# Patient Record
Sex: FEMALE | Race: WHITE | NOT HISPANIC OR LATINO | Employment: OTHER | URBAN - METROPOLITAN AREA
[De-identification: names, ages, dates, MRNs, and addresses within clinical notes are randomized per-mention and may not be internally consistent; named-entity substitution may affect disease eponyms.]

---

## 2017-06-22 ENCOUNTER — OFFICE VISIT (OUTPATIENT)
Dept: MEDICAL GROUP | Facility: PHYSICIAN GROUP | Age: 67
End: 2017-06-22
Payer: MEDICARE

## 2017-06-22 VITALS
WEIGHT: 172 LBS | DIASTOLIC BLOOD PRESSURE: 80 MMHG | BODY MASS INDEX: 28.66 KG/M2 | RESPIRATION RATE: 12 BRPM | OXYGEN SATURATION: 95 % | TEMPERATURE: 98.4 F | SYSTOLIC BLOOD PRESSURE: 120 MMHG | HEIGHT: 65 IN | HEART RATE: 82 BPM

## 2017-06-22 DIAGNOSIS — R10.9 RIGHT SIDED ABDOMINAL PAIN: ICD-10-CM

## 2017-06-22 DIAGNOSIS — E78.1 HYPERTRIGLYCERIDEMIA: ICD-10-CM

## 2017-06-22 DIAGNOSIS — M25.561 CHRONIC PAIN OF RIGHT KNEE: ICD-10-CM

## 2017-06-22 DIAGNOSIS — M79.672 PAIN OF LEFT HEEL: ICD-10-CM

## 2017-06-22 DIAGNOSIS — Z12.31 VISIT FOR SCREENING MAMMOGRAM: ICD-10-CM

## 2017-06-22 DIAGNOSIS — E55.9 VITAMIN D INSUFFICIENCY: ICD-10-CM

## 2017-06-22 DIAGNOSIS — G89.29 CHRONIC PAIN OF RIGHT KNEE: ICD-10-CM

## 2017-06-22 DIAGNOSIS — B35.1 OM (ONYCHOMYCOSIS): ICD-10-CM

## 2017-06-22 DIAGNOSIS — R22.1 NECK SWELLING: ICD-10-CM

## 2017-06-22 DIAGNOSIS — Z00.00 ROUTINE HEALTH MAINTENANCE: ICD-10-CM

## 2017-06-22 DIAGNOSIS — L57.0 AK (ACTINIC KERATOSIS): ICD-10-CM

## 2017-06-22 DIAGNOSIS — Z23 NEED FOR VACCINATION: ICD-10-CM

## 2017-06-22 LAB
APPEARANCE UR: CLEAR
BILIRUB UR STRIP-MCNC: NORMAL MG/DL
COLOR UR AUTO: NORMAL
GLUCOSE UR STRIP.AUTO-MCNC: NORMAL MG/DL
KETONES UR STRIP.AUTO-MCNC: NORMAL MG/DL
LEUKOCYTE ESTERASE UR QL STRIP.AUTO: NORMAL
NITRITE UR QL STRIP.AUTO: NORMAL
PH UR STRIP.AUTO: 5 [PH] (ref 5–8)
PROT UR QL STRIP: NORMAL MG/DL
RBC UR QL AUTO: NORMAL
SP GR UR STRIP.AUTO: 1.03
UROBILINOGEN UR STRIP-MCNC: 0.2 MG/DL

## 2017-06-22 PROCEDURE — 81002 URINALYSIS NONAUTO W/O SCOPE: CPT | Performed by: NURSE PRACTITIONER

## 2017-06-22 PROCEDURE — 90670 PCV13 VACCINE IM: CPT | Performed by: NURSE PRACTITIONER

## 2017-06-22 PROCEDURE — G0009 ADMIN PNEUMOCOCCAL VACCINE: HCPCS | Performed by: NURSE PRACTITIONER

## 2017-06-22 PROCEDURE — 99214 OFFICE O/P EST MOD 30 MIN: CPT | Mod: 25 | Performed by: NURSE PRACTITIONER

## 2017-06-22 RX ORDER — OMEPRAZOLE 20 MG/1
20 CAPSULE, DELAYED RELEASE ORAL DAILY
COMMUNITY
End: 2018-12-27 | Stop reason: CLARIF

## 2017-06-22 RX ORDER — TERBINAFINE HYDROCHLORIDE 250 MG/1
250 TABLET ORAL DAILY
Qty: 45 TAB | Refills: 1 | Status: SHIPPED | OUTPATIENT
Start: 2017-06-22 | End: 2018-04-06 | Stop reason: SDUPTHER

## 2017-06-22 RX ORDER — MELOXICAM 15 MG/1
15 TABLET ORAL
Qty: 30 TAB | Refills: 1 | Status: SHIPPED | OUTPATIENT
Start: 2017-06-22 | End: 2017-12-22

## 2017-06-22 RX ORDER — MELOXICAM 15 MG/1
15 TABLET ORAL DAILY
COMMUNITY
End: 2017-06-22 | Stop reason: SDUPTHER

## 2017-06-22 NOTE — Clinical Note
June 22, 2017        Dear Shannan:      I believe you have plantar fascitis:      -frozen water bottle, roll under foot 3 times daily for 10 minutes       -night splint to keep to keep foot flexed      -meloxicam 15 mg daily x3 weeks       -x-ray if symptoms not improving in 3 weeks     For your toenail fungus:       -terbinafine once daily for 45 days       -in 6 weeks do labs, can complete next 45 days if labs okay       -soak feet in epson salt     The meloxicam will hopefully help your right sided pain. If not, please notify me for x-ray.     If you have any questions or concerns, please don't hesitate to call.        Sincerely,        MAYCOL Catalan.    Electronically Signed

## 2017-06-22 NOTE — ASSESSMENT & PLAN NOTE
Long-standing problem, not on any medication for tx.    Ref. Range 10/20/2015 06:16 6/24/2016 06:24   Cholesterol,Tot Latest Ref Range: 100-199 mg/dL 188 183   Triglycerides Latest Ref Range: 0-149 mg/dL 203 (H) 214 (H)   HDL Latest Ref Range: >=40 mg/dL 45 45   LDL Latest Ref Range: <100 mg/dL 102 (H) 95

## 2017-06-22 NOTE — ASSESSMENT & PLAN NOTE
Ongoing problem currently only on vitamin D 3,000 units supplementation.    Ref. Range 9/13/2014 09:08 6/24/2016 06:24   25-Hydroxy   Vitamin D 25 Latest Ref Range:  ng/mL 24 (L) 23 (L)

## 2017-06-22 NOTE — MR AVS SNAPSHOT
"        Shannan Dove   2017 3:00 PM   Office Visit   MRN: [id number]    Department:  [facility]   Dept Phone:  [phone]    Description:  Female : 1950   Provider:  ELLEN Catalan           Reason for Visit     Annual Exam           Allergies as of 2017     Allergen Noted Reactions    Pcn [Penicillins] 2011   Hives      You were diagnosed with     Well female exam with routine gynecological exam   [856115]       Pain of left heel   [239393]       Need for vaccination   [785061]       Vitamin D insufficiency   [487122]       Hypertriglyceridemia   [109921]       Chronic pain of right knee   [8170818]       Right sided abdominal pain   [534055]       AK (actinic keratosis)   [633894]       OM (onychomycosis)   [831257]       Neck swelling   [386544]         Vital Signs     Blood Pressure Pulse Temperature Respirations Height Weight    120/80 mmHg 82 36.9 °C (98.4 °F) 12 1.651 m (5' 5\") 78.019 kg (172 lb)    Body Mass Index Oxygen Saturation Smoking Status             28.62 kg/m2 95% Never Smoker          Basic Information     Date Of Birth Sex Race Ethnicity Preferred Language    1950 Female Unknown Non- English      Problem List              ICD-10-CM Priority Class Noted - Resolved    Heart murmur R01.1   Unknown - Present    Post menopausal problems N95.9   Unknown - Present    AK (actinic keratosis) L57.0   2016 - Present    Chronic pain of right knee M25.561, G89.29   2016 - Present    Hypertriglyceridemia E78.1   2016 - Present    Right-sided thoracic back pain M54.6   2016 - Present    Pain of left heel M79.672   2017 - Present    Vitamin D insufficiency E55.9   2017 - Present    Right sided abdominal pain R10.9   2017 - Present    OM (onychomycosis) B35.1   2017 - Present      Health Maintenance        Date Due Completion Dates    IMM DTaP/Tdap/Td Vaccine (1 - Tdap) 12/3/1970 ---    IMM PNEUMOCOCCAL 65+ (ADULT) " LOW/MEDIUM RISK SERIES (1 of 2 - PCV13) 12/3/2016 ---    MAMMOGRAM 7/7/2017 7/7/2016, 9/28/2011, 6/17/2009, 6/17/2009, 6/16/2008, 6/16/2008    BONE DENSITY 7/7/2021 7/7/2016, 9/28/2011, 6/17/2009, 6/16/2008    COLONOSCOPY 8/3/2021 8/3/2016            Current Immunizations     13-VALENT PCV PREVNAR  Incomplete    Influenza TIV (IM) 10/21/2011    Influenza Vaccine Quad Inj (Pf) 9/13/2014    SHINGLES VACCINE 10/21/2011    Tdap Vaccine 8/11/2010      Below and/or attached are the medications your provider expects you to take. Review all of your home medications and newly ordered medications with your provider and/or pharmacist. Follow medication instructions as directed by your provider and/or pharmacist. Please keep your medication list with you and share with your provider. Update the information when medications are discontinued, doses are changed, or new medications (including over-the-counter products) are added; and carry medication information at all times in the event of emergency situations     Allergies:  PCN - Hives               Medications  Valid as of: June 22, 2017 -  4:40 PM    Generic Name Brand Name Tablet Size Instructions for use    Cholecalciferol (Tab) Vitamin D3 2000 UNITS Take  by mouth.        Meloxicam (Tab) MOBIC 15 MG Take 1 Tab by mouth 1 time daily as needed for Mild Pain.        Omeprazole (CAPSULE DELAYED RELEASE) PRILOSEC 20 MG Take 20 mg by mouth every day.        Terbinafine HCl (Tab) LAMISIL 250 MG Take 1 Tab by mouth every day.        .                 Medicines prescribed today were sent to:     [facility] #[id number] - [place], [place] - [address]    [address] [facility] [place] [address]    Phone: [phone] Fax: [phone]    Open 24 Hours?: No      Medication refill instructions:       If your prescription bottle indicates you have medication refills left, it is not necessary to call your provider’s office. Please contact your pharmacy and they will refill your medication.    If your  prescription bottle indicates you do not have any refills left, you may request refills at any time through one of the following ways: The online [email or web address] system (except Urgent Care), by calling your provider’s office, or by asking your pharmacy to contact your provider’s office with a refill request. Medication refills are processed only during regular business hours and may not be available until the next business day. Your provider may request additional information or to have a follow-up visit with you prior to refilling your medication.   *Please Note: Medication refills are assigned a new Rx number when refilled electronically. Your pharmacy may indicate that no refills were authorized even though a new prescription for the same medication is available at the pharmacy. Please request the medicine by name with the pharmacy before contacting your provider for a refill.        Your To Do List     Future Labs/Procedures Complete By Expires    CBC WITH DIFFERENTIAL  As directed 6/23/2018    COMP METABOLIC PANEL  As directed 6/23/2018    DX-FOOT-COMPLETE 3+ LEFT  As directed 12/23/2017    LIPID PROFILE  As directed 6/23/2018    TSH  As directed 6/23/2018    US-SOFT TISSUES OF HEAD - NECK  As directed 12/23/2017    VITAMIN D,25 HYDROXY  As directed 6/23/2018      Referral     A referral request has been sent to our patient care coordination department. Please allow 3-5 business days for us to process this request and contact you either by phone or mail. If you do not hear from us by the 5th business day, please call us at [phone].           [email or web address] Access Code: Activation code not generated  Current [email or web address] Status: Active

## 2017-06-22 NOTE — ASSESSMENT & PLAN NOTE
Ongoing problem for >1 year. States she has pain just below right breast between ribs. It hurts when she leans forward or when she presses on it.

## 2017-06-22 NOTE — ASSESSMENT & PLAN NOTE
Ongoing problem. She has persistent spots on face that dermatologist said do not need to be frozen off. She also has spots on her back she would like dermatologist to look at.

## 2017-06-22 NOTE — ASSESSMENT & PLAN NOTE
She has persistent toenail fungus of both great toes and in the past was on oral anti-fungal but did not complete course.

## 2017-06-23 ENCOUNTER — HOSPITAL ENCOUNTER (OUTPATIENT)
Dept: RADIOLOGY | Facility: MEDICAL CENTER | Age: 67
End: 2017-06-23
Attending: NURSE PRACTITIONER
Payer: MEDICARE

## 2017-06-23 DIAGNOSIS — R22.1 NECK SWELLING: ICD-10-CM

## 2017-06-23 PROBLEM — E07.9 THYROID MASS: Status: ACTIVE | Noted: 2017-06-23

## 2017-06-23 PROCEDURE — 76536 US EXAM OF HEAD AND NECK: CPT

## 2017-06-28 NOTE — PROGRESS NOTES
Subjective:     Chief Complaint   Patient presents with   • Annual Exam       HPI  Shannan Dove is a 66 y.o. female here today for annual exam, however, she has multiple issues she would like to address today, therefore I have advised patient that this will likely be charged for a regular visit rather than annual exam.    Pain of left heel  New problem. Started a few months ago. The pain is in left heel, described as aching. Hurts the most when she wakes up in the morning. She will do calf stretches which helps a little bit.     Vitamin D insufficiency  Ongoing problem currently only on vitamin D 3,000 units supplementation.    Ref. Range 9/13/2014 09:08 6/24/2016 06:24   25-Hydroxy   Vitamin D 25 Latest Ref Range:  ng/mL 24 (L) 23 (L)       Hypertriglyceridemia  Long-standing problem, not on any medication for tx.    Ref. Range 10/20/2015 06:16 6/24/2016 06:24   Cholesterol,Tot Latest Ref Range: 100-199 mg/dL 188 183   Triglycerides Latest Ref Range: 0-149 mg/dL 203 (H) 214 (H)   HDL Latest Ref Range: >=40 mg/dL 45 45   LDL Latest Ref Range: <100 mg/dL 102 (H) 95       Chronic pain of right knee  Established problem since June 2016 after she had right knee strain while gardening. Meloxicam works well for her when pain more severe. No erythema, edema, or warmth of joint. No instability.     Right sided abdominal pain  Ongoing problem for >1 year. States she has pain just below right breast between ribs. It hurts when she leans forward or when she presses on it.     AK (actinic keratosis)  Ongoing problem. She has persistent spots on face that dermatologist said do not need to be frozen off. She also has spots on her back she would like dermatologist to look at.     OM (onychomycosis)  She has persistent toenail fungus of both great toes and in the past was on oral anti-fungal but did not complete course.        Diagnoses of Pain of left heel, Vitamin D insufficiency, Hypertriglyceridemia, Chronic pain of  "right knee, Right sided abdominal pain, AK (actinic keratosis), OM (onychomycosis), Neck swelling, Need for vaccination, Visit for screening mammogram, and Routine health maintenance were pertinent to this visit.    Allergies: Pcn  Current medicines (including changes today)  Current Outpatient Prescriptions   Medication Sig Dispense Refill   • omeprazole (PRILOSEC) 20 MG delayed-release capsule Take 20 mg by mouth every day.     • meloxicam (MOBIC) 15 MG tablet Take 1 Tab by mouth 1 time daily as needed for Mild Pain. 30 Tab 1   • terbinafine (LAMISIL) 250 MG Tab Take 1 Tab by mouth every day. 45 Tab 1   • Cholecalciferol (VITAMIN D3) 2000 UNITS Tab Take  by mouth.       No current facility-administered medications for this visit.       She  has a past medical history of Heart murmur; Post menopausal problems; Vitamin D deficiency; Hyperlipidemia; Pap smear; and Gyn exam. She also has no past medical history of ASTHMA or Diabetes.    Health Maintenance: mammogram due July ROS  As stated in HPI and additionally  General: No F/C or malaise  CV: No chest pain, NOVAK, or LE edema  Pulm: No sob or dyspnea  GI: No abd pain, N/V, diarrhea, constipation, blood in stool  Endo: No temperature intolerance, skin or hair changes      Objective:     Blood pressure 120/80, pulse 82, temperature 36.9 °C (98.4 °F), resp. rate 12, height 1.651 m (5' 5\"), weight 78.019 kg (172 lb), SpO2 95 %. Body mass index is 28.62 kg/(m^2).  Physical Exam:  General: Alert, oriented, in no acute distress.  Eye contact is good, speech goal directed, affect calm  HEENT: conjunctiva non-injected, sclera non-icteric, EOMs intact.   Pinna normal without lesions. TMs pearly gray bilat. Gross hearing intact.  Nasal turbinates without edema or drainage.   Oral mucous membranes pink and moist with no lesions. Oropharynx without erythema, or exudate.   Neck: No adenopathy or masses in the cervical or supraclavicular regions. No thyromegaly, but palpable " non-tender mass just below right thyroid lobe  Lungs: unlabored. clear to auscultation bilaterally with good excursion.  CV: regular rate and rhythm. No murmurs. No carotid bruits.   Abdomen: Soft, ND, non-tender. No hepatosplenomegaly. Bowel sounds active.  MSK: +TTP in intercostal muscles right low rib region  Left foot without deformity, erythema, edema, or ecchymosis. TTP over insertion of plantar fascia into calcaneous    Ext: no edema, normal color and temperature.   Skin: flat white lesions of face, 2 small flat erythematous lesions of right chin. Toes with thickened, yellow nails   Gait steady.     Assessment and Plan:   Assessment/Plan:  1. Plantar fascitis, left   Enc night splint, frozen water bottle to ice under food, supportive shoes, and meloxicam. Obtain x-ray in 3 weeks if not improving.  - DX-FOOT-COMPLETE 3+ LEFT; Future    2. Vitamin D insufficiency  Not controlled based on last labs, order now for f/u monitoring   - VITAMIN D,25 HYDROXY; Future    3. Hypertriglyceridemia  Stable without tx, but due for monitoring of labs for annual check   - LIPID PROFILE; Future    4. Chronic pain of right knee  Chronic problem, requesting meloxicam for PRN use. Educated on cardiac risks  - meloxicam (MOBIC) 15 MG tablet; Take 1 Tab by mouth 1 time daily as needed for Mild Pain.  Dispense: 30 Tab; Refill: 1    5. Right sided abdominal pain  Appears to be pain of intercostal muscle region. Meloxicam for knee will likely help this pain.   - POCT Urinalysis    6. AK (actinic keratosis)  - REFERRAL TO DERMATOLOGY    7. OM (onychomycosis)  Start terbinafine, obtain labs in 6 weeks to monitor LFT, then may continue another 6 weeks to a max 12 weeks combined  - terbinafine (LAMISIL) 250 MG Tab; Take 1 Tab by mouth every day.  Dispense: 45 Tab; Refill: 1    8. Neck swelling  - TSH; Future  - US-SOFT TISSUES OF HEAD - NECK; Future    9. Need for vaccination  I have placed the below orders and discussed them with an  approved delegating provider. The MA is performing the below orders under the direction of Dr. Ochoa   - PNEUMOCOCCAL CONJUGATE VACCINE 13-VALENT    10. Routine health maintenance  - CBC WITH DIFFERENTIAL; Future  - COMP METABOLIC PANEL; Future  - TSH; Future       Follow up:  Return in about 6 months (around 12/22/2017).    Educated in proper administration of medication(s) ordered today including safety, possible SE, risks, benefits, rationale and alternatives to therapy.   Supportive care, differential diagnoses, and indications for immediate follow-up discussed with patient.    Pathogenesis of diagnosis discussed including typical length and natural progression.    Instructed to return to clinic or nearest emergency department for any change in condition, further concerns, or worsening of symptoms.  Patient states understanding of the plan of care and discharge instructions.      Please note that this dictation was created using voice recognition software. I have made every reasonable attempt to correct obvious errors, but I expect that there are errors of grammar and possibly content that I did not discover before finalizing the note.    Followup: Return in about 6 months (around 12/22/2017). sooner should new symptoms or problems arise.

## 2017-07-11 ENCOUNTER — HOSPITAL ENCOUNTER (OUTPATIENT)
Dept: RADIOLOGY | Facility: MEDICAL CENTER | Age: 67
End: 2017-07-11
Attending: NURSE PRACTITIONER
Payer: MEDICARE

## 2017-07-11 DIAGNOSIS — Z12.31 ENCOUNTER FOR SCREENING MAMMOGRAM FOR BREAST CANCER: ICD-10-CM

## 2017-07-11 PROCEDURE — 77063 BREAST TOMOSYNTHESIS BI: CPT

## 2017-07-13 ENCOUNTER — PATIENT MESSAGE (OUTPATIENT)
Dept: MEDICAL GROUP | Facility: PHYSICIAN GROUP | Age: 67
End: 2017-07-13

## 2017-07-13 DIAGNOSIS — R92.30 DENSE BREAST TISSUE ON MAMMOGRAM: ICD-10-CM

## 2017-08-04 ENCOUNTER — HOSPITAL ENCOUNTER (OUTPATIENT)
Dept: LAB | Facility: MEDICAL CENTER | Age: 67
End: 2017-08-04
Attending: NURSE PRACTITIONER
Payer: MEDICARE

## 2017-08-04 DIAGNOSIS — R22.1 NECK SWELLING: ICD-10-CM

## 2017-08-04 DIAGNOSIS — E55.9 VITAMIN D INSUFFICIENCY: ICD-10-CM

## 2017-08-04 DIAGNOSIS — Z00.00 ROUTINE HEALTH MAINTENANCE: ICD-10-CM

## 2017-08-04 DIAGNOSIS — E78.1 HYPERTRIGLYCERIDEMIA: ICD-10-CM

## 2017-08-04 LAB
25(OH)D3 SERPL-MCNC: 25 NG/ML (ref 30–100)
ALBUMIN SERPL BCP-MCNC: 4.4 G/DL (ref 3.2–4.9)
ALBUMIN/GLOB SERPL: 1.6 G/DL
ALP SERPL-CCNC: 48 U/L (ref 30–99)
ALT SERPL-CCNC: 28 U/L (ref 2–50)
ANION GAP SERPL CALC-SCNC: 7 MMOL/L (ref 0–11.9)
AST SERPL-CCNC: 17 U/L (ref 12–45)
BASOPHILS # BLD AUTO: 0.4 % (ref 0–1.8)
BASOPHILS # BLD: 0.02 K/UL (ref 0–0.12)
BILIRUB SERPL-MCNC: 0.6 MG/DL (ref 0.1–1.5)
BUN SERPL-MCNC: 16 MG/DL (ref 8–22)
CALCIUM SERPL-MCNC: 9.6 MG/DL (ref 8.5–10.5)
CHLORIDE SERPL-SCNC: 105 MMOL/L (ref 96–112)
CHOLEST SERPL-MCNC: 242 MG/DL (ref 100–199)
CO2 SERPL-SCNC: 27 MMOL/L (ref 20–33)
CREAT SERPL-MCNC: 0.69 MG/DL (ref 0.5–1.4)
EOSINOPHIL # BLD AUTO: 0.17 K/UL (ref 0–0.51)
EOSINOPHIL NFR BLD: 3.3 % (ref 0–6.9)
ERYTHROCYTE [DISTWIDTH] IN BLOOD BY AUTOMATED COUNT: 42 FL (ref 35.9–50)
GFR SERPL CREATININE-BSD FRML MDRD: >60 ML/MIN/1.73 M 2
GLOBULIN SER CALC-MCNC: 2.8 G/DL (ref 1.9–3.5)
GLUCOSE SERPL-MCNC: 104 MG/DL (ref 65–99)
HCT VFR BLD AUTO: 41.3 % (ref 37–47)
HDLC SERPL-MCNC: 46 MG/DL
HGB BLD-MCNC: 13.8 G/DL (ref 12–16)
IMM GRANULOCYTES # BLD AUTO: 0.01 K/UL (ref 0–0.11)
IMM GRANULOCYTES NFR BLD AUTO: 0.2 % (ref 0–0.9)
LDLC SERPL CALC-MCNC: 148 MG/DL
LYMPHOCYTES # BLD AUTO: 2.21 K/UL (ref 1–4.8)
LYMPHOCYTES NFR BLD: 43.4 % (ref 22–41)
MCH RBC QN AUTO: 30.3 PG (ref 27–33)
MCHC RBC AUTO-ENTMCNC: 33.4 G/DL (ref 33.6–35)
MCV RBC AUTO: 90.8 FL (ref 81.4–97.8)
MONOCYTES # BLD AUTO: 0.45 K/UL (ref 0–0.85)
MONOCYTES NFR BLD AUTO: 8.8 % (ref 0–13.4)
NEUTROPHILS # BLD AUTO: 2.23 K/UL (ref 2–7.15)
NEUTROPHILS NFR BLD: 43.9 % (ref 44–72)
NRBC # BLD AUTO: 0 K/UL
NRBC BLD AUTO-RTO: 0 /100 WBC
PLATELET # BLD AUTO: 272 K/UL (ref 164–446)
PMV BLD AUTO: 10.3 FL (ref 9–12.9)
POTASSIUM SERPL-SCNC: 3.9 MMOL/L (ref 3.6–5.5)
PROT SERPL-MCNC: 7.2 G/DL (ref 6–8.2)
RBC # BLD AUTO: 4.55 M/UL (ref 4.2–5.4)
SODIUM SERPL-SCNC: 139 MMOL/L (ref 135–145)
TRIGL SERPL-MCNC: 238 MG/DL (ref 0–149)
TSH SERPL DL<=0.005 MIU/L-ACNC: 2.29 UIU/ML (ref 0.3–3.7)
WBC # BLD AUTO: 5.1 K/UL (ref 4.8–10.8)

## 2017-08-04 PROCEDURE — 80061 LIPID PANEL: CPT

## 2017-08-04 PROCEDURE — 85025 COMPLETE CBC W/AUTO DIFF WBC: CPT

## 2017-08-04 PROCEDURE — 80053 COMPREHEN METABOLIC PANEL: CPT

## 2017-08-04 PROCEDURE — 36415 COLL VENOUS BLD VENIPUNCTURE: CPT

## 2017-08-04 PROCEDURE — 82306 VITAMIN D 25 HYDROXY: CPT

## 2017-08-04 PROCEDURE — 84443 ASSAY THYROID STIM HORMONE: CPT

## 2017-08-21 PROBLEM — D49.2 NEOPLASM OF UNSPECIFIED BEHAVIOR OF BONE, SOFT TISSUE, AND SKIN: Status: RESOLVED | Noted: 2017-08-07 | Resolved: 2017-08-21

## 2017-09-07 ENCOUNTER — TELEPHONE (OUTPATIENT)
Dept: RADIOLOGY | Facility: MEDICAL CENTER | Age: 67
End: 2017-09-07

## 2017-09-07 NOTE — TELEPHONE ENCOUNTER
Spoke w/ pt to yahir apt @ [facility] on 9/8 @ 8:00 check in @ 7:45, reviewed prep and location

## 2017-09-08 ENCOUNTER — HOSPITAL ENCOUNTER (OUTPATIENT)
Dept: RADIOLOGY | Facility: MEDICAL CENTER | Age: 67
End: 2017-09-08
Attending: NURSE PRACTITIONER
Payer: MEDICARE

## 2017-09-08 DIAGNOSIS — R92.30 DENSE BREAST TISSUE ON MAMMOGRAM: ICD-10-CM

## 2017-10-26 ENCOUNTER — TELEPHONE (OUTPATIENT)
Dept: RADIOLOGY | Facility: MEDICAL CENTER | Age: 67
End: 2017-10-26

## 2017-10-26 NOTE — TELEPHONE ENCOUNTER
Spoke w/ pt to conf apt @ [facility] on 10/30 @ 7:30 check in @ 7:15, reviewed prep and location

## 2017-10-30 ENCOUNTER — HOSPITAL ENCOUNTER (OUTPATIENT)
Dept: RADIOLOGY | Facility: MEDICAL CENTER | Age: 67
End: 2017-10-30
Attending: NURSE PRACTITIONER
Payer: MEDICARE

## 2017-10-30 PROCEDURE — 76641 ULTRASOUND BREAST COMPLETE: CPT

## 2017-11-17 ENCOUNTER — PATIENT OUTREACH (OUTPATIENT)
Dept: HEALTH INFORMATION MANAGEMENT | Facility: OTHER | Age: 67
End: 2017-11-17

## 2017-11-17 NOTE — PROGRESS NOTES
Attempt #:1    WebIZ Checked & Epic Updated: Epic matches WebIZ  · WebIZ Recommendations: Patient is up to date on all vaccines  · Is patient due for Tdap? NO  · Is patient due for Shingles? NO  HealthConnect Verified: yes  Verify PCP: yes    Communication Preference Obtained: yes     Review Care Team: yes    Annual Wellness Visit Scheduling  1. Scheduling Status:Scheduled     Care Gap Scheduling (Attempt to Schedule EACH Overdue Care Gap!)     Health Maintenance Due   Topic Date Due   • Annual Wellness Visit  1950   • IMM INFLUENZA (1) 09/01/2017        Scheduled patient for Annual Wellness Visit       Med[employer] Activation: already active  Medminder Melisa: no  Virtual Visits: no  Opt In to Text Messages: no

## 2017-12-22 ENCOUNTER — HOSPITAL ENCOUNTER (OUTPATIENT)
Dept: LAB | Facility: MEDICAL CENTER | Age: 67
End: 2017-12-22
Attending: FAMILY MEDICINE
Payer: MEDICARE

## 2017-12-22 ENCOUNTER — OFFICE VISIT (OUTPATIENT)
Dept: MEDICAL GROUP | Facility: PHYSICIAN GROUP | Age: 67
End: 2017-12-22
Payer: MEDICARE

## 2017-12-22 VITALS
OXYGEN SATURATION: 94 % | HEART RATE: 91 BPM | BODY MASS INDEX: 29.16 KG/M2 | DIASTOLIC BLOOD PRESSURE: 84 MMHG | TEMPERATURE: 98.4 F | HEIGHT: 65 IN | SYSTOLIC BLOOD PRESSURE: 136 MMHG | WEIGHT: 175 LBS

## 2017-12-22 DIAGNOSIS — K21.9 GASTROESOPHAGEAL REFLUX DISEASE WITHOUT ESOPHAGITIS: ICD-10-CM

## 2017-12-22 DIAGNOSIS — B35.1 OM (ONYCHOMYCOSIS): ICD-10-CM

## 2017-12-22 DIAGNOSIS — E07.9 THYROID MASS: ICD-10-CM

## 2017-12-22 LAB
T3 SERPL-MCNC: 99.3 NG/DL (ref 60–181)
T4 FREE SERPL-MCNC: 0.84 NG/DL (ref 0.53–1.43)
THYROPEROXIDASE AB SERPL-ACNC: 15.1 IU/ML (ref 0–9)
TSH SERPL DL<=0.005 MIU/L-ACNC: 1.61 UIU/ML (ref 0.38–5.33)

## 2017-12-22 PROCEDURE — 84443 ASSAY THYROID STIM HORMONE: CPT

## 2017-12-22 PROCEDURE — 84480 ASSAY TRIIODOTHYRONINE (T3): CPT

## 2017-12-22 PROCEDURE — 99214 OFFICE O/P EST MOD 30 MIN: CPT | Performed by: FAMILY MEDICINE

## 2017-12-22 PROCEDURE — 84439 ASSAY OF FREE THYROXINE: CPT

## 2017-12-22 PROCEDURE — 86376 MICROSOMAL ANTIBODY EACH: CPT

## 2017-12-22 PROCEDURE — 36415 COLL VENOUS BLD VENIPUNCTURE: CPT

## 2017-12-22 ASSESSMENT — PATIENT HEALTH QUESTIONNAIRE - PHQ9: CLINICAL INTERPRETATION OF PHQ2 SCORE: 0

## 2017-12-22 NOTE — PROGRESS NOTES
Subjective:   Shannan Dove is a 67 y.o. female here today for Thyroid mass, toenail changes, reflux    Thyroid mass  Ongoing issue. Patient reports that she was evaluated 6 months ago for an enlarged thyroid nodule. At that time she was told that it was benign and she should follow-up in 6 months. She states that since that time she is noticing mild enlargement in that nodule. She denies any new symptoms such as temperature intolerance, skin changes, hair changes    OM (onychomycosis)  Ongoing issue. Patient reports that she was put back on terbinafine 250 mg daily. She reports that she is not been consistent with taking her medication. As a result she has had a flareup of toenail fungus on both of her feet.    Gastroesophageal reflux disease without esophagitis  Ongoing issue. Patient reports symptoms of reflux that occur primarily first thing in the morning. She states that she has a little bit of nausea but no vomiting. She does report some burning sensation in the back of her throat. She currently takes over-the-counter omeprazole. She is question whether or not she should change over to ranitidine         Current medicines (including changes today)  Current Outpatient Prescriptions   Medication Sig Dispense Refill   • Calcium Carbonate-Vit D-Min (CALCIUM 1200 PO) Take  by mouth.     • omeprazole (PRILOSEC) 20 MG delayed-release capsule Take 20 mg by mouth every day.     • terbinafine (LAMISIL) 250 MG Tab Take 1 Tab by mouth every day. 45 Tab 1   • Cholecalciferol (VITAMIN D3) 2000 UNITS Tab Take  by mouth.       No current facility-administered medications for this visit.      She  has a past medical history of Gyn exam; Heart murmur; Hyperlipidemia; Pap smear; Post menopausal problems; and Vitamin D deficiency. She also has no past medical history of ASTHMA or Diabetes.    ROS   No chest pain, no shortness of breath, no abdominal pain       Objective:     Blood pressure 136/84, pulse 91, temperature 36.9  "°C (98.4 °F), height 1.651 m (5' 5\"), weight 79.4 kg (175 lb), SpO2 94 %. Body mass index is 29.12 kg/m².   Physical Exam:  Alert, oriented in no acute distress.  Eye contact is good, speech goal directed, affect calm  HEENT: conjunctiva non-injected, sclera non-icteric.  Pinna normal. Oral mucous membranes pink and moist with no lesions.  Neck No adenopathy or masses in the neck or supraclavicular regions.  Lungs: clear to auscultation bilaterally with good excursion.  CV: regular rate and rhythm.  Abdomen: soft, nontender, No CVAT  Ext: no edema, color normal, vascularity normal, temperature normal        Assessment and Plan:   The following treatment plan was discussed     1. Thyroid mass  US-SOFT TISSUES OF HEAD - NECK    TSH    FREE THYROXINE    TRIIDOTHYRONINE    THYROID PEROXIDASE  (TPO) AB    Stable. Sent for labs along with ultrasound and monitor for results   2. Gastroesophageal reflux disease without esophagitis      Stable. Patient will stop omeprazole and start over-the-counter ranitidine; monitor for tolerance and effect   3. OM (onychomycosis)  COMP METABOLIC PANEL    Uncontrolled. Patient will finish her current prescription of terbinafine and have blood work done to test for liver function test       Followup: Return in about 6 months (around 6/22/2018) for HRA.            "

## 2017-12-22 NOTE — ASSESSMENT & PLAN NOTE
Ongoing issue. Patient reports that she was put back on terbinafine 250 mg daily. She reports that she is not been consistent with taking her medication. As a result she has had a flareup of toenail fungus on both of her feet.

## 2017-12-22 NOTE — ASSESSMENT & PLAN NOTE
Ongoing issue. Patient reports symptoms of reflux that occur primarily first thing in the morning. She states that she has a little bit of nausea but no vomiting. She does report some burning sensation in the back of her throat. She currently takes over-the-counter omeprazole. She is question whether or not she should change over to ranitidine

## 2017-12-22 NOTE — ASSESSMENT & PLAN NOTE
Ongoing issue. Patient reports that she was evaluated 6 months ago for an enlarged thyroid nodule. At that time she was told that it was benign and she should follow-up in 6 months. She states that since that time she is noticing mild enlargement in that nodule. She denies any new symptoms such as temperature intolerance, skin changes, hair changes

## 2017-12-26 ENCOUNTER — HOSPITAL ENCOUNTER (OUTPATIENT)
Dept: RADIOLOGY | Facility: MEDICAL CENTER | Age: 67
End: 2017-12-26
Attending: FAMILY MEDICINE
Payer: MEDICARE

## 2017-12-26 DIAGNOSIS — E07.9 THYROID MASS: ICD-10-CM

## 2017-12-26 PROCEDURE — 76536 US EXAM OF HEAD AND NECK: CPT

## 2017-12-27 ENCOUNTER — TELEPHONE (OUTPATIENT)
Dept: MEDICAL GROUP | Facility: PHYSICIAN GROUP | Age: 67
End: 2017-12-27

## 2017-12-27 NOTE — TELEPHONE ENCOUNTER
----- Message from Tootie Rivers D.O. sent at 12/27/2017 10:27 AM PST -----  Please advise pt the ultrasound of the thyroid gland shows that the old mass is the exact same size as it was previously and there are no new nodules. At this point, there is no intervention that we would pursue. If you have any questions, please let me know.    Tootie Rivers D.O.

## 2018-01-25 ENCOUNTER — HOSPITAL ENCOUNTER (OUTPATIENT)
Dept: LAB | Facility: MEDICAL CENTER | Age: 68
End: 2018-01-25
Attending: FAMILY MEDICINE
Payer: MEDICARE

## 2018-01-25 DIAGNOSIS — E07.9 THYROID MASS: ICD-10-CM

## 2018-01-25 LAB
ALBUMIN SERPL BCP-MCNC: 4.2 G/DL (ref 3.2–4.9)
ALBUMIN/GLOB SERPL: 1.5 G/DL
ALP SERPL-CCNC: 43 U/L (ref 30–99)
ALT SERPL-CCNC: 25 U/L (ref 2–50)
ANION GAP SERPL CALC-SCNC: 7 MMOL/L (ref 0–11.9)
AST SERPL-CCNC: 16 U/L (ref 12–45)
BILIRUB SERPL-MCNC: 0.4 MG/DL (ref 0.1–1.5)
BUN SERPL-MCNC: 22 MG/DL (ref 8–22)
CALCIUM SERPL-MCNC: 9.4 MG/DL (ref 8.5–10.5)
CHLORIDE SERPL-SCNC: 106 MMOL/L (ref 96–112)
CO2 SERPL-SCNC: 26 MMOL/L (ref 20–33)
CREAT SERPL-MCNC: 0.64 MG/DL (ref 0.5–1.4)
GLOBULIN SER CALC-MCNC: 2.8 G/DL (ref 1.9–3.5)
GLUCOSE SERPL-MCNC: 105 MG/DL (ref 65–99)
POTASSIUM SERPL-SCNC: 3.8 MMOL/L (ref 3.6–5.5)
PROT SERPL-MCNC: 7 G/DL (ref 6–8.2)
SODIUM SERPL-SCNC: 139 MMOL/L (ref 135–145)

## 2018-01-25 PROCEDURE — 36415 COLL VENOUS BLD VENIPUNCTURE: CPT

## 2018-01-25 PROCEDURE — 80053 COMPREHEN METABOLIC PANEL: CPT

## 2018-02-08 ENCOUNTER — APPOINTMENT (RX ONLY)
Dept: URBAN - METROPOLITAN AREA CLINIC 31 | Facility: CLINIC | Age: 68
Setting detail: DERMATOLOGY
End: 2018-02-08

## 2018-02-08 DIAGNOSIS — L57.0 ACTINIC KERATOSIS: ICD-10-CM

## 2018-02-08 DIAGNOSIS — D22 MELANOCYTIC NEVI: ICD-10-CM

## 2018-02-08 DIAGNOSIS — L82.1 OTHER SEBORRHEIC KERATOSIS: ICD-10-CM

## 2018-02-08 PROBLEM — D22.39 MELANOCYTIC NEVI OF OTHER PARTS OF FACE: Status: ACTIVE | Noted: 2018-02-08

## 2018-02-08 PROCEDURE — 17000 DESTRUCT PREMALG LESION: CPT

## 2018-02-08 PROCEDURE — ? LIQUID NITROGEN

## 2018-02-08 PROCEDURE — 99212 OFFICE O/P EST SF 10 MIN: CPT | Mod: 25

## 2018-02-08 PROCEDURE — ? COUNSELING

## 2018-02-08 PROCEDURE — 17003 DESTRUCT PREMALG LES 2-14: CPT

## 2018-02-08 ASSESSMENT — LOCATION SIMPLE DESCRIPTION DERM
LOCATION SIMPLE: LEFT CHEEK
LOCATION SIMPLE: LEFT FOREHEAD
LOCATION SIMPLE: LEFT TEMPLE
LOCATION SIMPLE: RIGHT FOREHEAD
LOCATION SIMPLE: RIGHT CHEEK
LOCATION SIMPLE: LEFT LIP

## 2018-02-08 ASSESSMENT — LOCATION DETAILED DESCRIPTION DERM
LOCATION DETAILED: LEFT MID TEMPLE
LOCATION DETAILED: RIGHT INFERIOR FOREHEAD
LOCATION DETAILED: LEFT UPPER CUTANEOUS LIP
LOCATION DETAILED: LEFT INFERIOR LATERAL FOREHEAD
LOCATION DETAILED: RIGHT CENTRAL MALAR CHEEK
LOCATION DETAILED: LEFT CENTRAL MALAR CHEEK
LOCATION DETAILED: LEFT INFERIOR CENTRAL MALAR CHEEK

## 2018-02-08 ASSESSMENT — LOCATION ZONE DERM
LOCATION ZONE: FACE
LOCATION ZONE: LIP

## 2018-02-08 NOTE — HPI: SKIN LESION (ACTINIC KERATOSES)
Is This A New Presentation, Or A Follow-Up?: Follow Up Actinic Keratoses
Additional History: She reports doing well.

## 2018-02-28 ENCOUNTER — PATIENT OUTREACH (OUTPATIENT)
Dept: HEALTH INFORMATION MANAGEMENT | Facility: OTHER | Age: 68
End: 2018-02-28

## 2018-02-28 NOTE — PROGRESS NOTES
Attempt #:FINAL      HealthConnect Verified: yes  Verify PCP: yes    Communication Preference Obtained: no     Review Care Team: no    Annual Wellness Visit Scheduling  1. Scheduling Status:Not Scheduled. Patient states they are not interested          Care Gap Scheduling (Attempt to Schedule EACH Overdue Care Gap!)  Health Maintenance Due   Topic Date Due   • Annual Wellness Visit  1950           Kaleb Activation: already active

## 2018-03-01 ENCOUNTER — PATIENT OUTREACH (OUTPATIENT)
Dept: HEALTH INFORMATION MANAGEMENT | Facility: OTHER | Age: 68
End: 2018-03-01

## 2018-03-01 NOTE — PROGRESS NOTES
1. Attempt #: Final  2. HealthConnect Verified: yes    3. Verify PCP: yes    4. Care Team Updated:       •   DME Company (gait device, O2, CPAP, etc.): NO       •   Other Specialists (eye doctor, derm, GYN, cardiology, endo, etc): YES    5.  Reviewed/Updated the following with patient:       •   Communication Preference Obtained? YES       •   Preferred Pharmacy? YES       •   Preferred Lab? YES       •   Family History (document living status of immediate family members and if + hx of cancer, diabetes, hypertension, hyperlipidemia, heart attack, stroke) YES. Was Abstract Encounter opened and chart updated? YES    6. [employer] Activation: already active    7. [employer] Melisa: yes    8. Annual Wellness Visit Scheduling  Scheduling Status:Scheduled      9. Care Gap Scheduling (Attempt to Schedule EACH Overdue Care Gap!)     Health Maintenance Due   Topic Date Due   • Annual Wellness Visit  1950        Scheduled patient for Annual Wellness Visit    10. Patient was advised: “This is a free wellness visit. The provider will screen for medical conditions to help you stay healthy. If you have other concerns to address you may be asked to discuss these at a separate visit or there may be an additional fee.”     11. Patient was informed to arrive 15 min prior to their scheduled appointment and bring in their medication bottles.

## 2018-04-05 ENCOUNTER — TELEPHONE (OUTPATIENT)
Dept: MEDICAL GROUP | Facility: PHYSICIAN GROUP | Age: 68
End: 2018-04-05

## 2018-04-05 NOTE — TELEPHONE ENCOUNTER
Future Appointments       Provider Department [place]    4/6/2018 9:20 AM Tootie Rivers D.O.; [facility]    6/8/2018 10:20 AM Tootie Rivers D.O. [facility] ANNUAL WELLNESS VISIT PRE-VISIT PLANNING WITH OUTREACH    1.  Immunizations were updated in Epic using WebIZ?:Yes       •  WebIZ Recommendations: Patient is up to date on all vaccines       •  Is patient due for Tdap? NO       •  Is patient due for Shingles?NO    2.  MDX printed for Provider? YES     NO CARE PATHS  PENDING REFERRAL TO DERMATOLOGY    Health Maintenance Due   Topic Date Due   • Annual Wellness Visit  SCHEDULED FOR 4/6/18

## 2018-04-06 ENCOUNTER — OFFICE VISIT (OUTPATIENT)
Dept: MEDICAL GROUP | Facility: PHYSICIAN GROUP | Age: 68
End: 2018-04-06
Payer: MEDICARE

## 2018-04-06 VITALS
BODY MASS INDEX: 28.99 KG/M2 | DIASTOLIC BLOOD PRESSURE: 78 MMHG | SYSTOLIC BLOOD PRESSURE: 134 MMHG | TEMPERATURE: 97.5 F | WEIGHT: 174 LBS | HEART RATE: 83 BPM | OXYGEN SATURATION: 95 % | HEIGHT: 65 IN

## 2018-04-06 DIAGNOSIS — B35.1 OM (ONYCHOMYCOSIS): ICD-10-CM

## 2018-04-06 DIAGNOSIS — E78.5 DYSLIPIDEMIA: ICD-10-CM

## 2018-04-06 DIAGNOSIS — Z00.00 MEDICARE ANNUAL WELLNESS VISIT, SUBSEQUENT: ICD-10-CM

## 2018-04-06 DIAGNOSIS — E07.9 THYROID MASS: ICD-10-CM

## 2018-04-06 DIAGNOSIS — G89.29 CHRONIC PAIN OF RIGHT KNEE: ICD-10-CM

## 2018-04-06 DIAGNOSIS — M25.561 CHRONIC PAIN OF RIGHT KNEE: ICD-10-CM

## 2018-04-06 DIAGNOSIS — K21.9 GASTROESOPHAGEAL REFLUX DISEASE WITHOUT ESOPHAGITIS: ICD-10-CM

## 2018-04-06 DIAGNOSIS — R01.1 HEART MURMUR: ICD-10-CM

## 2018-04-06 DIAGNOSIS — E55.9 VITAMIN D INSUFFICIENCY: ICD-10-CM

## 2018-04-06 DIAGNOSIS — N95.9 POST MENOPAUSAL PROBLEMS: ICD-10-CM

## 2018-04-06 DIAGNOSIS — L57.0 AK (ACTINIC KERATOSIS): ICD-10-CM

## 2018-04-06 PROCEDURE — G0438 PPPS, INITIAL VISIT: HCPCS | Performed by: FAMILY MEDICINE

## 2018-04-06 RX ORDER — TERBINAFINE HYDROCHLORIDE 250 MG/1
250 TABLET ORAL DAILY
Qty: 45 TAB | Refills: 1 | Status: SHIPPED | OUTPATIENT
Start: 2018-04-06 | End: 2018-12-05 | Stop reason: SDUPTHER

## 2018-04-06 RX ORDER — MELOXICAM 15 MG/1
15 TABLET ORAL DAILY
Qty: 90 TAB | Refills: 1 | Status: SHIPPED | OUTPATIENT
Start: 2018-04-06 | End: 2018-12-27 | Stop reason: CLARIF

## 2018-04-06 RX ORDER — TRIAMCINOLONE ACETONIDE 1 MG/G
CREAM TOPICAL
Qty: 1 TUBE | Refills: 0 | Status: SHIPPED | OUTPATIENT
Start: 2018-04-06 | End: 2018-12-27 | Stop reason: CLARIF

## 2018-04-06 ASSESSMENT — ACTIVITIES OF DAILY LIVING (ADL): BATHING_REQUIRES_ASSISTANCE: 0

## 2018-04-06 ASSESSMENT — PATIENT HEALTH QUESTIONNAIRE - PHQ9: CLINICAL INTERPRETATION OF PHQ2 SCORE: 0

## 2018-04-06 ASSESSMENT — PAIN SCALES - GENERAL: PAINLEVEL: NO PAIN

## 2018-04-06 NOTE — PROGRESS NOTES
Chief Complaint   Patient presents with   • Annual Wellness Visit         HPI:  Shannan is a 67 y.o. here for Medicare Annual Wellness Visit    Patient Active Problem List    Diagnosis Date Noted   • Gastroesophageal reflux disease without esophagitis 12/22/2017   • Thyroid mass 06/23/2017   • Pain of left heel 06/22/2017   • Vitamin D insufficiency 06/22/2017   • Right sided abdominal pain 06/22/2017   • OM (onychomycosis) 06/22/2017   • AK (actinic keratosis) 06/23/2016   • Chronic pain of right knee 06/23/2016   • Dyslipidemia 06/23/2016   • Right-sided thoracic back pain 06/23/2016   • Heart murmur    • Post menopausal problems        Current Outpatient Prescriptions   Medication Sig Dispense Refill   • terbinafine (LAMISIL) 250 MG Tab Take 1 Tab by mouth every day. 45 Tab 1   • meloxicam (MOBIC) 15 MG tablet Take 1 Tab by mouth every day. 90 Tab 1   • triamcinolone acetonide (KENALOG) 0.1 % Cream Apply to affected area twice daily for two weeks 1 Tube 0   • Calcium Carbonate-Vit D-Min (CALCIUM 1200 PO) Take  by mouth.     • omeprazole (PRILOSEC) 20 MG delayed-release capsule Take 20 mg by mouth every day.     • Cholecalciferol (VITAMIN D3) 2000 UNITS Tab Take  by mouth.       No current facility-administered medications for this visit.         Patient is taking medications as noted in medication list.  Current supplements as per medication list.     Allergies: Pcn [penicillins]    Current social contact/activities: grandkids, vacations, visits friends   Patient's perception of their health: fair    Is patient current with immunizations? Yes.    She  reports that she has never smoked. She has never used smokeless tobacco. She reports that she does not drink alcohol or use drugs.  Counseling given: Not Answered        DPA/Advanced directive: Patient has Advanced Directive on file.     ROS:    Gait: Uses no assistive device   Ostomy: no   Other tubes: no   Amputations: no   Chronic oxygen use no   Last eye exam 2018    Wears hearing aids: no   : Denies any urinary leakage during the last 6 months      Screening:    Depression Screening    Little interest or pleasure in doing things?  0 - not at all  Feeling down, depressed, or hopeless? 0 - not at all  Patient Health Questionnaire Score: 0    If depressive symptoms identified deferred to follow up visit unless specifically addressed in assessment and plan.    Interpretation of PHQ-9 Total Score   Score Severity   1-4 No Depression   5-9 Mild Depression   10-14 Moderate Depression   15-19 Moderately Severe Depression   20-27 Severe Depression    Screening for Cognitive Impairment    Three Minute Recall (apple, watch, varsha)   /3 Village, kitchen, baby  Draw clock face with all 12 numbers set to the hand to show 10 minutes past 11 o'clock  1 4 /5 time 3:40  If cognitive concerns identified, deferred for follow up unless specifically addressed in assessment and plan.    Fall Risk Assessment    Has the patient had two or more falls in the last year or any fall with injury in the last year?  No  If fall risk identified, deferred for follow up unless specifically addressed in assessment and plan.    Safety Assessment    Throw rugs on floor.  No  Handrails on all stairs.  Yes  Good lighting in all hallways.  Yes  Difficulty hearing.  No  Patient counseled about all safety risks that were identified.    Functional Assessment ADLs    Are there any barriers preventing you from cooking for yourself or meeting nutritional needs?  No.    Are there any barriers preventing you from driving safely or obtaining transportation?  No.    Are there any barriers preventing you from using a telephone or calling for help?  No.    Are there any barriers preventing you from shopping?  No.    Are there any barriers preventing you from taking care of your own finances?  No.    Are there any barriers preventing you from managing your medications?  No.    Are there any barriers preventing you from  "showering, bathing or dressing yourself?  No.    Are you currently engaging any exercise or physical activity?  Yes.  House work    Health Maintenance Summary                Annual Wellness Visit Overdue 1950     IMM PNEUMOCOCCAL 65+ (ADULT) LOW/MEDIUM RISK SERIES Next Due 6/22/2018      Done 6/22/2017 Imm Admin: Pneumococcal Conjugate Vaccine (Prevnar/PCV-13)    MAMMOGRAM Next Due 9/8/2018      Done 9/8/2017      Patient has more history with this topic...    IMM DTaP/Tdap/Td Vaccine Next Due 8/11/2020      Done 8/11/2010 Imm Admin: Tdap Vaccine    BONE DENSITY Next Due 7/7/2021      Done 7/7/2016 DS-BONE DENSITY STUDY (DEXA)     Patient has more history with this topic...    COLONOSCOPY Next Due 8/3/2021      Done 8/3/2016 REFERRAL TO GI FOR COLONOSCOPY          Patient Care Team:  Tootie Rivers D.O. as PCP - General (Family Medicine)  [facility] as Medical Home Care Manager (Dentistry)  Markie Dumont OD as Medical Home Care Manager (Optometry)    Social History   Substance Use Topics   • Smoking status: Never Smoker   • Smokeless tobacco: Never Used   • Alcohol use No     Family History   Problem Relation Age of Onset   • Heart Disease Mother      tachycardia   • Cancer Mother      colon ca - resected   • Psychiatry Father      suicide   • Cancer Brother      Colon cancer   • Hypertension Brother      She  has a past medical history of Gyn exam; Heart murmur; Hyperlipidemia; Pap smear; Post menopausal problems; and Vitamin D deficiency. She also has no past medical history of ASTHMA or Diabetes.   Past Surgical History:   Procedure Laterality Date   • TONSILLECTOMY     • TONSILLECTOMY             Exam:     Blood pressure 134/78, pulse 83, temperature 36.4 °C (97.5 °F), height 1.651 m (5' 5\"), weight 78.9 kg (174 lb), SpO2 95 %. Body mass index is 28.96 kg/m².    Hearing excellent.    Dentition good  Alert, oriented in no acute distress.  Eye contact is good, speech goal " directed, affect calm      Assessment and Plan. The following treatment and monitoring plan is recommended:    1. Medicare annual wellness visit, subsequent  Initial Wellness Visit - Includes PPPS ()    chart reviewed and updated     2. OM (onychomycosis)  terbinafine (LAMISIL) 250 MG Tab    stable - refill of Rx provided   3. Gastroesophageal reflux disease without esophagitis      stable; monitor   4. Thyroid mass      stable; monitor   5. Vitamin D insufficiency      stable - cont supplements   6. Heart murmur      stable; monitor   7. Post menopausal problems      stable; monitor   8. AK (actinic keratosis)      stable; monitor   9. Chronic pain of right knee      stable; monitor   10. Dyslipidemia      stable; monitor         Services suggested: No services needed at this time  Health Care Screening: Age-appropriate preventive services recommended by USPTF and ACIP covered by Medicare were discussed today. Services ordered if indicated and agreed upon by the patient.  Referrals offered: PT/OT/Nutrition counseling/Behavioral Health/Smoking cessation as per orders if indicated.    Discussion today about general wellness and lifestyle habits:    · Prevent falls and reduce trip hazards; Cautioned about securing or removing rugs.  · Have a working fire alarm and carbon monoxide detector;   · Engage in regular physical activity and social activities       Follow-up: Return in about 6 months (around 10/6/2018) for check up, Short.

## 2018-08-09 ENCOUNTER — APPOINTMENT (RX ONLY)
Dept: URBAN - METROPOLITAN AREA CLINIC 31 | Facility: CLINIC | Age: 68
Setting detail: DERMATOLOGY
End: 2018-08-09

## 2018-08-09 DIAGNOSIS — D22 MELANOCYTIC NEVI: ICD-10-CM

## 2018-08-09 DIAGNOSIS — L82.1 OTHER SEBORRHEIC KERATOSIS: ICD-10-CM

## 2018-08-09 DIAGNOSIS — L57.0 ACTINIC KERATOSIS: ICD-10-CM

## 2018-08-09 DIAGNOSIS — Z71.89 OTHER SPECIFIED COUNSELING: ICD-10-CM

## 2018-08-09 DIAGNOSIS — D18.0 HEMANGIOMA: ICD-10-CM

## 2018-08-09 DIAGNOSIS — L81.4 OTHER MELANIN HYPERPIGMENTATION: ICD-10-CM

## 2018-08-09 PROBLEM — D18.01 HEMANGIOMA OF SKIN AND SUBCUTANEOUS TISSUE: Status: ACTIVE | Noted: 2018-08-09

## 2018-08-09 PROBLEM — D22.5 MELANOCYTIC NEVI OF TRUNK: Status: ACTIVE | Noted: 2018-08-09

## 2018-08-09 PROCEDURE — ? COUNSELING

## 2018-08-09 PROCEDURE — 17000 DESTRUCT PREMALG LESION: CPT

## 2018-08-09 PROCEDURE — ? LIQUID NITROGEN

## 2018-08-09 PROCEDURE — 99213 OFFICE O/P EST LOW 20 MIN: CPT | Mod: 25

## 2018-08-09 ASSESSMENT — LOCATION SIMPLE DESCRIPTION DERM
LOCATION SIMPLE: LEFT UPPER BACK
LOCATION SIMPLE: ABDOMEN
LOCATION SIMPLE: NOSE

## 2018-08-09 ASSESSMENT — LOCATION DETAILED DESCRIPTION DERM
LOCATION DETAILED: EPIGASTRIC SKIN
LOCATION DETAILED: LEFT INFERIOR MEDIAL UPPER BACK
LOCATION DETAILED: NASAL DORSUM

## 2018-08-09 ASSESSMENT — LOCATION ZONE DERM
LOCATION ZONE: TRUNK
LOCATION ZONE: NOSE

## 2018-08-09 NOTE — HPI: UPPER BODY SKIN CHECK
What Is The Reason For Today's Visit?: Upper Body Skin Exam
Additional History: Pt has no concerns today.  She has not noticed any changes with her moles or any tender or bleeding spots.

## 2018-08-10 ENCOUNTER — OFFICE VISIT (OUTPATIENT)
Dept: MEDICAL GROUP | Facility: PHYSICIAN GROUP | Age: 68
End: 2018-08-10
Payer: MEDICARE

## 2018-08-10 VITALS
TEMPERATURE: 97 F | DIASTOLIC BLOOD PRESSURE: 62 MMHG | SYSTOLIC BLOOD PRESSURE: 112 MMHG | HEIGHT: 65 IN | OXYGEN SATURATION: 95 % | HEART RATE: 69 BPM | BODY MASS INDEX: 27.66 KG/M2 | WEIGHT: 166 LBS

## 2018-08-10 DIAGNOSIS — I83.90 VARICOSE VEIN OF LEG: ICD-10-CM

## 2018-08-10 DIAGNOSIS — Z23 NEED FOR VACCINATION: ICD-10-CM

## 2018-08-10 DIAGNOSIS — Z12.31 SCREENING MAMMOGRAM, ENCOUNTER FOR: ICD-10-CM

## 2018-08-10 DIAGNOSIS — E07.9 THYROID MASS: ICD-10-CM

## 2018-08-10 DIAGNOSIS — B35.1 OM (ONYCHOMYCOSIS): ICD-10-CM

## 2018-08-10 PROCEDURE — 99214 OFFICE O/P EST MOD 30 MIN: CPT | Mod: 25 | Performed by: FAMILY MEDICINE

## 2018-08-10 PROCEDURE — 90732 PPSV23 VACC 2 YRS+ SUBQ/IM: CPT | Performed by: FAMILY MEDICINE

## 2018-08-10 PROCEDURE — G0009 ADMIN PNEUMOCOCCAL VACCINE: HCPCS | Performed by: FAMILY MEDICINE

## 2018-08-10 NOTE — ASSESSMENT & PLAN NOTE
This problem is new to me.  Patient is reporting that she feels a heaviness and achiness and both of her legs particularly at night.  She denies any specific pain.  She knows that she has varicose veins but is never had these evaluated.

## 2018-08-10 NOTE — ASSESSMENT & PLAN NOTE
Ongoing issue.  Patient would like to go back on terbinafine to help with toenail fungus.  She reports that she just stopped taking the medication on July 1.  I explained to her that she needs to be off the medication for 3 months and have her liver function rechecked.  Patient is agreeable to this plan.

## 2018-08-10 NOTE — ASSESSMENT & PLAN NOTE
Ongoing issue.  Patient reports that she is concerned that the thyroid mass has grown in size since her last evaluation.  She currently denies any issues or temperature intolerance, skin changes, hair changes.

## 2018-08-10 NOTE — PROGRESS NOTES
Subjective:   Shannan Dove is a 67 y.o. female here today for toenail fungus, thyroid mass, achiness in legs    OM (onychomycosis)  Ongoing issue.  Patient would like to go back on terbinafine to help with toenail fungus.  She reports that she just stopped taking the medication on July 1.  I explained to her that she needs to be off the medication for 3 months and have her liver function rechecked.  Patient is agreeable to this plan.    Thyroid mass  Ongoing issue.  Patient reports that she is concerned that the thyroid mass has grown in size since her last evaluation.  She currently denies any issues or temperature intolerance, skin changes, hair changes.    Varicose vein of leg  This problem is new to me.  Patient is reporting that she feels a heaviness and achiness and both of her legs particularly at night.  She denies any specific pain.  She knows that she has varicose veins but is never had these evaluated.         Current medicines (including changes today)  Current Outpatient Prescriptions   Medication Sig Dispense Refill   • terbinafine (LAMISIL) 250 MG Tab Take 1 Tab by mouth every day. 45 Tab 1   • Calcium Carbonate-Vit D-Min (CALCIUM 1200 PO) Take  by mouth.     • omeprazole (PRILOSEC) 20 MG delayed-release capsule Take 20 mg by mouth every day.     • Cholecalciferol (VITAMIN D3) 2000 UNITS Tab Take  by mouth.     • meloxicam (MOBIC) 15 MG tablet Take 1 Tab by mouth every day. 90 Tab 1   • triamcinolone acetonide (KENALOG) 0.1 % Cream Apply to affected area twice daily for two weeks 1 Tube 0     No current facility-administered medications for this visit.      She  has a past medical history of Gyn exam; Heart murmur; Hyperlipidemia; Pap smear; Post menopausal problems; and Vitamin D deficiency. She also has no past medical history of ASTHMA or Diabetes.    ROS   No chest pain, no shortness of breath, no abdominal pain  + Achiness/heaviness and bilateral lower extremities     Objective:     Blood  "pressure 112/62, pulse 69, temperature 36.1 °C (97 °F), height 1.651 m (5' 5\"), weight 75.3 kg (166 lb), SpO2 95 %. Body mass index is 27.62 kg/m².   Physical Exam:  Alert, oriented in no acute distress.  Eye contact is good, speech goal directed, affect calm  HEENT: conjunctiva non-injected, sclera non-icteric.  Pinna normal. Oral mucous membranes pink and moist with no lesions.  Neck No adenopathy or masses in the neck or supraclavicular regions.  Lungs: clear to auscultation bilaterally with good excursion.  CV: regular rate and rhythm.  Abdomen: soft, nontender, No CVAT  Ext: no edema, color normal, vascularity normal, temperature normal; multiple varicosities noted on both legs        Assessment and Plan:   The following treatment plan was discussed   1. OM (onychomycosis)  COMP METABOLIC PANEL    Uncontrolled.  Due to medication holiday, recheck labs at the beginning of October, if normal liver function, restart medication   2. Varicose vein of leg  REFERRAL TO VASCULAR MEDICINE Reason for Referral? Established Vascular Disease    Uncontrolled; since patient is symptomatic, referral to vascular medicine   3. Thyroid mass  US-SOFT TISSUES OF HEAD - NECK    Uncontrolled; ultrasound ordered for reevaluation   4. Screening mammogram, encounter for  MA-SCREEN MAMMO W/CAD-BILAT    Annual screening mammogram; patient denies any breast issues today   5. Need for vaccination  PNEUMOCOCCAL POLYSACCHARIDE VACCINE 23-VALENT =>3YO SQ/IM    Age appropriate immunization provided; patient tolerated procedure well       Followup: Return in about 6 months (around 2/10/2019) for medication review, Short.            "

## 2018-08-14 ENCOUNTER — HOSPITAL ENCOUNTER (OUTPATIENT)
Dept: RADIOLOGY | Facility: MEDICAL CENTER | Age: 68
End: 2018-08-14
Attending: FAMILY MEDICINE
Payer: MEDICARE

## 2018-08-14 DIAGNOSIS — E07.9 THYROID MASS: ICD-10-CM

## 2018-08-14 DIAGNOSIS — Z12.31 SCREENING MAMMOGRAM, ENCOUNTER FOR: ICD-10-CM

## 2018-08-14 PROCEDURE — 76536 US EXAM OF HEAD AND NECK: CPT

## 2018-08-14 PROCEDURE — 77067 SCR MAMMO BI INCL CAD: CPT

## 2018-08-15 ENCOUNTER — TELEPHONE (OUTPATIENT)
Dept: MEDICAL GROUP | Facility: PHYSICIAN GROUP | Age: 68
End: 2018-08-15

## 2018-08-15 DIAGNOSIS — R92.30 DENSE BREAST TISSUE: ICD-10-CM

## 2018-08-15 NOTE — TELEPHONE ENCOUNTER
Shannan says that every year she pays the extra 140$ to have the more in depth scan on her breast. Can you place an order?

## 2018-11-01 ENCOUNTER — HOSPITAL ENCOUNTER (OUTPATIENT)
Dept: RADIOLOGY | Facility: MEDICAL CENTER | Age: 68
End: 2018-11-01
Attending: FAMILY MEDICINE
Payer: MEDICARE

## 2018-11-01 DIAGNOSIS — R92.30 DENSE BREAST TISSUE: ICD-10-CM

## 2018-11-01 PROCEDURE — 76641 ULTRASOUND BREAST COMPLETE: CPT

## 2018-11-26 ENCOUNTER — APPOINTMENT (RX ONLY)
Dept: URBAN - METROPOLITAN AREA CLINIC 31 | Facility: CLINIC | Age: 68
Setting detail: DERMATOLOGY
End: 2018-11-26

## 2018-11-26 DIAGNOSIS — L57.0 ACTINIC KERATOSIS: ICD-10-CM

## 2018-11-26 PROCEDURE — ? LIQUID NITROGEN

## 2018-11-26 PROCEDURE — 17000 DESTRUCT PREMALG LESION: CPT

## 2018-11-26 PROCEDURE — ? COUNSELING

## 2018-11-26 ASSESSMENT — LOCATION ZONE DERM: LOCATION ZONE: NOSE

## 2018-11-26 ASSESSMENT — LOCATION DETAILED DESCRIPTION DERM: LOCATION DETAILED: NASAL DORSUM

## 2018-11-26 ASSESSMENT — LOCATION SIMPLE DESCRIPTION DERM: LOCATION SIMPLE: NOSE

## 2018-11-26 NOTE — HPI: SKIN LESION
Is This A New Presentation, Or A Follow-Up?: Skin Lesion
How Severe Is Your Skin Lesion?: mild
Has Your Skin Lesion Been Treated?: not been treated
Additional History: Patient denies any changes with her moles and no tender or bleeding spots

## 2018-12-03 ENCOUNTER — OFFICE VISIT (OUTPATIENT)
Dept: MEDICAL GROUP | Facility: PHYSICIAN GROUP | Age: 68
End: 2018-12-03
Payer: MEDICARE

## 2018-12-03 VITALS
OXYGEN SATURATION: 96 % | HEART RATE: 81 BPM | TEMPERATURE: 98.3 F | DIASTOLIC BLOOD PRESSURE: 76 MMHG | BODY MASS INDEX: 28.82 KG/M2 | WEIGHT: 173 LBS | HEIGHT: 65 IN | SYSTOLIC BLOOD PRESSURE: 122 MMHG

## 2018-12-03 DIAGNOSIS — E78.5 DYSLIPIDEMIA: ICD-10-CM

## 2018-12-03 DIAGNOSIS — E07.9 THYROID MASS: ICD-10-CM

## 2018-12-03 DIAGNOSIS — R01.1 HEART MURMUR: ICD-10-CM

## 2018-12-03 DIAGNOSIS — G89.29 CHRONIC RIGHT-SIDED THORACIC BACK PAIN: ICD-10-CM

## 2018-12-03 DIAGNOSIS — B35.1 OM (ONYCHOMYCOSIS): ICD-10-CM

## 2018-12-03 DIAGNOSIS — G89.29 CHRONIC PAIN OF RIGHT KNEE: ICD-10-CM

## 2018-12-03 DIAGNOSIS — L57.0 AK (ACTINIC KERATOSIS): ICD-10-CM

## 2018-12-03 DIAGNOSIS — M85.852 OSTEOPENIA OF NECK OF LEFT FEMUR: ICD-10-CM

## 2018-12-03 DIAGNOSIS — M54.6 CHRONIC RIGHT-SIDED THORACIC BACK PAIN: ICD-10-CM

## 2018-12-03 DIAGNOSIS — I83.93 VARICOSE VEINS OF BOTH LOWER EXTREMITIES, UNSPECIFIED WHETHER COMPLICATED: ICD-10-CM

## 2018-12-03 DIAGNOSIS — Z23 NEED FOR VACCINATION: ICD-10-CM

## 2018-12-03 DIAGNOSIS — R10.9 RIGHT SIDED ABDOMINAL PAIN: ICD-10-CM

## 2018-12-03 DIAGNOSIS — E55.9 VITAMIN D INSUFFICIENCY: ICD-10-CM

## 2018-12-03 DIAGNOSIS — K21.9 GASTROESOPHAGEAL REFLUX DISEASE WITHOUT ESOPHAGITIS: ICD-10-CM

## 2018-12-03 DIAGNOSIS — M25.561 CHRONIC PAIN OF RIGHT KNEE: ICD-10-CM

## 2018-12-03 PROBLEM — M79.672 PAIN OF LEFT HEEL: Status: RESOLVED | Noted: 2017-06-22 | Resolved: 2018-12-03

## 2018-12-03 PROCEDURE — 99214 OFFICE O/P EST MOD 30 MIN: CPT | Performed by: NURSE PRACTITIONER

## 2018-12-03 NOTE — ASSESSMENT & PLAN NOTE
This is a chronic problem for the patient that is uncontrolled.  The patient is requesting to restart terbinafine to help with her toenail fungus.  She has stopped taking the medication on July 1, 2018 and has taken more than a 3-month break from the medication.  She is requesting updated labs so that this medication may be restarted.

## 2018-12-03 NOTE — ASSESSMENT & PLAN NOTE
This is a chronic problem for the patient that is unchanged.  Her last bone density scan was completed on 7/7/2016 which revealed osteopenia. Supplements  The patient continues to take over-the-counter calcium and vitamin D.  She denies any falls.

## 2018-12-03 NOTE — ASSESSMENT & PLAN NOTE
This is a chronic problem for the patient that is uncontrolled. The patient's most recent lipid profile was completed on 8/4/2017 with a total cholesterol of 242, triglycerides 238, HDL 46, .  The patient reports that her diet is poor as it is high in sweets, carbs, and red meat.  In regards to exercise, she states that she does walk approximately 2 miles per day around [place] with her dog and has been, otherwise she does not get any regular aerobic exercise.  She does report that she gets about 3 miles per day of walking when she vacations to [place].  She has never taken a medication for her elevated cholesterol levels.

## 2018-12-03 NOTE — ASSESSMENT & PLAN NOTE
This is a chronic problem for the patient that is uncontrolled.  The patient is being followed for this issue by [facility], and will have a follow-up around March 2019.  The patient reports that she tried to wear knee-high compression stockings which caused more pain and swelling around the knee.  The patient will be taking a flight to [place] in January and plans to wear thigh-high compression stockings and exercise while on the flight.  She denies any redness, swelling, tenderness.

## 2018-12-03 NOTE — ASSESSMENT & PLAN NOTE
"This is a chronic problem for the patient that is controlled.  The patient reports that her right-sided low back pain is more \"annoying than painful\".  Patient has a prescription of meloxicam that she received for a right knee injury from 2015.  She states that she will take the meloxicam for her back pain occasionally, but it is rare.  She reports that she will probably take it more frequently when she is in [place] as he walks 3 miles per day while there and the pain is worse with increased activity levels.  She denies any numbness or tingling.  "

## 2018-12-03 NOTE — ASSESSMENT & PLAN NOTE
This is a chronic problem for the patient that is unstable.  The patient's last vitamin D level was 25 on 8/4/2017.  Patient reports that she takes vitamin D3 5000 units/day plus a calcium supplement that has vitamin D in it.

## 2018-12-03 NOTE — ASSESSMENT & PLAN NOTE
"This is a chronic problem for the patient that is stable.  The patient is being seen by dermatology for this issue.  At her last annual checkup it was noted that she had a \"precancerous spot\" on her nose which was removed.  2 weeks ago the patient noticed that there was a lesion in the same area so she went to her dermatologist last week to have it evaluated, which was again removed.  The patient continues follow-up with dermatology for these issues.  "

## 2018-12-03 NOTE — ASSESSMENT & PLAN NOTE
This is a chronic problem for the patient that is stable. The patient has had a soft tissue ultrasound of her neck on 6/23/2017, 12/26/2017, 8/14/2018.  The most recent ultrasound in August 2018 showed that the mass had not changed in size and no further intervention was recommended at that time.  The patient denies any difficulty swallowing, fatigue, cold/heat intolerance, constipation, weight gain, skin changes, hair changes, hair loss, brittle nails, or edema.

## 2018-12-03 NOTE — PROGRESS NOTES
"CC:   Chief Complaint   Patient presents with   • Establish Care   • Medication Refill     lamisil        HISTORY OF THE PRESENT ILLNESS: Patient is a 68 y.o. female. This pleasant patient is here today to establish care and discuss multiple issues as listed below.    Health Maintenance: Completed    Vitamin D insufficiency  This is a chronic problem for the patient that is unstable.  The patient's last vitamin D level was 25 on 8/4/2017.  Patient reports that she takes vitamin D3 5000 units/day plus a calcium supplement that has vitamin D in it.    Varicose vein of leg  This is a chronic problem for the patient that is uncontrolled.  The patient is being followed for this issue by [facility], and will have a follow-up around March 2019.  The patient reports that she tried to wear knee-high compression stockings which caused more pain and swelling around the knee.  The patient will be taking a flight to her [place] in January and plans to wear thigh-high compression stockings and exercise while on the flight.  She denies any redness, swelling, tenderness.    Thyroid mass  This is a chronic problem for the patient that is stable. The patient has had a soft tissue ultrasound of her neck on 6/23/2017, 12/26/2017, 8/14/2018.  The most recent ultrasound in August 2018 showed that the mass had not changed in size and no further intervention was recommended at that time.  The patient denies any difficulty swallowing, fatigue, cold/heat intolerance, constipation, weight gain, skin changes, hair changes, hair loss, brittle nails, or edema.    Right-sided thoracic back pain  This is a chronic problem for the patient that is controlled.  The patient reports that her right-sided low back pain is more \"annoying than painful\".  Patient has a prescription of meloxicam that she received for a right knee injury from 2015.  She states that she will take the meloxicam for her back pain occasionally, but it is rare.  She reports " "that she will probably take it more frequently when she is in [place] as he walks 3 miles per day while there and the pain is worse with increased activity levels.  She denies any numbness or tingling.    Right sided abdominal pain  This is a chronic problem for the patient that is improving.  The patient reports that she will occasionally have some pain on her right upper abdomen just beneath her breast right on the rib.  The patient has noted that her pain symptoms are usually worse after eating popcorn and onions.  She also reports that she had x-rays for this issue in the past and was told there was osteoarthritis in the area.    Osteopenia of neck of left femur  This is a chronic problem for the patient that is unchanged.  Her last bone density scan was completed on 7/7/2016 which revealed osteopenia. Supplements  The patient continues to take over-the-counter calcium and vitamin D.  She denies any falls.    Heart murmur  This is a chronic problem for the patient that was noted on an echo in 2008 that reported \"mild mitral regurgitation and mild tricuspid regurgitation\".  The patient reports that this is a stable problem that is not followed by cardiology.  The patient reports that she is only symptomatic occasionally when she raises both of her arms above her head which causes her to get slightly dizzy.  She denies any loss of consciousness.    Gastroesophageal reflux disease without esophagitis  This is a chronic problem that is controlled.  The patient reports that she takes omeprazole 20 mg 1 time per day which is sufficient in controlling acid reflux symptoms.  The patient does report that if she misses the medication she has immediate return of acid reflux symptoms.  She does note that her symptoms are worse when she eats popcorn and/or onions so she tries to avoid these.    Mixed hyperlipidemia  This is a chronic problem for the patient that is uncontrolled. The patient's most recent lipid profile was " "completed on 8/4/2017 with a total cholesterol of 242, triglycerides 238, HDL 46, .  The patient reports that her diet is poor as it is high in sweets, carbs, and red meat.  In regards to exercise, she states that she does walk approximately 2 miles per day around the [place] with her dog and has been, otherwise she does not get any regular aerobic exercise.  She does report that she gets about 3 miles per day of walking when she vacations to [place].  She has never taken a medication for her elevated cholesterol levels.    Chronic pain of right knee  This is a chronic problem for the patient that is stable.  The initial injury started in June 2016 after she strained her right knee while gardening.  She has a prescription for meloxicam 15 mg to be taken daily as needed, patient states that she rarely needs to use this medication as her knee does not bother her too much.  She does note that when she is more active, for example when she goes to [place] and walks 3 miles per day, she will need to take the medication more, however it is not as bothersome when she is not as active.    OM (onychomycosis)  This is a chronic problem for the patient that is uncontrolled.  The patient is requesting to restart terbinafine to help with her toenail fungus.  She has stopped taking the medication on July 1, 2018 and has taken more than a 3-month break from the medication.  She is requesting updated labs so that this medication may be restarted.    AK (actinic keratosis)  This is a chronic problem for the patient that is stable.  The patient is being seen by dermatology for this issue.  At her last annual checkup it was noted that she had a \"precancerous spot\" on her nose which was removed.  2 weeks ago the patient noticed that there was a lesion in the same area so she went to her dermatologist last week to have it evaluated, which was again removed.  The patient continues follow-up with dermatology for these " issues.    Allergies: Pcn [penicillins]    Current Outpatient Prescriptions Ordered in [facility]   Medication Sig Dispense Refill   • Cholecalciferol (VITAMIN D3) 5000 units Tab Take  by mouth.     • Zoster Vac Recomb Adjuvanted (SHINGRIX) 50 MCG Recon Susp 0.5 mL by Intramuscular route Once for 1 dose. 0.5 mL 0   • omeprazole (PRILOSEC) 20 MG delayed-release capsule Take 20 mg by mouth every day.     • terbinafine (LAMISIL) 250 MG Tab Take 1 Tab by mouth every day. (Patient not taking: Reported on 12/3/2018) 45 Tab 1   • meloxicam (MOBIC) 15 MG tablet Take 1 Tab by mouth every day. 90 Tab 1   • triamcinolone acetonide (KENALOG) 0.1 % Cream Apply to affected area twice daily for two weeks 1 Tube 0   • Calcium Carbonate-Vit D-Min (CALCIUM 1200 PO) Take  by mouth.       No current Epic-ordered facility-administered medications on file.        Past Medical History:   Diagnosis Date   • Gyn exam    • Heart murmur    • Hyperlipidemia    • Pap smear    • Post menopausal problems    • Varicose veins of both legs with edema    • Vitamin D deficiency        Past Surgical History:   Procedure Laterality Date   • TONSILLECTOMY         Social History   Substance Use Topics   • Smoking status: Never Smoker   • Smokeless tobacco: Never Used   • Alcohol use No       Social History     Social History Narrative   • No narrative on file       Family History   Problem Relation Age of Onset   • Heart Disease Mother         tachycardia, pacemaker   • Cancer Mother         colon ca - resected   • Psychiatry Father         suicide   • No Known Problems Brother    • Cancer Brother         Colon cancer   • Stroke Brother    • Alcohol/Drug Brother         marijuana   • Hypertension Brother    • Hypertension Maternal Aunt    • Alcohol/Drug Maternal Uncle    • Asthma Maternal Grandmother    • Heart Disease Maternal Grandmother    • Alcohol/Drug Maternal Grandfather    • Heart Disease Paternal Grandmother    • No Known Problems Paternal Grandfather   "      ROS:     - Constitutional:  Negative for fever, chills, unexpected weight change, night sweats, body aches, sleep issues,  and fatigue/generalized weakness.     - HEENT:  Negative for headaches, vision changes, hearing changes, ear pain, tinnitus, ear discharge, rhinorrhea, sinus congestion, sneezing, sore throat, and neck pain.      - Respiratory:  Negative for cough, shortness of breath, sputum production, hemoptysis, chest congestion, dyspnea, wheezing, and crackles.      - Cardiovascular:  Negative for chest pain, palpitations, NOVAK, paroxsymal nocturnal dyspnea, orthopnea, and bilateral lower extremity edema.     - Gastrointestinal:  Negative for heartburn, nausea, vomiting, abdominal pain, hematochezia, melena, diarrhea, constipation, and greasy/foul-smelling stools.     - Genitourinary:  Negative for dysuria, nocturia, polyuria, hematuria, pyuria, urinary urgency, urinary frequency, and urinary incontinence.     - Musculoskeletal:  Negative for myalgias, back pain, and joint pain.     - Skin: Positive for toenail fungus.  Negative for rash, sores, lumps, itching, cyanotic skin color change.     - Neurological:  Negative for dizziness, tingling, tremors, focal sensory deficit, focal weakness and headaches.     - Endo/Heme/Allergies:  Does not bruise/bleed easily. Denies cold/heat intolerance.     - Psychiatric/Behavioral: Negative for depression, suicidal/homicidal ideation and memory loss.        Exam: Blood pressure 122/76, pulse 81, temperature 36.8 °C (98.3 °F), temperature source Temporal, height 1.651 m (5' 5\"), weight 78.5 kg (173 lb), SpO2 96 %. Body mass index is 28.79 kg/m².    General:  Normal appearing. No distress.  HEENT:  Normocephalic. Eyes conjunctiva clear lids without ptosis, pupils equal and reactive to light accommodation, ears normal shape and contour.   Neck:  Supple without JVD or bruit. Thyroid is not enlarged.  Pulmonary:  Clear to ausculation.  Normal effort. No rales, ronchi, " or wheezing.  Cardiovascular:  Regular rate and rhythm without murmur. Carotid and radial pulses are intact and equal bilaterally.  Neurologic:  Grossly nonfocal.\Skin: Positive for bilateral toenail fungus/nail thickening.  Warm and dry.  No obvious lesions.  Musculoskeletal:  Normal gait. No extremity cyanosis, clubbing, or edema.  Psych:  Normal mood and affect. Alert and oriented x3. Judgment and insight is normal.    Assessment/Plan  1.  Mixed hyperlipidemia  This is a chronic problem for the patient that was uncontrolled as of 8/4/2017.  The patient continues to report that she has a poor diet that is high in sweets, carbs, and red meat.  She also does not get regular exercise.  We will plan to recheck a CMP and a fasting lipid profile, follow-up will depend on labs.  - COMP METABOLIC PANEL; Future  - Lipid Profile; Future    2. OM (onychomycosis)  This is a chronic problem for the patient that is uncontrolled.  The patient has been off of her terbinafine since July 2018 and is requesting to be restarted.  We will check a CMP to assess liver function prior to refill of that medication.  I will update the patient through my chart with results and whether or not the medication was refilled.  - COMP METABOLIC PANEL; Future    3. Vitamin D insufficiency  This is a chronic problem for the patient that is uncontrolled.  The patient continues to take over-the-counter vitamin D supplements, continue current plan.    4. Varicose veins of both lower extremities, unspecified whether complicated  This is a chronic problem for the patient that is uncontrolled and managed by [facility].  Keep follow-up with specialist around March 2019.    5. Thyroid mass  This is a chronic problem for the patient that is stable.  The patient has had 3 ultrasounds of her thyroid mass which remain unchanged.  Reviewed with patient signs and symptoms of thyroid dysfunction as well as to notify me if she begins having difficulties with  swallowing, which she currently denies.    6. Chronic right-sided thoracic back pain  7. Right sided abdominal pain  This is a chronic problem for the patient that is improving.  The patient can relate the pain to certain foods and states that if she avoids the foods the pain is rare.  She will take her previously prescribed meloxicam if the pain is bothersome, she does not need a refill at this time.    8. Osteopenia of neck of left femur  This is a chronic problem for the patient.  The patient's last bone density scan was on 7/11/2016, next bone density scan due 7/7/2021.  The patient continues to take over-the-counter vitamin D and calcium supplementation.    9. Heart murmur  This is a chronic problem for the patient that is stable.  The patient was last evaluated for this issue with an echocardiogram in 2008 which noted mild mitral and tricuspid regurgitation.  The patient reports that she is asymptomatic as long as she does not raise both of her arms above her head at the same time, which will cause some dizziness.  Advised the patient to notify me if this becomes more frequent, or if she has any syncope.    10. Gastroesophageal reflux disease without esophagitis  This is a chronic problem patient that is managed well with over-the-counter omeprazole 20 mg/day.  Continue current plan.    11. Chronic pain of right knee  This is a chronic problem for the patient that is stable and managed with as needed meloxicam 15 mg.  The patient does not need a refill at this time.    12. AK (actinic keratosis)  This is a chronic problem for the patient that is monitored by dermatology.  Patient was last seen about 2 weeks ago and will continue follow-ups for surveillance and other concerning skin lesions.    13. Need for vaccination  Printed prescription for Shingrix vaccine provided to patient, advised her to call pharmacy to check for stock and/or get on a wait list.  - Zoster Vac Recomb Adjuvanted (SHINGRIX) 50 MCG Recon  Susp; 0.5 mL by Intramuscular route Once for 1 dose.  Dispense: 0.5 mL; Refill: 0    Educated in proper administration of medication(s) ordered today including safety, possible SE, risks, benefits, rationale and alternatives to therapy.   Supportive care, differential diagnoses, and indications for immediate follow-up discussed with patient.    Pathogenesis of diagnosis discussed including typical length and natural progression.    Instructed to return to clinic or nearest emergency department for any change in condition, further concerns, or worsening of symptoms.  Patient states understanding of the plan of care and discharge instructions.    Return in about 1 year (around 12/3/2019) for Preventative Annual, As needed.    Please note that this dictation was created using voice recognition software. I have made every reasonable attempt to correct obvious errors, but I expect that there are errors of grammar and possibly content that I did not discover before finalizing the note.

## 2018-12-03 NOTE — ASSESSMENT & PLAN NOTE
"This is a chronic problem for the patient that was noted on an echo in 2008 that reported \"mild mitral regurgitation and mild tricuspid regurgitation\".  The patient reports that this is a stable problem that is not followed by cardiology.  The patient reports that she is only symptomatic occasionally when she raises both of her arms above her head which causes her to get slightly dizzy.  She denies any loss of consciousness.  "

## 2018-12-03 NOTE — ASSESSMENT & PLAN NOTE
This is a chronic problem that is controlled.  The patient reports that she takes omeprazole 20 mg 1 time per day which is sufficient in controlling acid reflux symptoms.  The patient does report that if she misses the medication she has immediate return of acid reflux symptoms.  She does note that her symptoms are worse when she eats popcorn and/or onions so she tries to avoid these.

## 2018-12-03 NOTE — ASSESSMENT & PLAN NOTE
This is a chronic problem for the patient that is improving.  The patient reports that she will occasionally have some pain on her right upper abdomen just beneath her breast right on the rib.  The patient has noted that her pain symptoms are usually worse after eating popcorn and onions.  She also reports that she had x-rays for this issue in the past and was told there was osteoarthritis in the area.

## 2018-12-03 NOTE — ASSESSMENT & PLAN NOTE
This is a chronic problem for the patient that is stable.  The initial injury started in June 2016 after she strained her right knee while gardening.  She has a prescription for meloxicam 15 mg to be taken daily as needed, patient states that she rarely needs to use this medication as her knee does not bother her too much.  She does note that when she is more active, for example when she goes to [place] and walks 3 miles per day, she will need to take the medication more, however it is not as bothersome when she is not as active.

## 2018-12-04 ENCOUNTER — HOSPITAL ENCOUNTER (OUTPATIENT)
Dept: LAB | Facility: MEDICAL CENTER | Age: 68
End: 2018-12-04
Attending: NURSE PRACTITIONER
Payer: MEDICARE

## 2018-12-04 DIAGNOSIS — E78.5 DYSLIPIDEMIA: ICD-10-CM

## 2018-12-04 LAB
ALBUMIN SERPL BCP-MCNC: 4.2 G/DL (ref 3.2–4.9)
ALBUMIN/GLOB SERPL: 1.6 G/DL
ALP SERPL-CCNC: 45 U/L (ref 30–99)
ALT SERPL-CCNC: 27 U/L (ref 2–50)
ANION GAP SERPL CALC-SCNC: 8 MMOL/L (ref 0–11.9)
AST SERPL-CCNC: 16 U/L (ref 12–45)
BILIRUB SERPL-MCNC: 0.5 MG/DL (ref 0.1–1.5)
BUN SERPL-MCNC: 19 MG/DL (ref 8–22)
CALCIUM SERPL-MCNC: 9.4 MG/DL (ref 8.5–10.5)
CHLORIDE SERPL-SCNC: 107 MMOL/L (ref 96–112)
CHOLEST SERPL-MCNC: 202 MG/DL (ref 100–199)
CO2 SERPL-SCNC: 27 MMOL/L (ref 20–33)
CREAT SERPL-MCNC: 0.69 MG/DL (ref 0.5–1.4)
FASTING STATUS PATIENT QL REPORTED: NORMAL
GLOBULIN SER CALC-MCNC: 2.6 G/DL (ref 1.9–3.5)
GLUCOSE SERPL-MCNC: 96 MG/DL (ref 65–99)
HDLC SERPL-MCNC: 45 MG/DL
LDLC SERPL CALC-MCNC: 126 MG/DL
POTASSIUM SERPL-SCNC: 3.8 MMOL/L (ref 3.6–5.5)
PROT SERPL-MCNC: 6.8 G/DL (ref 6–8.2)
SODIUM SERPL-SCNC: 142 MMOL/L (ref 135–145)
TRIGL SERPL-MCNC: 154 MG/DL (ref 0–149)

## 2018-12-04 PROCEDURE — 36415 COLL VENOUS BLD VENIPUNCTURE: CPT

## 2018-12-04 PROCEDURE — 80053 COMPREHEN METABOLIC PANEL: CPT

## 2018-12-04 PROCEDURE — 80061 LIPID PANEL: CPT

## 2018-12-05 DIAGNOSIS — B35.1 OM (ONYCHOMYCOSIS): ICD-10-CM

## 2018-12-05 RX ORDER — TERBINAFINE HYDROCHLORIDE 250 MG/1
250 TABLET ORAL DAILY
Qty: 90 TAB | Refills: 0 | Status: SHIPPED | OUTPATIENT
Start: 2018-12-05 | End: 2019-09-26 | Stop reason: SDUPTHER

## 2018-12-05 NOTE — PROGRESS NOTES
1. OM (onychomycosis)  The patients CMP came back within normal limits. 12 week course of terbinafine sent to the patients pharmacy. Updated the patient through my chart that if we need to continue treatment beyond 12 weeks we will need to recheck a CMP prior to any additional refills.  - terbinafine (LAMISIL) 250 MG Tab; Take 1 Tab by mouth every day.  Dispense: 90 Tab; Refill: 0

## 2018-12-27 ENCOUNTER — APPOINTMENT (OUTPATIENT)
Dept: RADIOLOGY | Facility: MEDICAL CENTER | Age: 68
End: 2018-12-27
Attending: EMERGENCY MEDICINE
Payer: MEDICARE

## 2018-12-27 ENCOUNTER — HOSPITAL ENCOUNTER (OUTPATIENT)
Facility: MEDICAL CENTER | Age: 68
LOS: 1 days | End: 2018-12-28
Attending: EMERGENCY MEDICINE | Admitting: INTERNAL MEDICINE
Payer: MEDICARE

## 2018-12-27 ENCOUNTER — OFFICE VISIT (OUTPATIENT)
Dept: MEDICAL GROUP | Facility: MEDICAL CENTER | Age: 68
End: 2018-12-27
Payer: MEDICARE

## 2018-12-27 VITALS
WEIGHT: 172 LBS | SYSTOLIC BLOOD PRESSURE: 130 MMHG | HEART RATE: 74 BPM | BODY MASS INDEX: 28.66 KG/M2 | DIASTOLIC BLOOD PRESSURE: 78 MMHG | OXYGEN SATURATION: 98 % | RESPIRATION RATE: 16 BRPM | HEIGHT: 65 IN | TEMPERATURE: 97.9 F

## 2018-12-27 DIAGNOSIS — R07.9 CHEST PAIN, UNSPECIFIED TYPE: ICD-10-CM

## 2018-12-27 DIAGNOSIS — G43.909 MIGRAINE WITHOUT STATUS MIGRAINOSUS, NOT INTRACTABLE, UNSPECIFIED MIGRAINE TYPE: ICD-10-CM

## 2018-12-27 PROBLEM — D72.829 LEUCOCYTOSIS: Status: ACTIVE | Noted: 2018-12-27

## 2018-12-27 PROBLEM — R73.9 HYPERGLYCEMIA: Status: ACTIVE | Noted: 2018-12-27

## 2018-12-27 LAB
ALBUMIN SERPL BCP-MCNC: 4.6 G/DL (ref 3.2–4.9)
ALBUMIN/GLOB SERPL: 1.7 G/DL
ALP SERPL-CCNC: 47 U/L (ref 30–99)
ALT SERPL-CCNC: 27 U/L (ref 2–50)
ANION GAP SERPL CALC-SCNC: 8 MMOL/L (ref 0–11.9)
APTT PPP: 23.9 SEC (ref 24.7–36)
AST SERPL-CCNC: 15 U/L (ref 12–45)
BASOPHILS # BLD AUTO: 0.1 % (ref 0–1.8)
BASOPHILS # BLD: 0.02 K/UL (ref 0–0.12)
BILIRUB SERPL-MCNC: 0.4 MG/DL (ref 0.1–1.5)
BNP SERPL-MCNC: 6 PG/ML (ref 0–100)
BUN SERPL-MCNC: 14 MG/DL (ref 8–22)
CALCIUM SERPL-MCNC: 9.1 MG/DL (ref 8.5–10.5)
CHLORIDE SERPL-SCNC: 106 MMOL/L (ref 96–112)
CO2 SERPL-SCNC: 25 MMOL/L (ref 20–33)
CREAT SERPL-MCNC: 0.56 MG/DL (ref 0.5–1.4)
EKG IMPRESSION: NORMAL
EKG IMPRESSION: NORMAL
EOSINOPHIL # BLD AUTO: 0.01 K/UL (ref 0–0.51)
EOSINOPHIL NFR BLD: 0.1 % (ref 0–6.9)
ERYTHROCYTE [DISTWIDTH] IN BLOOD BY AUTOMATED COUNT: 40.4 FL (ref 35.9–50)
GLOBULIN SER CALC-MCNC: 2.7 G/DL (ref 1.9–3.5)
GLUCOSE SERPL-MCNC: 122 MG/DL (ref 65–99)
HCT VFR BLD AUTO: 42 % (ref 37–47)
HGB BLD-MCNC: 14.3 G/DL (ref 12–16)
IMM GRANULOCYTES # BLD AUTO: 0.05 K/UL (ref 0–0.11)
IMM GRANULOCYTES NFR BLD AUTO: 0.4 % (ref 0–0.9)
INR PPP: 0.92 (ref 0.87–1.13)
LIPASE SERPL-CCNC: 54 U/L (ref 11–82)
LYMPHOCYTES # BLD AUTO: 1.19 K/UL (ref 1–4.8)
LYMPHOCYTES NFR BLD: 8.8 % (ref 22–41)
MCH RBC QN AUTO: 29.9 PG (ref 27–33)
MCHC RBC AUTO-ENTMCNC: 34 G/DL (ref 33.6–35)
MCV RBC AUTO: 87.7 FL (ref 81.4–97.8)
MONOCYTES # BLD AUTO: 0.46 K/UL (ref 0–0.85)
MONOCYTES NFR BLD AUTO: 3.4 % (ref 0–13.4)
NEUTROPHILS # BLD AUTO: 11.73 K/UL (ref 2–7.15)
NEUTROPHILS NFR BLD: 87.2 % (ref 44–72)
NRBC # BLD AUTO: 0 K/UL
NRBC BLD-RTO: 0 /100 WBC
PLATELET # BLD AUTO: 280 K/UL (ref 164–446)
PMV BLD AUTO: 9.8 FL (ref 9–12.9)
POTASSIUM SERPL-SCNC: 3.7 MMOL/L (ref 3.6–5.5)
PROT SERPL-MCNC: 7.3 G/DL (ref 6–8.2)
PROTHROMBIN TIME: 12.5 SEC (ref 12–14.6)
RBC # BLD AUTO: 4.79 M/UL (ref 4.2–5.4)
SODIUM SERPL-SCNC: 139 MMOL/L (ref 135–145)
TROPONIN I SERPL-MCNC: <0.01 NG/ML (ref 0–0.04)
WBC # BLD AUTO: 13.5 K/UL (ref 4.8–10.8)

## 2018-12-27 PROCEDURE — 84484 ASSAY OF TROPONIN QUANT: CPT

## 2018-12-27 PROCEDURE — 96372 THER/PROPH/DIAG INJ SC/IM: CPT

## 2018-12-27 PROCEDURE — 700102 HCHG RX REV CODE 250 W/ 637 OVERRIDE(OP): Performed by: INTERNAL MEDICINE

## 2018-12-27 PROCEDURE — 93010 ELECTROCARDIOGRAM REPORT: CPT | Performed by: INTERNAL MEDICINE

## 2018-12-27 PROCEDURE — 36415 COLL VENOUS BLD VENIPUNCTURE: CPT

## 2018-12-27 PROCEDURE — A9270 NON-COVERED ITEM OR SERVICE: HCPCS | Performed by: INTERNAL MEDICINE

## 2018-12-27 PROCEDURE — 700111 HCHG RX REV CODE 636 W/ 250 OVERRIDE (IP): Performed by: INTERNAL MEDICINE

## 2018-12-27 PROCEDURE — 99214 OFFICE O/P EST MOD 30 MIN: CPT | Performed by: FAMILY MEDICINE

## 2018-12-27 PROCEDURE — 83690 ASSAY OF LIPASE: CPT

## 2018-12-27 PROCEDURE — 85610 PROTHROMBIN TIME: CPT

## 2018-12-27 PROCEDURE — 85025 COMPLETE CBC W/AUTO DIFF WBC: CPT

## 2018-12-27 PROCEDURE — 93005 ELECTROCARDIOGRAM TRACING: CPT | Performed by: EMERGENCY MEDICINE

## 2018-12-27 PROCEDURE — 93005 ELECTROCARDIOGRAM TRACING: CPT

## 2018-12-27 PROCEDURE — 83880 ASSAY OF NATRIURETIC PEPTIDE: CPT

## 2018-12-27 PROCEDURE — G0378 HOSPITAL OBSERVATION PER HR: HCPCS

## 2018-12-27 PROCEDURE — 93005 ELECTROCARDIOGRAM TRACING: CPT | Performed by: INTERNAL MEDICINE

## 2018-12-27 PROCEDURE — 99220 PR INITIAL OBSERVATION CARE,LEVL III: CPT | Performed by: INTERNAL MEDICINE

## 2018-12-27 PROCEDURE — 700105 HCHG RX REV CODE 258: Performed by: INTERNAL MEDICINE

## 2018-12-27 PROCEDURE — 99285 EMERGENCY DEPT VISIT HI MDM: CPT

## 2018-12-27 PROCEDURE — 80053 COMPREHEN METABOLIC PANEL: CPT

## 2018-12-27 PROCEDURE — 71045 X-RAY EXAM CHEST 1 VIEW: CPT

## 2018-12-27 PROCEDURE — 85730 THROMBOPLASTIN TIME PARTIAL: CPT

## 2018-12-27 RX ORDER — PHENOL 1.4 %
1200 AEROSOL, SPRAY (ML) MUCOUS MEMBRANE DAILY
COMMUNITY

## 2018-12-27 RX ORDER — FAMOTIDINE 20 MG/1
20 TABLET, FILM COATED ORAL DAILY
Status: ON HOLD | COMMUNITY
End: 2018-12-28

## 2018-12-27 RX ORDER — KETOROLAC TROMETHAMINE 30 MG/ML
15 INJECTION, SOLUTION INTRAMUSCULAR; INTRAVENOUS EVERY 6 HOURS PRN
Status: DISCONTINUED | OUTPATIENT
Start: 2018-12-27 | End: 2018-12-28 | Stop reason: HOSPADM

## 2018-12-27 RX ORDER — TRIAMCINOLONE ACETONIDE 1 MG/G
CREAM TOPICAL 2 TIMES DAILY PRN
Status: DISCONTINUED | OUTPATIENT
Start: 2018-12-27 | End: 2018-12-28 | Stop reason: HOSPADM

## 2018-12-27 RX ORDER — ASPIRIN 300 MG/1
300 SUPPOSITORY RECTAL DAILY
Status: DISCONTINUED | OUTPATIENT
Start: 2018-12-27 | End: 2018-12-28 | Stop reason: HOSPADM

## 2018-12-27 RX ORDER — TERBINAFINE HYDROCHLORIDE 250 MG/1
250 TABLET ORAL DAILY
Status: DISCONTINUED | OUTPATIENT
Start: 2018-12-27 | End: 2018-12-28 | Stop reason: HOSPADM

## 2018-12-27 RX ORDER — AMOXICILLIN 250 MG
2 CAPSULE ORAL 2 TIMES DAILY
Status: DISCONTINUED | OUTPATIENT
Start: 2018-12-27 | End: 2018-12-28 | Stop reason: HOSPADM

## 2018-12-27 RX ORDER — OMEPRAZOLE 20 MG/1
20 CAPSULE, DELAYED RELEASE ORAL DAILY
Status: DISCONTINUED | OUTPATIENT
Start: 2018-12-27 | End: 2018-12-27

## 2018-12-27 RX ORDER — ASPIRIN 325 MG
325 TABLET ORAL DAILY
Status: DISCONTINUED | OUTPATIENT
Start: 2018-12-27 | End: 2018-12-28 | Stop reason: HOSPADM

## 2018-12-27 RX ORDER — MELOXICAM 15 MG/1
15 TABLET ORAL
COMMUNITY
End: 2019-04-29

## 2018-12-27 RX ORDER — POLYETHYLENE GLYCOL 3350 17 G/17G
1 POWDER, FOR SOLUTION ORAL
Status: DISCONTINUED | OUTPATIENT
Start: 2018-12-27 | End: 2018-12-28 | Stop reason: HOSPADM

## 2018-12-27 RX ORDER — SODIUM CHLORIDE 9 MG/ML
INJECTION, SOLUTION INTRAVENOUS CONTINUOUS
Status: DISCONTINUED | OUTPATIENT
Start: 2018-12-27 | End: 2018-12-28 | Stop reason: HOSPADM

## 2018-12-27 RX ORDER — BISACODYL 10 MG
10 SUPPOSITORY, RECTAL RECTAL
Status: DISCONTINUED | OUTPATIENT
Start: 2018-12-27 | End: 2018-12-28 | Stop reason: HOSPADM

## 2018-12-27 RX ORDER — TRIAMCINOLONE ACETONIDE 1 MG/G
CREAM TOPICAL 2 TIMES DAILY PRN
COMMUNITY
End: 2019-04-30 | Stop reason: SDUPTHER

## 2018-12-27 RX ORDER — ASPIRIN 81 MG/1
324 TABLET, CHEWABLE ORAL DAILY
Status: DISCONTINUED | OUTPATIENT
Start: 2018-12-27 | End: 2018-12-28 | Stop reason: HOSPADM

## 2018-12-27 RX ADMIN — ENOXAPARIN SODIUM 40 MG: 100 INJECTION SUBCUTANEOUS at 13:58

## 2018-12-27 RX ADMIN — KETOROLAC TROMETHAMINE 15 MG: 30 INJECTION, SOLUTION INTRAMUSCULAR at 15:53

## 2018-12-27 RX ADMIN — ASPIRIN 325 MG: 325 TABLET ORAL at 13:54

## 2018-12-27 RX ADMIN — SODIUM CHLORIDE: 9 INJECTION, SOLUTION INTRAVENOUS at 13:54

## 2018-12-27 RX ADMIN — TERBINAFINE 250 MG: 250 TABLET ORAL at 18:43

## 2018-12-27 ASSESSMENT — ENCOUNTER SYMPTOMS
PALPITATIONS: 0
FEVER: 0
ORTHOPNEA: 0
NERVOUS/ANXIOUS: 0
FOCAL WEAKNESS: 0
SEIZURES: 0
DIARRHEA: 0
STRIDOR: 0
DEPRESSION: 0
DIZZINESS: 0
HEARTBURN: 0
SPUTUM PRODUCTION: 0
BACK PAIN: 0
NECK PAIN: 0
VOMITING: 0
NAUSEA: 0
MYALGIAS: 0
EYE REDNESS: 0
ABDOMINAL PAIN: 0
WEIGHT LOSS: 0
EYE DISCHARGE: 0
EYE PAIN: 0
HEADACHES: 0
INSOMNIA: 0
SHORTNESS OF BREATH: 0
CHILLS: 0
COUGH: 0
BLURRED VISION: 0

## 2018-12-27 ASSESSMENT — PATIENT HEALTH QUESTIONNAIRE - PHQ9
2. FEELING DOWN, DEPRESSED, IRRITABLE, OR HOPELESS: NOT AT ALL
SUM OF ALL RESPONSES TO PHQ9 QUESTIONS 1 AND 2: 0
1. LITTLE INTEREST OR PLEASURE IN DOING THINGS: NOT AT ALL

## 2018-12-27 ASSESSMENT — LIFESTYLE VARIABLES
ALCOHOL_USE: NO
ALCOHOL_USE: NO
DO YOU DRINK ALCOHOL: NO
EVER_SMOKED: NEVER

## 2018-12-27 ASSESSMENT — PAIN SCALES - GENERAL
PAINLEVEL_OUTOF10: 2
PAINLEVEL_OUTOF10: 2
PAINLEVEL_OUTOF10: 10

## 2018-12-27 NOTE — H&P
Hospital Medicine History and Physical      Date of Service  12/27/2018    Chief Complaint  Chief Complaint   Patient presents with   • Chest Pain     mid chest started this morning. describes pain as pressure and constant radiating to left arm and abdomen.   • Nausea       History of Presenting Illness  Derrell is a 68 y.o. female with no significant past medical history, who presents with chest pain started 2 AM this morning while she was making cookies.  Never had similar symptoms before.  She described the pain as pressure-like pain over the sternum area, constant in nature, 7/10 intensity, radiated to her left arm.  Her initial troponin and EKG were negative.  The patient was also found to have a little bit hypotensive with systolic blood pressure over 160 in ER.  She will be admitted for further evaluation of her chest pain.    Primary Care Physician  MAYCOL Gomez.      Code Status  Full code    Review of Systems  Review of Systems   Constitutional: Negative for chills, fever and weight loss.   HENT: Negative for congestion and nosebleeds.    Eyes: Negative for blurred vision, pain, discharge and redness.   Respiratory: Negative for cough, sputum production, shortness of breath and stridor.    Cardiovascular: Positive for chest pain. Negative for palpitations and orthopnea.   Gastrointestinal: Negative for abdominal pain, diarrhea, heartburn, nausea and vomiting.   Genitourinary: Negative for dysuria, frequency and urgency.   Musculoskeletal: Negative for back pain, myalgias and neck pain.   Skin: Negative for itching and rash.   Neurological: Negative for dizziness, focal weakness, seizures and headaches.   Psychiatric/Behavioral: Negative for depression. The patient is not nervous/anxious and does not have insomnia.      Please see HPI, all other systems were reviewed and are negative (AMA/CMS criteria)     Past Medical History  Past Medical History:   Diagnosis Date   • Gyn exam    • Heart  murmur    • Hyperlipidemia    • Pap smear    • Post menopausal problems    • Varicose veins of both legs with edema    • Vitamin D deficiency        Surgical History  Past Surgical History:   Procedure Laterality Date   • TONSILLECTOMY         Medications  No current facility-administered medications on file prior to encounter.      Current Outpatient Prescriptions on File Prior to Encounter   Medication Sig Dispense Refill   • terbinafine (LAMISIL) 250 MG Tab Take 1 Tab by mouth every day. 90 Tab 0   • Cholecalciferol (VITAMIN D3) 5000 units Tab Take  by mouth.     • meloxicam (MOBIC) 15 MG tablet Take 1 Tab by mouth every day. 90 Tab 1   • triamcinolone acetonide (KENALOG) 0.1 % Cream Apply to affected area twice daily for two weeks 1 Tube 0   • Calcium Carbonate-Vit D-Min (CALCIUM 1200 PO) Take  by mouth.     • omeprazole (PRILOSEC) 20 MG delayed-release capsule Take 20 mg by mouth every day.       Family History  Family History   Problem Relation Age of Onset   • Heart Disease Mother         tachycardia, pacemaker   • Cancer Mother         colon ca - resected   • Psychiatry Father         suicide   • No Known Problems Brother    • Cancer Brother         Colon cancer   • Stroke Brother    • Alcohol/Drug Brother         marijuana   • Hypertension Brother    • Hypertension Maternal Aunt    • Alcohol/Drug Maternal Uncle    • Asthma Maternal Grandmother    • Heart Disease Maternal Grandmother    • Alcohol/Drug Maternal Grandfather    • Heart Disease Paternal Grandmother    • No Known Problems Paternal Grandfather          Social History  Social History   Substance Use Topics   • Smoking status: Never Smoker   • Smokeless tobacco: Never Used   • Alcohol use No       Allergies  Allergies   Allergen Reactions   • Pcn [Penicillins] Hives        Physical Exam  Laboratory   Hemodynamics  Temp (24hrs), Av.9 °C (98.4 °F), Min:36.9 °C (98.4 °F), Max:36.9 °C (98.4 °F)   Temperature: 36.9 °C (98.4 °F)  Pulse  Av  Min:  72  Max: 74 Heart Rate (Monitored): 74  Blood Pressure : 127/88, NIBP: 149/72      Respiratory      Respiration: 17, Pulse Oximetry: 97 %             Physical Exam   Constitutional: She is oriented to person, place, and time. No distress.   HENT:   Head: Normocephalic and atraumatic.   Mouth/Throat: Oropharynx is clear and moist.   Eyes: Pupils are equal, round, and reactive to light. Conjunctivae and EOM are normal.   Neck: Normal range of motion. Neck supple. No tracheal deviation present. No thyromegaly present.   Cardiovascular: Normal rate and regular rhythm.    No murmur heard.  Pulmonary/Chest: Effort normal and breath sounds normal. No respiratory distress. She has no wheezes.   Abdominal: Soft. Bowel sounds are normal. She exhibits no distension. There is no tenderness.   Musculoskeletal: She exhibits no edema or tenderness.   Neurological: She is alert and oriented to person, place, and time. No cranial nerve deficit.   Skin: Skin is warm and dry. She is not diaphoretic. No erythema.   Psychiatric: She has a normal mood and affect. Her behavior is normal. Thought content normal.       Recent Labs      12/27/18   1027   WBC  13.5*   RBC  4.79   HEMOGLOBIN  14.3   HEMATOCRIT  42.0   MCV  87.7   MCH  29.9   MCHC  34.0   RDW  40.4   PLATELETCT  280   MPV  9.8     Recent Labs      12/27/18   1027   SODIUM  139   POTASSIUM  3.7   CHLORIDE  106   CO2  25   GLUCOSE  122*   BUN  14   CREATININE  0.56   CALCIUM  9.1     Recent Labs      12/27/18   1027   ALTSGPT  27   ASTSGOT  15   ALKPHOSPHAT  47   TBILIRUBIN  0.4   LIPASE  54   GLUCOSE  122*     Recent Labs      12/27/18   1027   APTT  23.9*   INR  0.92     Recent Labs      12/27/18   1027   BNPBTYPENAT  6         Lab Results   Component Value Date    TROPONINI <0.01 12/27/2018       Imaging  DX-CHEST-PORTABLE (1 VIEW)   Final Result      No acute cardiopulmonary abnormality.      NM-CARDIAC STRESS TEST    (Results Pending)     EKG  per my independant read:  QTc:  457, HR: 80, Normal Sinus Rhythm, no ST/T changes     Assessment/Plan     I anticipate this patient is appropriate for observation status at this time.    Hyperglycemia- (present on admission)   Assessment & Plan    Likely related to stress     Leucocytosis- (present on admission)   Assessment & Plan    Likely reactive     Chest pain- (present on admission)   Assessment & Plan    Trend trop and ekg  Monitor on tele  NPO  Stress test in am  Check dimer         Prophylaxis:  lovenox

## 2018-12-27 NOTE — ED NOTES
Pt ambulated back to room. Pt reporting baking cookies at 0200 this morning and feeling chest pressure. Pt states it initially felt like she was going to be sick, nauseated, no vomiting, now feels like an air pocket in her mid chest with burning in her epigastric area. Pt denies SOB, but states she feels like she can't quite take a deep enough breath.

## 2018-12-27 NOTE — ED PROVIDER NOTES
ED Provider Note    Scribed for Norberto Astudillo M.D. by Alejandrina Escalera. 12/27/2018  10:38 AM    Primary care provider: ELLEN Gomez  Means of arrival: Walk in  History obtained from: Patient  History limited by: None    CHIEF COMPLAINT  •  Chest Pain    HPI  Shannan Dove is a 68 y.o. female who presents to the Emergency Department for chest pain with an onset of today. The patient developed an acute onset of nausea and chest pain at 2:00 AM this morning while baking cookies. She describes her symptoms as constant pressure to her substernal chest radiating to her epigastrium and associated with shortness of breath. No alleviating factors were reported. Negative for fevers, chills, cough, nausea, vomiting, leg swelling or palpitations. History of hyperlipidemia and heart murmur. No prior history of cardiac disease, hypertension or diabetes.      REVIEW OF SYSTEMS  Pertinent positives include nausea, substernal chest pain, epigastric pain and shortness of breath.   Pertinent negatives include no fevers, chills, cough, nausea, vomiting, leg swelling or palpitations.    All other systems reviewed and negative. See HPI for further details. C.       PAST MEDICAL HISTORY  Patient has a past medical history of Gyn exam; Heart murmur; Hyperlipidemia; Pap smear; Post menopausal problems; Varicose veins of both legs with edema; and Vitamin D deficiency.      SURGICAL HISTORY   Patient has a past surgical history that includes tonsillectomy.      SOCIAL HISTORY  Social History   Substance Use Topics   • Smoking status: Never Smoker   • Smokeless tobacco: Never Used   • Alcohol use No      History   Drug Use No       FAMILY HISTORY  Family History   Problem Relation Age of Onset   • Heart Disease Mother         tachycardia, pacemaker   • Cancer Mother         colon ca - resected   • Psychiatry Father         suicide   • No Known Problems Brother    • Cancer Brother         Colon cancer   • Stroke Brother    •  "Alcohol/Drug Brother         marijuana   • Hypertension Brother    • Hypertension Maternal Aunt    • Alcohol/Drug Maternal Uncle    • Asthma Maternal Grandmother    • Heart Disease Maternal Grandmother    • Alcohol/Drug Maternal Grandfather    • Heart Disease Paternal Grandmother    • No Known Problems Paternal Grandfather        CURRENT MEDICATIONS  Home Medications    **Home medications have not yet been reviewed for this encounter**         ALLERGIES  Allergies   Allergen Reactions   • Pcn [Penicillins] Hives       PHYSICAL EXAM  VITAL SIGNS: /88   Pulse 72   Temp 36.9 °C (98.4 °F) (Temporal)   Resp 17   Ht 1.651 m (5' 5\")   Wt 77.8 kg (171 lb 8.3 oz)   SpO2 97%   BMI 28.54 kg/m²     Nursing note and vitals reviewed.    Constitutional: Well-developed and well-nourished. No distress.   HENT: Head is normocephalic and atraumatic. Oropharynx is clear and moist without exudate or erythema.   Eyes: Pupils are equal, round, and reactive to light. Conjunctiva are normal.   Cardiovascular: Normal rate and regular rhythm. 3 out of 6 systolic ejection murmur best heard at the right upper sternal border. Normal radial pulses.  Pulmonary/Chest: Breath sounds normal. No wheezes or rales.   Abdominal: Soft and non-tender. No distention    Musculoskeletal: Extremities exhibit normal range of motion without edema or tenderness.   Neurological: Awake, alert and oriented to person, place, and time. No focal deficits noted.  Skin: Skin is warm and dry. No rash.   Psychiatric: Normal mood and affect. Appropriate for clinical situation.      DIAGNOSTIC STUDIES / PROCEDURES    EKG Interpretation  See labs below. Interpreted by me.       LABS  Results for orders placed or performed during the hospital encounter of 12/27/18   CBC w/ Differential   Result Value Ref Range    WBC 13.5 (H) 4.8 - 10.8 K/uL    RBC 4.79 4.20 - 5.40 M/uL    Hemoglobin 14.3 12.0 - 16.0 g/dL    Hematocrit 42.0 37.0 - 47.0 %    MCV 87.7 81.4 - 97.8 fL "    MCH 29.9 27.0 - 33.0 pg    MCHC 34.0 33.6 - 35.0 g/dL    RDW 40.4 35.9 - 50.0 fL    Platelet Count 280 164 - 446 K/uL    MPV 9.8 9.0 - 12.9 fL    Neutrophils-Polys 87.20 (H) 44.00 - 72.00 %    Lymphocytes 8.80 (L) 22.00 - 41.00 %    Monocytes 3.40 0.00 - 13.40 %    Eosinophils 0.10 0.00 - 6.90 %    Basophils 0.10 0.00 - 1.80 %    Immature Granulocytes 0.40 0.00 - 0.90 %    Nucleated RBC 0.00 /100 WBC    Neutrophils (Absolute) 11.73 (H) 2.00 - 7.15 K/uL    Lymphs (Absolute) 1.19 1.00 - 4.80 K/uL    Monos (Absolute) 0.46 0.00 - 0.85 K/uL    Eos (Absolute) 0.01 0.00 - 0.51 K/uL    Baso (Absolute) 0.02 0.00 - 0.12 K/uL    Immature Granulocytes (abs) 0.05 0.00 - 0.11 K/uL    NRBC (Absolute) 0.00 K/uL   Complete Metabolic Panel (CMP)   Result Value Ref Range    Sodium 139 135 - 145 mmol/L    Potassium 3.7 3.6 - 5.5 mmol/L    Chloride 106 96 - 112 mmol/L    Co2 25 20 - 33 mmol/L    Anion Gap 8.0 0.0 - 11.9    Glucose 122 (H) 65 - 99 mg/dL    Bun 14 8 - 22 mg/dL    Creatinine 0.56 0.50 - 1.40 mg/dL    Calcium 9.1 8.5 - 10.5 mg/dL    AST(SGOT) 15 12 - 45 U/L    ALT(SGPT) 27 2 - 50 U/L    Alkaline Phosphatase 47 30 - 99 U/L    Total Bilirubin 0.4 0.1 - 1.5 mg/dL    Albumin 4.6 3.2 - 4.9 g/dL    Total Protein 7.3 6.0 - 8.2 g/dL    Globulin 2.7 1.9 - 3.5 g/dL    A-G Ratio 1.7 g/dL   Btype Natriuretic Peptide   Result Value Ref Range    B Natriuretic Peptide 6 0 - 100 pg/mL   Troponin STAT   Result Value Ref Range    Troponin I <0.01 0.00 - 0.04 ng/mL   Lipase   Result Value Ref Range    Lipase 54 11 - 82 U/L   PT/INR   Result Value Ref Range    PT 12.5 12.0 - 14.6 sec    INR 0.92 0.87 - 1.13   APTT   Result Value Ref Range    APTT 23.9 (L) 24.7 - 36.0 sec   ESTIMATED GFR   Result Value Ref Range    GFR If African American >60 >60 mL/min/1.73 m 2    GFR If Non African American >60 >60 mL/min/1.73 m 2   EKG (NOW)   Result Value Ref Range    Report       [facility] Emergency [facility]    Test Date:   2018  Pt Name:    CIPRIANO MACIEL                Department: ER  MRN:        [id number]                      Room:  Gender:     Female                       Technician: [id number]  :        1950                   Requested By:ER TRIAGE PROTOCOL  Order #:    [id number]                    Reading MD: YO BARR MD    Measurements  Intervals                                Axis  Rate:       65                           P:          41  WY:         204                          QRS:        48  QRSD:       94                           T:          45  QT:         420  QTc:        437    Interpretive Statements  SINUS RHYTHM  No previous ECG available for comparison    Electronically Signed On 2018 10:33:33 PST by YO BARR MD        All labs reviewed by me.      RADIOLOGY  DX-CHEST-PORTABLE (1 VIEW)   Final Result      No acute cardiopulmonary abnormality.        The radiologist's interpretation of all radiological studies have been reviewed by me.      COURSE & MEDICAL DECISION MAKING  Pertinent Labs & Imaging studies reviewed. (See chart for details)    Differential Diagnoses include but are not limited to: GERD, pneumonia, ACS, MI.    10:39 AM Patient seen and examined at bedside. Patient presents for chest pain.      Initial orders in the Emergency Department included EKG, XR chest and laboratory testing: CBC with differential, CMP, estimated GFR, BNP, troponin, lipase, PT/INR and APTT.     10:49 AM Obtained and reviewed EKG from clinic borderline Q waves in II, III and aVF; otherwise, unremarkable.       Decision Making:  This is a 68 y.o. female that presents with chest pain starting this morning at 2:00 AM.  Labs were reviewed: Troponin is not elevated.  Plan to admit to medicine for chest pain workup.      DISPOSITION  Patient will be admitted to Dr. Chaidez in stable condition.      DIAGNOSIS  1. Chest pain, unspecified type           I, Alejandrina Escalera (Scribe), am scribing for, and in the presence  of, Norberto Astudillo M.D.    Electronically signed by: Alejandrina Escalera (Scribe), 12/27/2018    I, Norberto Astuidllo M.D. personally performed the services described in this documentation, as scribed by Alejandrina Escalera in my presence, and it is both accurate and complete. C.     The note accurately reflects work and decisions made by me.  Norberto Astudlilo  12/27/2018  11:29 AM

## 2018-12-27 NOTE — DISCHARGE PLANNING
Care Transition Team Assessment    Spoke with patient at bedside. Anticipate no needs @ present. Spouse will be ride At D/C.    Information Source  Orientation : Oriented x 4  Information Given By: Patient    Readmission Evaluation  Is this a readmission?: No    Interdisciplinary Discharge Planning  Does Admitting Nurse Feel This Could be a Complex Discharge?: No  Primary Care Physician: Bridget  Lives with - Patient's Self Care Capacity: Spouse  Patient or legal guardian wants to designate a caregiver (see row info): No  Support Systems: Spouse / Significant Other  Do You Take your Prescribed Medications Regularly: Yes  Able to Return to Previous ADL's: Yes  Mobility Issues: No  Prior Services: None  Patient Expects to be Discharged to:: Home  Assistance Needed: No  Durable Medical Equipment: Not Applicable    Discharge Preparedness  What are your discharge supports?: Spouse  Prior Functional Level: Ambulatory  Difficulity with ADLs: None    Functional Assesment  Prior Functional Level: Ambulatory    Finances  Prescription Coverage: Yes    Anticipated Discharge Information  Anticipated discharge disposition: Home  Discharge Address: [address] [place] [address]  Discharge Contact Phone Number: [phone]

## 2018-12-27 NOTE — ED TRIAGE NOTES
Chief Complaint   Patient presents with   • Chest Pain     mid chest started this morning. describes pain as pressure and constant radiating to left arm and abdomen.   • Nausea     NAD. Able to speak in full sentences. Triage process explained. Instructed to notify staff for any worsening symptoms.

## 2018-12-27 NOTE — PROGRESS NOTES
Pt arrived to unit via WC at 1235. Pt oriented to room, unit, and plan of care. Tele-monitor placed. All questions answered at this time. Call light within reach, fall precautions in place, will continue to monitor. MD notified of pt arrival

## 2018-12-28 ENCOUNTER — PATIENT OUTREACH (OUTPATIENT)
Dept: HEALTH INFORMATION MANAGEMENT | Facility: OTHER | Age: 68
End: 2018-12-28

## 2018-12-28 ENCOUNTER — APPOINTMENT (OUTPATIENT)
Dept: RADIOLOGY | Facility: MEDICAL CENTER | Age: 68
End: 2018-12-28
Attending: INTERNAL MEDICINE
Payer: MEDICARE

## 2018-12-28 VITALS
DIASTOLIC BLOOD PRESSURE: 63 MMHG | HEIGHT: 65 IN | BODY MASS INDEX: 28.65 KG/M2 | TEMPERATURE: 98.5 F | SYSTOLIC BLOOD PRESSURE: 138 MMHG | HEART RATE: 91 BPM | OXYGEN SATURATION: 94 % | WEIGHT: 171.96 LBS | RESPIRATION RATE: 17 BRPM

## 2018-12-28 PROBLEM — R07.9 CHEST PAIN: Status: RESOLVED | Noted: 2018-12-27 | Resolved: 2018-12-28

## 2018-12-28 LAB
ALBUMIN SERPL BCP-MCNC: 3.8 G/DL (ref 3.2–4.9)
ALBUMIN/GLOB SERPL: 1.5 G/DL
ALP SERPL-CCNC: 46 U/L (ref 30–99)
ALT SERPL-CCNC: 22 U/L (ref 2–50)
ANION GAP SERPL CALC-SCNC: 8 MMOL/L (ref 0–11.9)
AST SERPL-CCNC: 12 U/L (ref 12–45)
BILIRUB SERPL-MCNC: 0.6 MG/DL (ref 0.1–1.5)
BUN SERPL-MCNC: 14 MG/DL (ref 8–22)
CALCIUM SERPL-MCNC: 8.9 MG/DL (ref 8.5–10.5)
CHLORIDE SERPL-SCNC: 107 MMOL/L (ref 96–112)
CO2 SERPL-SCNC: 21 MMOL/L (ref 20–33)
CREAT SERPL-MCNC: 0.65 MG/DL (ref 0.5–1.4)
ERYTHROCYTE [DISTWIDTH] IN BLOOD BY AUTOMATED COUNT: 41.8 FL (ref 35.9–50)
GLOBULIN SER CALC-MCNC: 2.5 G/DL (ref 1.9–3.5)
GLUCOSE SERPL-MCNC: 118 MG/DL (ref 65–99)
HCT VFR BLD AUTO: 38.4 % (ref 37–47)
HGB BLD-MCNC: 12.6 G/DL (ref 12–16)
MCH RBC QN AUTO: 29.5 PG (ref 27–33)
MCHC RBC AUTO-ENTMCNC: 32.8 G/DL (ref 33.6–35)
MCV RBC AUTO: 89.9 FL (ref 81.4–97.8)
PLATELET # BLD AUTO: 246 K/UL (ref 164–446)
PMV BLD AUTO: 9.7 FL (ref 9–12.9)
POTASSIUM SERPL-SCNC: 3.7 MMOL/L (ref 3.6–5.5)
PROT SERPL-MCNC: 6.3 G/DL (ref 6–8.2)
RBC # BLD AUTO: 4.27 M/UL (ref 4.2–5.4)
SODIUM SERPL-SCNC: 136 MMOL/L (ref 135–145)
WBC # BLD AUTO: 10.9 K/UL (ref 4.8–10.8)

## 2018-12-28 PROCEDURE — 700105 HCHG RX REV CODE 258: Performed by: INTERNAL MEDICINE

## 2018-12-28 PROCEDURE — 99217 PR OBSERVATION CARE DISCHARGE: CPT | Performed by: INTERNAL MEDICINE

## 2018-12-28 PROCEDURE — 700102 HCHG RX REV CODE 250 W/ 637 OVERRIDE(OP): Performed by: INTERNAL MEDICINE

## 2018-12-28 PROCEDURE — A9270 NON-COVERED ITEM OR SERVICE: HCPCS | Performed by: INTERNAL MEDICINE

## 2018-12-28 PROCEDURE — 80053 COMPREHEN METABOLIC PANEL: CPT

## 2018-12-28 PROCEDURE — G0378 HOSPITAL OBSERVATION PER HR: HCPCS

## 2018-12-28 PROCEDURE — 700111 HCHG RX REV CODE 636 W/ 250 OVERRIDE (IP)

## 2018-12-28 PROCEDURE — A9502 TC99M TETROFOSMIN: HCPCS

## 2018-12-28 PROCEDURE — 85027 COMPLETE CBC AUTOMATED: CPT

## 2018-12-28 RX ORDER — REGADENOSON 0.08 MG/ML
INJECTION, SOLUTION INTRAVENOUS
Status: COMPLETED
Start: 2018-12-28 | End: 2018-12-28

## 2018-12-28 RX ORDER — FAMOTIDINE 20 MG/1
20 TABLET, FILM COATED ORAL 2 TIMES DAILY
Qty: 60 TAB | Refills: 0 | Status: SHIPPED | OUTPATIENT
Start: 2018-12-28

## 2018-12-28 RX ORDER — BUTALBITAL, ACETAMINOPHEN, CAFFEINE AND CODEINE PHOSPHATE 50; 325; 40; 30 MG/1; MG/1; MG/1; MG/1
1 CAPSULE ORAL EVERY 4 HOURS PRN
Qty: 30 CAP | Refills: 0 | Status: SHIPPED | OUTPATIENT
Start: 2018-12-28 | End: 2019-01-02

## 2018-12-28 RX ADMIN — REGADENOSON 0.4 MG: 0.08 INJECTION, SOLUTION INTRAVENOUS at 08:11

## 2018-12-28 RX ADMIN — TERBINAFINE 250 MG: 250 TABLET ORAL at 05:23

## 2018-12-28 RX ADMIN — ASPIRIN 325 MG: 325 TABLET ORAL at 05:23

## 2018-12-28 RX ADMIN — SODIUM CHLORIDE: 9 INJECTION, SOLUTION INTRAVENOUS at 03:33

## 2018-12-28 ASSESSMENT — PAIN SCALES - GENERAL: PAINLEVEL_OUTOF10: 2

## 2018-12-28 NOTE — DISCHARGE SUMMARY
Discharge Summary    CHIEF COMPLAINT ON ADMISSION  Chief Complaint   Patient presents with   • Chest Pain     mid chest started this morning. describes pain as pressure and constant radiating to left arm and abdomen.   • Nausea       Reason for Admission  Chest Pain     Admission Date  12/27/2018    CODE STATUS  Full Code    HPI & HOSPITAL COURSE  This is a 68 y.o. female with a past medical history of GERD here with complaints of new onset chest pain and abdominal pain..  On admission EKG was stable.  Troponin remained negative.  Cardiac stress test was negative for reversible ischemia.  The patient's chest and abdominal pain completely resolved.  The patient does report a history of GERD and is currently on Prilosec as an outpatient.  She reports persistent nausea especially at nighttime and upon awakening over the last several months.  Her her symptoms appear to be associated with uncontrolled GERD.  Her home Prilosec dose was changed to twice daily.  She is recommended to follow-up with her outpatient gastroenterologist for further recommendations.  At this time the patient has no further chest pain and has maintained stable vital signs.  She currently has no further need for acute intervention and is safe to discharge home.       Therefore, she is discharged in good and stable condition to home with close outpatient follow-up.    Discharge Date  12/28/2018    FOLLOW UP ITEMS POST DISCHARGE  Patient will follow up with her outpatient gastroenterologist for further recommendations concerning her uncontrolled GERD.    DISCHARGE DIAGNOSES  Active Problems:    Gastroesophageal reflux disease without esophagitis POA: Yes    Leucocytosis POA: Yes    Hyperglycemia POA: Yes  Resolved Problems:    Chest pain POA: Yes      FOLLOW UP  Future Appointments  Date Time Provider Department Center   1/22/2019 1:40 PM ELLEN Gomez     No follow-up provider specified.    MEDICATIONS ON DISCHARGE      Medication List      CHANGE how you take these medications      Instructions   famotidine 20 MG Tabs  What changed:  when to take this  Commonly known as:  PEPCID   Take 1 Tab by mouth 2 times a day.  Dose:  20 mg        CONTINUE taking these medications      Instructions   Calcium Carbonate 600 MG Tabs   Take 1,200 mg by mouth every day.  Dose:  1200 mg     MOBIC 15 MG tablet  Generic drug:  meloxicam   Take 15 mg by mouth 1 time daily as needed.  Dose:  15 mg     terbinafine 250 MG Tabs  Commonly known as:  LAMISIL   Take 1 Tab by mouth every day.  Dose:  250 mg     triamcinolone acetonide 0.1 % Crea  Commonly known as:  KENALOG   Apply  to affected area(s) 2 times a day as needed.     Vitamin D3 5000 units Tabs   Take 5,000 Units by mouth every day.  Dose:  5000 Units            Allergies  Allergies   Allergen Reactions   • Pcn [Penicillins] Hives       DIET  Orders Placed This Encounter   Procedures   • Diet Order Regular     Standing Status:   Standing     Number of Occurrences:   1     Order Specific Question:   Diet:     Answer:   Regular [1]     Order Specific Question:   Miscellaneous modifications:     Answer:   No Decaf, No Caffeine(for test) [11]     Comments:   Protocol 1313 Patient to have no caffeine for 12 hours prior to exam (decaf, coffee, cola, tea, chocolate)       ACTIVITY  As tolerated.    LABORATORY  Lab Results   Component Value Date    SODIUM 136 12/28/2018    POTASSIUM 3.7 12/28/2018    CHLORIDE 107 12/28/2018    CO2 21 12/28/2018    GLUCOSE 118 (H) 12/28/2018    BUN 14 12/28/2018    CREATININE 0.65 12/28/2018        Lab Results   Component Value Date    WBC 10.9 (H) 12/28/2018    HEMOGLOBIN 12.6 12/28/2018    HEMATOCRIT 38.4 12/28/2018    PLATELETCT 246 12/28/2018

## 2018-12-28 NOTE — PROGRESS NOTES
Received report from Emilia RUIZ. Assume Pt care. Pt is sitting up on bed, eating dinner. Instructed to call for assistance. Will continue to monitor

## 2018-12-28 NOTE — PROGRESS NOTES
Gave report to Emilia RUIZ.    Shift/Tele Summary: S.Rhythm, HR 70-80's. Minimal headache and chest pressure. Otherwise, feeling better than initial admit. Stress test at 730

## 2018-12-28 NOTE — PROGRESS NOTES
Assumed patient care at 0700. Report received from Miquel RUIZ. Pt denies chest pain at this time, reports mild headache, denies need for intervention.     Pt to nuc med.

## 2018-12-28 NOTE — CARE PLAN
Problem: Pain Management  Goal: Pain level will decrease to patient's comfort goal  Outcome: NOT MET      Problem: Infection  Goal: Will remain free from infection  Outcome: PROGRESSING AS EXPECTED

## 2018-12-28 NOTE — DISCHARGE INSTRUCTIONS
Discharge Instructions    Discharged to home by car with relative. Discharged via wheelchair, hospital escort: Yes.  Special equipment needed: Not Applicable    Be sure to schedule a follow-up appointment with your primary care doctor or any specialists as instructed.     Discharge Plan:   Diet Plan: Discussed  Activity Level: Discussed  Confirmed Follow up Appointment: Appointment Scheduled  Confirmed Symptoms Management: Discussed  Medication Reconciliation Updated: Yes  Influenza Vaccine Indication: Not indicated: Previously immunized this influenza season and > 8 years of age    I understand that a diet low in cholesterol, fat, and sodium is recommended for good health. Unless I have been given specific instructions below for another diet, I accept this instruction as my diet prescription.   Other diet: Heart healthy    Special Instructions: None    · Is patient discharged on Warfarin / Coumadin?   No     Depression / Suicide Risk    As you are discharged from this [facility] facility, it is important to learn how to keep safe from harming yourself.    Recognize the warning signs:  · Abrupt changes in personality, positive or negative- including increase in energy   · Giving away possessions  · Change in eating patterns- significant weight changes-  positive or negative  · Change in sleeping patterns- unable to sleep or sleeping all the time   · Unwillingness or inability to communicate  · Depression  · Unusual sadness, discouragement and loneliness  · Talk of wanting to die  · Neglect of personal appearance   · Rebelliousness- reckless behavior  · Withdrawal from people/activities they love  · Confusion- inability to concentrate     If you or a loved one observes any of these behaviors or has concerns about self-harm, here's what you can do:  · Talk about it- your feelings and reasons for harming yourself  · Remove any means that you might use to hurt yourself (examples: pills, rope, extension cords,  firearm)  · Get professional help from the community (Mental Health, Substance Abuse, psychological counseling)  · Do not be alone:Call your Safe Contact- someone whom you trust who will be there for you.  · Call your local CRISIS HOTLINE 952-0635 or 576-640-6837  · Call your local Children's Mobile Crisis Response Team [place] (404) 851-8975 or www.[employer]  · Call the toll free National Suicide Prevention Hotlines   · National Suicide Prevention Lifeline 232-302-NISN (6495)  · National Hope Line Network 800-SUICIDE (287-5805)

## 2018-12-28 NOTE — PROGRESS NOTES
Seen pt, AOx 4. On cardiac monitor with SR. Chest pressure is less/better per pt, slight headache. Fluids on NS at 75 ml/hr. Plan of care discussed includes Safety, labs, cardiac monitoring, stress test (diet/med restrictions applied) and pt understands.

## 2018-12-28 NOTE — PROGRESS NOTES
CC: Chest pain    HPI:   Shannan presents today because she has been experiencing mid chest pain since this morning, it comes and goes, it stays about 5-10 minutes and goes away.  In the form of chest pressure, it radiates a little bit to the left side sometimes, the patient feels a little bit nauseous but denies any vomiting.  No exacerbating or alleviating factor.  She has no history of CAD, diabetes, or hypertension.  She has history of acid reflux has been responding very well to famotidine as needed.  Patient has been taking daily aspirin.      Patient Active Problem List    Diagnosis Date Noted   • Chest pain 12/27/2018   • Leucocytosis 12/27/2018   • Hyperglycemia 12/27/2018   • Varicose vein of leg 08/10/2018   • Gastroesophageal reflux disease without esophagitis 12/22/2017   • Thyroid mass 06/23/2017   • Vitamin D insufficiency 06/22/2017   • Right sided abdominal pain 06/22/2017   • OM (onychomycosis) 06/22/2017   • AK (actinic keratosis) 06/23/2016   • Chronic pain of right knee 06/23/2016   • Mixed hyperlipidemia 06/23/2016   • Right-sided thoracic back pain 06/23/2016   • Heart murmur    • Osteopenia of neck of left femur        No current facility-administered medications for this visit.      No current outpatient prescriptions on file.     Facility-Administered Medications Ordered in Other Visits   Medication Dose Route Frequency Provider Last Rate Last Dose   • terbinafine (LAMISIL) tablet 250 mg  250 mg Oral DAILY Pankaj Chaidez M.D.       • triamcinolone acetonide (KENALOG) 0.1 % cream   Topical BID PRN Pankaj Chaidez M.D.       • aspirin (ASA) tablet 325 mg  325 mg Oral DAILY Pankaj Chaidez M.D.   325 mg at 12/27/18 1354    Or   • aspirin (ASA) chewable tab 324 mg  324 mg Oral DAILY Pankaj Chaidez M.D.        Or   • aspirin (ASA) suppository 300 mg  300 mg Rectal DAILY Pankaj Chaidez M.D.       • senna-docusate (PERICOLACE or SENOKOT S) 8.6-50 MG per tablet 2 Tab  2 Tab Oral BID Pankaj Chaidez M.D.        And   •  "polyethylene glycol/lytes (MIRALAX) PACKET 1 Packet  1 Packet Oral QDAY PRN Pankaj Chaidez M.D.        And   • magnesium hydroxide (MILK OF MAGNESIA) suspension 30 mL  30 mL Oral QDAY PRN Pankaj Chaidez M.D.        And   • bisacodyl (DULCOLAX) suppository 10 mg  10 mg Rectal QDAY PRN Pankaj Chaidez M.D.       • NS infusion   Intravenous Continuous Pankaj Chaidez M.D. 75 mL/hr at 12/27/18 1354     • enoxaparin (LOVENOX) inj 40 mg  40 mg Subcutaneous DAILY Pankaj Chaidez M.D.   40 mg at 12/27/18 1358   • ketorolac (TORADOL) injection 15 mg  15 mg Intravenous Q6HRS PRN Pankaj Chaidez M.D.   15 mg at 12/27/18 1553         Allergies as of 12/27/2018 - Reviewed 12/27/2018   Allergen Reaction Noted   • Pcn [penicillins] Hives 06/09/2011        ROS: Denies any chest pain, Shortness of breath, Changes bowel or bladder, Lower extremity edema.    Physical Exam:  /78 (BP Location: Right arm, Patient Position: Sitting, BP Cuff Size: Adult)   Pulse 74   Temp 36.6 °C (97.9 °F) (Temporal)   Resp 16   Ht 1.651 m (5' 5\")   Wt 78 kg (172 lb)   SpO2 98%   BMI 28.62 kg/m²   Gen.: Well-developed, well-nourished, no apparent distress,pleasant and cooperative with the examination  Skin:  Warm and dry with good turgor. No rashes or suspicious lesions in visible areas  HEENT:Sinuses nontender with palpation, TMs clear, nares patent with pink mucosa and clear rhinorrhea,no septal deviation ,polyps or lesions. lips without lesions, oropharynx clear.  Neck: Trachea midline,no masses or adenopathy. No JVD.  Cor: Regular rate and rhythm without murmur, gallop or rub.  Lungs: Respirations unlabored.Clear to auscultation with equal breath sounds bilaterally. No wheezes, rhonchi.  Extremities: No cyanosis, clubbing or edema.        Assessment and Plan.   68 y.o. female     1. Chest pain, unspecified type  ACS cannot be excluded.  However unlikely  EKG was done in the clinic and read independently by me today.  The clinical symptoms is a little bit " worrisome but the EKG is not impressive.  So I recommend patient to be evaluated by blood test at the ER.    EKG:  Measurements   Intervals                                Axis   Rate:       78                          P:           22  VA:         208                         QRS:       24  QRSD:       96                       T:           18  QT:         420  QTc:       479    Interpretive Statements   SINUS RHYTHM   ? inferior Q waves, rule out inferior infarct  Consider anterior infarct, however it is unlikely.      - EKG - Clinic Performed

## 2019-01-28 ENCOUNTER — OFFICE VISIT (OUTPATIENT)
Dept: MEDICAL GROUP | Facility: PHYSICIAN GROUP | Age: 69
End: 2019-01-28
Payer: MEDICARE

## 2019-01-28 VITALS
TEMPERATURE: 98 F | SYSTOLIC BLOOD PRESSURE: 132 MMHG | HEART RATE: 71 BPM | DIASTOLIC BLOOD PRESSURE: 78 MMHG | BODY MASS INDEX: 26.99 KG/M2 | RESPIRATION RATE: 16 BRPM | WEIGHT: 162 LBS | HEIGHT: 65 IN | OXYGEN SATURATION: 96 %

## 2019-01-28 DIAGNOSIS — Z12.31 ENCOUNTER FOR SCREENING MAMMOGRAM FOR BREAST CANCER: ICD-10-CM

## 2019-01-28 DIAGNOSIS — Z09 HOSPITAL DISCHARGE FOLLOW-UP: ICD-10-CM

## 2019-01-28 DIAGNOSIS — K21.9 GASTROESOPHAGEAL REFLUX DISEASE WITHOUT ESOPHAGITIS: ICD-10-CM

## 2019-01-28 PROCEDURE — 99213 OFFICE O/P EST LOW 20 MIN: CPT | Mod: 25 | Performed by: NURSE PRACTITIONER

## 2019-01-28 PROCEDURE — 8041 PR SCP AHA: Performed by: NURSE PRACTITIONER

## 2019-01-28 ASSESSMENT — PATIENT HEALTH QUESTIONNAIRE - PHQ9: CLINICAL INTERPRETATION OF PHQ2 SCORE: 0

## 2019-01-28 NOTE — PROGRESS NOTES
CC:   Chief Complaint   Patient presents with   • Gastrophageal Reflux     hosp follow up        HISTORY OF THE PRESENT ILLNESS: Patient is a 68 y.o. female. This pleasant patient is here today for hospital follow up for chest pain on 12/27/2018.    Health Maintenance: Completed    Annual Health Assessment Questions:    1.  Are you currently engaging in any exercise or physical activity? No    2.  How would you describe your mood or emotional well-being today? good    3.  Have you had any falls in the last year? No    4.  Have you noticed any problems with your balance or had difficulty walking? No    5.  In the last six months have you experienced any leakage of urine? No    6. DPA/Advanced Directive: Patient has Advanced Directive on file.     Gastroesophageal reflux disease without esophagitis  This is a chronic problem for the patient that is currently controlled.  The patient was admitted to the hospital on 12/27/2018 for chest pain and abdominal pain.  The patient had a full cardiac workup at that time, all of which was negative.  The patient was discharged with a diagnosis of GERD and advised to take famotidine 20 mg twice daily and avoid triggers.  The patient notes that she has discovered her triggers include dark chocolate, corn, onions.  She has not been sleeping with her head elevated, but plans to.  She is no longer taking omeprazole, patient does believe famotidine 20 mg twice daily is effective.  The patient has lost 10 pounds since her hospital discharge.  The patient has not had a repeat of the chest pain and abdominal pain.    Allergies: Pcn [penicillins]    Current Outpatient Prescriptions Ordered in [facility]   Medication Sig Dispense Refill   • famotidine (PEPCID) 20 MG Tab Take 1 Tab by mouth 2 times a day. 60 Tab 0   • Calcium Carbonate 600 MG Tab Take 1,200 mg by mouth every day.     • terbinafine (LAMISIL) 250 MG Tab Take 1 Tab by mouth every day. 90 Tab 0   • Cholecalciferol (VITAMIN D3) 5000  units Tab Take 5,000 Units by mouth every day.     • meloxicam (MOBIC) 15 MG tablet Take 15 mg by mouth 1 time daily as needed.     • triamcinolone acetonide (KENALOG) 0.1 % Cream Apply  to affected area(s) 2 times a day as needed.       No current Epic-ordered facility-administered medications on file.        Past Medical History:   Diagnosis Date   • Gyn exam    • Heart murmur    • Hyperlipidemia    • Pap smear    • Post menopausal problems    • Varicose veins of both legs with edema    • Vitamin D deficiency        Past Surgical History:   Procedure Laterality Date   • TONSILLECTOMY         Social History   Substance Use Topics   • Smoking status: Never Smoker   • Smokeless tobacco: Never Used   • Alcohol use No       Social History     Social History Narrative   • No narrative on file       Family History   Problem Relation Age of Onset   • Heart Disease Mother         tachycardia, pacemaker   • Cancer Mother         colon ca - resected   • Psychiatry Father         suicide   • No Known Problems Brother    • Cancer Brother         Colon cancer   • Stroke Brother    • Alcohol/Drug Brother         marijuana   • Hypertension Brother    • Hypertension Maternal Aunt    • Alcohol/Drug Maternal Uncle    • Asthma Maternal Grandmother    • Heart Disease Maternal Grandmother    • Alcohol/Drug Maternal Grandfather    • Heart Disease Paternal Grandmother    • No Known Problems Paternal Grandfather        ROS:   Constitutional:  Negative for fever, chills, unexpected weight change, night sweats, body aches, sleep issues,  and fatigue/generalized weakness.   HEENT:  Negative for headaches, vision changes, hearing changes, ear pain, tinnitus, ear discharge, rhinorrhea, sinus congestion, sneezing, sore throat, and neck pain.    Respiratory:  Negative for cough, shortness of breath, sputum production, hemoptysis, chest congestion, dyspnea, wheezing, and crackles.    Cardiovascular:  Negative for chest pain, palpitations, NOVAK,  paroxsymal nocturnal dyspnea, orthopnea, and bilateral lower extremity edema.   Gastrointestinal: Positive for heartburn and nausea worse in the morning. Negative for vomiting, abdominal pain, hematochezia, melena, diarrhea, constipation, and greasy/foul-smelling stools.   Neurological:  Negative for dizziness, tingling, tremors, focal sensory deficit, focal weakness and headaches.   Psychiatric/Behavioral: Negative for depression, suicidal/homicidal ideation and memory loss.        Exam: Blood pressure 132/78, pulse 71, temperature 36.7 °C (98 °F), temperature source Temporal, weight 73.5 kg (162 lb), SpO2 96 %, not currently breastfeeding. Body mass index is 26.96 kg/m².    General:  Normal appearing. No distress.  Pulmonary:  Clear to ausculation.  Normal effort. No rales, ronchi, or wheezing.  Cardiovascular:  Regular rate and rhythm without murmur. Carotid and radial pulses are intact and equal bilaterally.  Abdomen:  Soft, nontender, nondistended. Normal bowel sounds.  Neurologic:  Grossly nonfocal.  Skin:  Warm and dry.  No obvious lesions.  Psych:  Normal mood and affect. Alert and oriented x3. Judgment and insight is normal.    Assessment/Plan  1.    2. Gastroesophageal reflux disease without esophagitis   Hospital discharge follow-up Continue famotidine 20 mg BID  Acid Reflux Precautions handout given to patient  Advised patient to elevate head of bed  Continue avoiding triggers   3. Encounter for screening mammogram for breast cancer  MA-SCREENING MAMMO BILAT W/TOMOSYNTHESIS W/CAD     Educated in proper administration of medication(s) ordered today including safety, possible SE, risks, benefits, rationale and alternatives to therapy.   Supportive care, differential diagnoses, and indications for immediate follow-up discussed with patient.    Pathogenesis of diagnosis discussed including typical length and natural progression.    Instructed to return to clinic or nearest emergency department for any change  in condition, further concerns, or worsening of symptoms.  Patient states understanding of the plan of care and discharge instructions.    Return in about 3 months (around 4/28/2019) for HC/PCP [facility]    Please note that this dictation was created using voice recognition software. I have made every reasonable attempt to correct obvious errors, but I expect that there are errors of grammar and possibly content that I did not discover before finalizing the note.

## 2019-01-28 NOTE — ASSESSMENT & PLAN NOTE
This is a chronic problem for the patient that is currently controlled.  The patient was admitted to the hospital on 12/27/2018 for chest pain and abdominal pain.  The patient had a full cardiac workup at that time, all of which was negative.  The patient was discharged with a diagnosis of GERD and advised to take famotidine 20 mg twice daily and avoid triggers.  The patient notes that she has discovered her triggers include dark chocolate, corn, onions.  She has not been sleeping with her head elevated, but plans to.  She is no longer taking omeprazole, patient does believe famotidine 20 mg twice daily is effective.  The patient has lost 10 pounds since her hospital discharge.  The patient has not had a repeat of the chest pain and abdominal pain.

## 2019-04-29 ENCOUNTER — TELEPHONE (OUTPATIENT)
Dept: MEDICAL GROUP | Facility: PHYSICIAN GROUP | Age: 69
End: 2019-04-29

## 2019-04-29 RX ORDER — MELOXICAM 15 MG/1
TABLET ORAL
Qty: 90 TAB | Refills: 0 | Status: SHIPPED | OUTPATIENT
Start: 2019-04-29 | End: 2019-12-02 | Stop reason: SDUPTHER

## 2019-04-29 NOTE — TELEPHONE ENCOUNTER
Future Appointments       Provider Department Center    4/30/2019 8:40 AM ELLEN Gomez; [facility]        ANNUAL WELLNESS VISIT PRE-VISIT PLANNING WITHOUT OUTREACH    1.  Reviewed note from last office visit with PCP: YES LOV 1/28/19    2.  If any orders were placed at last visit, do we have Results/Consult Notes?        •  Labs - Labs were not ordered at last office visit.       •  Imaging - Imaging ordered, NOT completed. Patient advised to complete prior to next appointment.       •  Referrals - No referrals were ordered at last office visit.    3.  Immunizations were updated in [facility] using WebIZ?: Yes       •  WebIZ Recommendations: TD, SHINGRIX (Shingles) and MMR        •  Is patient due for Tdap? NO - Due 2020       •  Is patient due for Shingrix? YES. Patient was notified of copay/out of pocket cost.     4.  Patient is due for the following Health Maintenance Topics:   Health Maintenance Due   Topic Date Due   • Annual Wellness Visit  04/07/2019       5.  Reviewed/Updated the following with patient:       •   Preferred Pharmacy? YES       •   Preferred Lab? YES       •   Preferred Communication? YES - Home phone        •   Allergies? YES       •   Medications? YES. Was Abstract Encounter opened and chart updated? YES       •   Social History? YES. Was Abstract Encounter opened and chart updated? YES       •   Family History (document living status of immediate family members and if + hx of  cancer, diabetes, hypertension, hyperlipidemia, heart attack, stroke) NO    6.  Care Team Updated:       •   DME Company (gait device, O2, CPAP, etc.): N\A       •   Other Specialists (eye doctor, derm, GYN, cardiology, endo, etc): YES    7. Orders for overdue Health Maintenance topics pended in Pre-Charting? N\A    8.  Patient has the following Care Path diagnoses on Problem List:  NONE    9.  Patient was advised: “This is a free wellness visit. The provider will  screen for medical conditions to help you stay healthy. If you have other concerns to address you may be asked to discuss these at a separate visit or there may be an additional fee.”     10.  Patient was informed to arrive 15 min prior to their scheduled appointment and bring in their medication bottles.    EARLENE 4/29/19 @ 8:35AM

## 2019-04-29 NOTE — TELEPHONE ENCOUNTER
Requested Prescriptions     Pending Prescriptions Disp Refills   • meloxicam (MOBIC) 15 MG tablet [Pharmacy Med Name: MELOXICAM 15MG TABLETS] 90 Tab 0     Sig: TAKE 1 TABLET BY MOUTH EVERY DAY       MAYCOL Gomez.

## 2019-04-30 ENCOUNTER — OFFICE VISIT (OUTPATIENT)
Dept: MEDICAL GROUP | Facility: PHYSICIAN GROUP | Age: 69
End: 2019-04-30
Payer: MEDICARE

## 2019-04-30 VITALS
TEMPERATURE: 98.3 F | WEIGHT: 164 LBS | HEIGHT: 65 IN | HEART RATE: 73 BPM | DIASTOLIC BLOOD PRESSURE: 68 MMHG | SYSTOLIC BLOOD PRESSURE: 120 MMHG | OXYGEN SATURATION: 95 % | BODY MASS INDEX: 27.32 KG/M2

## 2019-04-30 DIAGNOSIS — G89.29 CHRONIC PAIN OF RIGHT KNEE: ICD-10-CM

## 2019-04-30 DIAGNOSIS — M54.6 CHRONIC RIGHT-SIDED THORACIC BACK PAIN: ICD-10-CM

## 2019-04-30 DIAGNOSIS — G89.29 CHRONIC RIGHT-SIDED THORACIC BACK PAIN: ICD-10-CM

## 2019-04-30 DIAGNOSIS — E07.9 THYROID MASS: ICD-10-CM

## 2019-04-30 DIAGNOSIS — I83.93 VARICOSE VEINS OF BOTH LOWER EXTREMITIES, UNSPECIFIED WHETHER COMPLICATED: ICD-10-CM

## 2019-04-30 DIAGNOSIS — Z78.0 POSTMENOPAUSAL: ICD-10-CM

## 2019-04-30 DIAGNOSIS — M85.852 OSTEOPENIA OF NECK OF LEFT FEMUR: ICD-10-CM

## 2019-04-30 DIAGNOSIS — Z00.00 MEDICARE ANNUAL WELLNESS VISIT, SUBSEQUENT: ICD-10-CM

## 2019-04-30 DIAGNOSIS — E78.2 MIXED HYPERLIPIDEMIA: ICD-10-CM

## 2019-04-30 DIAGNOSIS — K21.9 GASTROESOPHAGEAL REFLUX DISEASE WITHOUT ESOPHAGITIS: ICD-10-CM

## 2019-04-30 DIAGNOSIS — L57.0 AK (ACTINIC KERATOSIS): ICD-10-CM

## 2019-04-30 DIAGNOSIS — R73.9 HYPERGLYCEMIA: ICD-10-CM

## 2019-04-30 DIAGNOSIS — M25.561 CHRONIC PAIN OF RIGHT KNEE: ICD-10-CM

## 2019-04-30 DIAGNOSIS — Z23 NEED FOR VACCINATION: ICD-10-CM

## 2019-04-30 DIAGNOSIS — E55.9 VITAMIN D INSUFFICIENCY: ICD-10-CM

## 2019-04-30 DIAGNOSIS — B35.1 OM (ONYCHOMYCOSIS): ICD-10-CM

## 2019-04-30 DIAGNOSIS — R01.1 HEART MURMUR: ICD-10-CM

## 2019-04-30 PROBLEM — D72.829 LEUCOCYTOSIS: Status: RESOLVED | Noted: 2018-12-27 | Resolved: 2019-04-30

## 2019-04-30 PROBLEM — R10.9 RIGHT SIDED ABDOMINAL PAIN: Status: RESOLVED | Noted: 2017-06-22 | Resolved: 2019-04-30

## 2019-04-30 PROCEDURE — G0439 PPPS, SUBSEQ VISIT: HCPCS | Performed by: NURSE PRACTITIONER

## 2019-04-30 RX ORDER — TRIAMCINOLONE ACETONIDE 1 MG/G
1 CREAM TOPICAL 2 TIMES DAILY PRN
Qty: 1 TUBE | Refills: 11 | Status: SHIPPED | OUTPATIENT
Start: 2019-04-30

## 2019-04-30 ASSESSMENT — PATIENT HEALTH QUESTIONNAIRE - PHQ9: CLINICAL INTERPRETATION OF PHQ2 SCORE: 0

## 2019-04-30 ASSESSMENT — ENCOUNTER SYMPTOMS: GENERAL WELL-BEING: GOOD

## 2019-04-30 ASSESSMENT — ACTIVITIES OF DAILY LIVING (ADL): BATHING_REQUIRES_ASSISTANCE: 0

## 2019-04-30 NOTE — PROGRESS NOTES
"Chief Complaint   Patient presents with   • Annual Wellness Visit     HPI:  Shannan is a 68 y.o. here for Medicare Annual Wellness Visit    AK (actinic keratosis)  This is a chronic problem for the patient that is stable.  The patient is being seen by dermatology for this issue and has her annual check up scheduled for August 2019. The patient continues to use kenalog cream topical as needed for lesions.    Chronic pain of right knee  This is a chronic problem for the patient that is stable.  The patient states that the initial injury occured in June 2016 after she strained her right knee while gardening.  She continues to use meloxicam 15 mg to be taken daily as needed, patient states that she rarely needs to use this medication as her knee does not bother her too much.  She does note that when she is more active, for example when she is on vacation or gardening, she will need to take the medication more, however it is not as bothersome when she is not as active.    Varicose vein of leg  This is a chronic problem for the patient that is ongoing.  The patient is being followed by [facility] for this issue.  The patient was experiencing bilateral lower leg heaviness worse at night.  She had bilateral lower extremity radiofrequency ablation in February 2019.  Patient states that her right leg has a \"cord like feeling\" that she was told should resolve.  Patient states that her left leg did not take and it will need to be repeated.  The patient has a 4-month follow-up in June 2019, and we will plan the repeat procedure for her left leg at that time.  The patient reports that she wears thigh-high compression stockings when flying and occasionally at home.    Gastroesophageal reflux disease without esophagitis  This is a chronic problem for the patient that is currently controlled.  The patient continues to take Pepcid OTC 20 mg BID. She also avoids her known triggers, which include dark chocolate, corn, onions.  The " "patient also notes that if she forgets to take her evening Pepcid, she will experience left upper arm aching and morning sickness without emesis, which are quickly resolved by taking her morning dose of Pepcid.    Heart murmur  This is a chronic problem for the patient that was noted on an echo in 2008 that reported \"mild mitral regurgitation and mild tricuspid regurgitation\".  The patient reports that this is a stable problem that is not followed by cardiology.  The patient reports that she is only symptomatic occasionally when she raises both of her arms above her head which causes her to get slightly dizzy, but she has never passed out. Patient also states that she does not need to take antibiotics prior to dental procedures.    Hyperglycemia  This is a chronic problem for the patient noted on lab work over the years. The patient states that her vice is chocolate.     Mixed hyperlipidemia  This is a chronic problem for the patient that is ongoing. The patients last lipid profile was completed on 12/04/2018. The patient has since started taking an over the counter omega 3 supplement with EPA and DHA. She is also continuing to work on healthy diet and exercise.   Lab Results  Component Value Date/Time   CHOLSTRLTOT 202 (H) 12/04/2018 0640   TRIGLYCERIDE 154 (H) 12/04/2018 0640   HDL 45 12/04/2018 0640    (H) 12/04/2018 0640       OM (onychomycosis)  This is a chronic problem for the patient that is ongoing. The patient has continued to take terbinafine to help with her toenail fungus, she has stopped taking the medication at the beginning of April and plans to take a 3 month break from the medication. She is requesting orders for updated labs after her 3 month break so she may resume the medication.    Osteopenia of neck of left femur  This is a chronic problem for the patient that is ongoing. The patients last bone density scan was completed on 7/7/2016 which revealed osteopenia. The patient continues to " "take over-the-counter calcium and vitamin D.  She denies any falls. Will plan to repeat a bone density scan.    Right sided abdominal pain  This problem has resolved, was related to acid reflux.    Right-sided thoracic back pain  This is a chronic problem for the patient that is stable.  The patient reports that her right-sided low back pain is more \"annoying than painful\", but has lessened in frequency. The patient continues to take meloxicam as needed, but it is rare.The pain is worse with increased activity levels.  She denies any numbness or tingling.    Thyroid mass  This is a chronic problem for the patient that is stable. The patient has had soft tissue ultrasounds of her neck on 6/23/2017, 12/26/2017, 8/14/2018.  Most recently completed in August 2018 which showed that the mass had not changed in size and no further intervention was recommended at that time.  The patient denies any difficulty swallowing, fatigue, cold/heat intolerance, constipation, weight gain, skin changes, hair changes, hair loss, brittle nails, or edema.    Vitamin D insufficiency  This is a chronic problem for the patient that is uncontrolled.  The patient's last vitamin D level was 25 on 8/4/2017.  The patient continues to take over-the-counter vitamin D and calcium supplements, calcium 1200 mg/day and vitamin D 5800 units/day.    Current Outpatient Prescriptions   Medication Sig Dispense Refill   • triamcinolone acetonide (KENALOG) 0.1 % Cream Apply 1 Application to affected area(s) 2 times a day as needed. 1 Tube 11   • Zoster Vac Recomb Adjuvanted (SHINGRIX) 50 MCG/0.5ML Recon Susp 0.5 mL by Intramuscular route Once for 1 dose. 0.5 mL 0   • meloxicam (MOBIC) 15 MG tablet TAKE 1 TABLET BY MOUTH EVERY DAY 90 Tab 0   • Omega-3 Fatty Acids (OMEGA 3 500 PO) Take  by mouth.     • famotidine (PEPCID) 20 MG Tab Take 1 Tab by mouth 2 times a day. 60 Tab 0   • Calcium Carbonate 600 MG Tab Take 1,200 mg by mouth every day.     • terbinafine " (LAMISIL) 250 MG Tab Take 1 Tab by mouth every day. 90 Tab 0   • Cholecalciferol (VITAMIN D3) 5000 units Tab Take 5,000 Units by mouth every day.       No current facility-administered medications for this visit.       Patient is taking medications as noted in medication list.  Current supplements as per medication list.     Allergies: Pcn [penicillins]    Current social contact/activities:  Goes to [email or web address], has dinner with friends and kids, goes to [employer] to see friends.     Is patient current with immunizations? Yes.    She  reports that she has never smoked. She has never used smokeless tobacco. She reports that she does not drink alcohol or use drugs.  Counseling given: Yes        DPA/Advanced directive: Patient has Advanced Directive on file.     ROS:    Gait: Uses no assistive device   Ostomy: No   Other tubes: No   Amputations: No   Chronic oxygen use No   Last eye exam  March 2019  Wears hearing aids: No   : Denies any urinary leakage during the last 6 months    Depression Screening    Little interest or pleasure in doing things?  0 - not at all  Feeling down, depressed, or hopeless? 0 - not at all  Patient Health Questionnaire Score: 0    If depressive symptoms identified deferred to follow up visit unless specifically addressed in assessment and plan.    Interpretation of PHQ-9 Total Score   Score Severity   1-4 No Depression   5-9 Mild Depression   10-14 Moderate Depression   15-19 Moderately Severe Depression   20-27 Severe Depression    Screening for Cognitive Impairment    Three Minute Recall (village, kitchen, baby)  3/3 Village kitchen baby  Oj clock face with all 12 numbers and set the hands to show 10 past 10.  Yes 10:10  4/5  If cognitive concerns identified, deferred for follow up unless specifically addressed in assessment and plan.    Fall Risk Assessment    Has the patient had two or more falls in the last year or any fall with injury in the last year?  No  If fall risk identified, deferred  for follow up unless specifically addressed in assessment and plan.    Safety Assessment    Throw rugs on floor.  No  Handrails on all stairs.  Yes  Good lighting in all hallways.  Yes  Difficulty hearing.  No  Patient counseled about all safety risks that were identified.    Functional Assessment ADLs    Are there any barriers preventing you from cooking for yourself or meeting nutritional needs?  No.    Are there any barriers preventing you from driving safely or obtaining transportation?  No.    Are there any barriers preventing you from using a telephone or calling for help?  No.    Are there any barriers preventing you from shopping?  No.    Are there any barriers preventing you from taking care of your own finances?  No.    Are there any barriers preventing you from managing your medications?  No.    Are there any barriers preventing you from showering, bathing or dressing yourself?  No.    Are you currently engaging in any exercise or physical activity?  Yes.  Clean house, play with grandchildren,   What is your perception of your health?  Good.    Health Maintenance Summary                BONE DENSITY Overdue 7/7/2018      Done 7/7/2016 DS-BONE DENSITY STUDY (DEXA)     Patient has more history with this topic...    IMM ZOSTER VACCINES Postponed 7/28/2019 Originally 12/16/2011. System: vaccine not available, other system reasons     Done 10/21/2011 Imm Admin: Zoster Vaccine Live (ZVL) (Zostavax)    MAMMOGRAM Next Due 8/14/2019      Done 8/14/2018 MA-MAMMO SCREENING BILAT W/TOMOSYNTHESIS W/CAD     Patient has more history with this topic...    Annual Wellness Visit Next Due 4/30/2020      Done 4/30/2019 Visit Dx: Medicare annual wellness visit, subsequent     Patient has more history with this topic...    IMM DTaP/Tdap/Td Vaccine Next Due 8/11/2020      Done 8/11/2010 Imm Admin: Tdap Vaccine    COLONOSCOPY Next Due 8/3/2021      Done 8/3/2016 REFERRAL TO GI FOR COLONOSCOPY          Patient Care Team:  Joyce  "ELLEN Rangel as PCP - General (Family Medicine)  Becky Mcconnell [facility] as Medical Home Care Manager (Dentistry)  Markie Dumont OD as Medical Home Care Manager (Optometry)  DAI Sanches as Consulting Physician (Family Medicine)  Amy R. Schoening, P.A. as Consulting Physician (Family Medicine)  YESICA Franklin as Consulting Physician (Family Medicine)  Ruben VALLADARES D.O. as Consulting Physician (Radiology)    Social History   Substance Use Topics   • Smoking status: Never Smoker   • Smokeless tobacco: Never Used   • Alcohol use No     Family History   Problem Relation Age of Onset   • Heart Disease Mother         tachycardia, pacemaker   • Cancer Mother         colon ca - resected   • Psychiatry Father         suicide   • No Known Problems Brother    • Cancer Brother         Colon cancer   • Stroke Brother    • Alcohol/Drug Brother         marijuana   • Hypertension Brother    • Hypertension Maternal Aunt    • Alcohol/Drug Maternal Uncle    • Asthma Maternal Grandmother    • Heart Disease Maternal Grandmother    • Alcohol/Drug Maternal Grandfather    • Heart Disease Paternal Grandmother    • No Known Problems Paternal Grandfather      She  has a past medical history of Gyn exam; Heart murmur; Hyperlipidemia; Leucocytosis (12/27/2018); Pap smear; Post menopausal problems; Varicose veins of both legs with edema; and Vitamin D deficiency.   Past Surgical History:   Procedure Laterality Date   • TONSILLECTOMY       Exam:     /68 (BP Location: Left arm, Patient Position: Sitting, BP Cuff Size: Adult)   Pulse 73   Temp 36.8 °C (98.3 °F) (Temporal)   Ht 1.651 m (5' 5\")   Wt 74.4 kg (164 lb)   SpO2 95%  Body mass index is 27.29 kg/m².    Hearing good.    Dentition good  Alert, oriented in no acute distress.  Eye contact is good, speech goal directed, affect calm    Assessment and Plan. The following treatment and monitoring plan is recommended:    1. Medicare annual " wellness visit, subsequent  Subsequent Annual Wellness Visit - Includes PPPS ()   2. AK (actinic keratosis)  This is a chronic problem that is stable.  Continue triamcinolone acetonide (KENALOG) 0.1 % Cream   3. Chronic pain of right knee  This is a chronic problem that is stable.  Continue meloxicam as needed   4. Varicose veins of both lower extremities, unspecified whether complicated  This is a chronic problem that is ongoing and managed by [facility].  Continue follow up in June 2019   5. Gastroesophageal reflux disease without esophagitis  This is a chronic problem that is stable.  Continue OTC pepcid 20 mg BID   6. Heart murmur  This is a chronic problem that is stable.  Continue to monitor.   7. Hyperglycemia  This is a chronic problem that is ongoing.  Comp Metabolic Panel    HEMOGLOBIN A1C   8. Mixed hyperlipidemia  This is a chronic problem that is ongoing.  Continue healthy diet and exercise.  Comp Metabolic Panel    Lipid Profile   9. OM (onychomycosis)  This is a chronic problem that is ongoing.  Comp Metabolic Panel - July 2019  Will refill terbinafine in July 2019 if liver enzymes WNL after a 3 month holiday from the medication   10. Osteopenia of neck of left femur  DS-BONE DENSITY STUDY (DEXA)   11. Postmenopausal  DS-BONE DENSITY STUDY (DEXA)   12. Chronic right-sided thoracic back pain  This is a chronic problem that is stable.  Continue meloxicam as needed   13. Thyroid mass  This is a chronic problem that is stable.  Continue to monitor   14. Vitamin D insufficiency  VITAMIN D,25 HYDROXY   15. Need for vaccination  Zoster Vac Recomb Adjuvanted (SHINGRIX) 50 MCG/0.5ML Recon Susp  Patient provided with paper prescription for Shingrix vaccine.  Advised patient to take prescription to pharmacy to check for availability and/or to be put on a waiting list.  Advised the patient that Shingrix is a series of two consecutive vaccines.     Services suggested: No services needed at this time  Health  Care Screening recommendations as per orders if indicated.  Referrals offered: PT/OT/Nutrition counseling/Behavioral Health/Smoking cessation as per orders if indicated.    Discussion today about general wellness and lifestyle habits:    · Prevent falls and reduce trip hazards; Cautioned about securing or removing rugs.  · Have a working fire alarm and carbon monoxide detector;   · Engage in regular physical activity and social activities       Follow-up: Return in about 1 year (around 4/30/2020) for HC/PCP Annual Wellness Visit- [facility].

## 2019-04-30 NOTE — ASSESSMENT & PLAN NOTE
This is a chronic problem for the patient that is stable. The patient has had soft tissue ultrasounds of her neck on 6/23/2017, 12/26/2017, 8/14/2018.  Most recently completed in August 2018 which showed that the mass had not changed in size and no further intervention was recommended at that time.  The patient denies any difficulty swallowing, fatigue, cold/heat intolerance, constipation, weight gain, skin changes, hair changes, hair loss, brittle nails, or edema.

## 2019-04-30 NOTE — ASSESSMENT & PLAN NOTE
This is a chronic problem for the patient that is stable.  The patient states that the initial injury occured in June 2016 after she strained her right knee while gardening.  She continues to use meloxicam 15 mg to be taken daily as needed, patient states that she rarely needs to use this medication as her knee does not bother her too much.  She does note that when she is more active, for example when she is on vacation or gardening, she will need to take the medication more, however it is not as bothersome when she is not as active.

## 2019-04-30 NOTE — ASSESSMENT & PLAN NOTE
"This is a chronic problem for the patient that was noted on an echo in 2008 that reported \"mild mitral regurgitation and mild tricuspid regurgitation\".  The patient reports that this is a stable problem that is not followed by cardiology.  The patient reports that she is only symptomatic occasionally when she raises both of her arms above her head which causes her to get slightly dizzy, but she has never passed out. Patient also states that she does not need to take antibiotics prior to dental procedures.  "

## 2019-04-30 NOTE — ASSESSMENT & PLAN NOTE
This is a chronic problem for the patient that is ongoing. The patients last lipid profile was completed on 12/04/2018. The patient has since started taking an over the counter omega 3 supplement with EPA and DHA. She is also continuing to work on healthy diet and exercise.   Lab Results  Component Value Date/Time   CHOLSTRLTOT 202 (H) 12/04/2018 0640   TRIGLYCERIDE 154 (H) 12/04/2018 0640   HDL 45 12/04/2018 0640    (H) 12/04/2018 0640

## 2019-04-30 NOTE — ASSESSMENT & PLAN NOTE
This is a chronic problem for the patient that is currently controlled.  The patient continues to take Pepcid OTC 20 mg BID. She also avoids her known triggers, which include dark chocolate, corn, onions.  The patient also notes that if she forgets to take her evening Pepcid, she will experience left upper arm aching and morning sickness without emesis, which are quickly resolved by taking her morning dose of Pepcid.

## 2019-04-30 NOTE — ASSESSMENT & PLAN NOTE
This is a chronic problem for the patient noted on lab work over the years. The patient states that her vice is chocolate.

## 2019-04-30 NOTE — ASSESSMENT & PLAN NOTE
This is a chronic problem for the patient that is ongoing. The patient has continued to take terbinafine to help with her toenail fungus, she has stopped taking the medication at the beginning of April and plans to take a 3 month break from the medication. She is requesting orders for updated labs after her 3 month break so she may resume the medication.

## 2019-04-30 NOTE — ASSESSMENT & PLAN NOTE
"This is a chronic problem for the patient that is ongoing.  The patient is being followed by [facility] for this issue.  The patient was experiencing bilateral lower leg heaviness worse at night.  She had bilateral lower extremity radiofrequency ablation in February 2019.  Patient states that her right leg has a \"cord like feeling\" that she was told should resolve.  Patient states that her left leg did not take and it will need to be repeated.  The patient has a 4-month follow-up in June 2019, and we will plan the repeat procedure for her left leg at that time.  The patient reports that she wears thigh-high compression stockings when flying and occasionally at home.  "

## 2019-04-30 NOTE — ASSESSMENT & PLAN NOTE
This is a chronic problem for the patient that is ongoing. The patients last bone density scan was completed on 7/7/2016 which revealed osteopenia. The patient continues to take over-the-counter calcium and vitamin D.  She denies any falls. Will plan to repeat a bone density scan.

## 2019-04-30 NOTE — ASSESSMENT & PLAN NOTE
"This is a chronic problem for the patient that is stable.  The patient reports that her right-sided low back pain is more \"annoying than painful\", but has lessened in frequency. The patient continues to take meloxicam as needed, but it is rare.The pain is worse with increased activity levels.  She denies any numbness or tingling.  "

## 2019-04-30 NOTE — ASSESSMENT & PLAN NOTE
This is a chronic problem for the patient that is stable.  The patient is being seen by dermatology for this issue and has her annual check up scheduled for August 2019. The patient continues to use kenalog cream topical as needed for lesions.

## 2019-04-30 NOTE — ASSESSMENT & PLAN NOTE
This is a chronic problem for the patient that is uncontrolled.  The patient's last vitamin D level was 25 on 8/4/2017.  The patient continues to take over-the-counter vitamin D and calcium supplements, calcium 1200 mg/day and vitamin D 5800 units/day.

## 2019-05-09 ENCOUNTER — OFFICE VISIT (OUTPATIENT)
Dept: URGENT CARE | Facility: PHYSICIAN GROUP | Age: 69
End: 2019-05-09
Payer: MEDICARE

## 2019-05-09 ENCOUNTER — APPOINTMENT (OUTPATIENT)
Dept: RADIOLOGY | Facility: MEDICAL CENTER | Age: 69
End: 2019-05-09
Attending: NURSE PRACTITIONER
Payer: MEDICARE

## 2019-05-09 VITALS
HEIGHT: 65 IN | WEIGHT: 164 LBS | SYSTOLIC BLOOD PRESSURE: 138 MMHG | TEMPERATURE: 98.6 F | DIASTOLIC BLOOD PRESSURE: 86 MMHG | HEART RATE: 89 BPM | OXYGEN SATURATION: 96 % | BODY MASS INDEX: 27.32 KG/M2

## 2019-05-09 DIAGNOSIS — S00.33XA CONTUSION OF NOSE, INITIAL ENCOUNTER: ICD-10-CM

## 2019-05-09 PROCEDURE — 99213 OFFICE O/P EST LOW 20 MIN: CPT | Performed by: FAMILY MEDICINE
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
## 2019-05-17 ENCOUNTER — HOSPITAL ENCOUNTER (OUTPATIENT)
Dept: RADIOLOGY | Facility: MEDICAL CENTER | Age: 69
End: 2019-05-17
Attending: NURSE PRACTITIONER
Payer: MEDICARE

## 2019-05-17 DIAGNOSIS — Z78.0 POSTMENOPAUSAL: ICD-10-CM

## 2019-05-17 DIAGNOSIS — M85.852 OSTEOPENIA OF NECK OF LEFT FEMUR: ICD-10-CM

## 2019-05-17 PROCEDURE — 77080 DXA BONE DENSITY AXIAL: CPT

## 2019-07-11 ENCOUNTER — HOSPITAL ENCOUNTER (OUTPATIENT)
Dept: LAB | Facility: MEDICAL CENTER | Age: 69
End: 2019-07-11
Attending: NURSE PRACTITIONER
Payer: MEDICARE

## 2019-07-11 DIAGNOSIS — E55.9 VITAMIN D INSUFFICIENCY: ICD-10-CM

## 2019-07-11 DIAGNOSIS — E78.2 MIXED HYPERLIPIDEMIA: ICD-10-CM

## 2019-07-11 DIAGNOSIS — R73.9 HYPERGLYCEMIA: ICD-10-CM

## 2019-07-11 DIAGNOSIS — B35.1 OM (ONYCHOMYCOSIS): ICD-10-CM

## 2019-07-11 LAB
25(OH)D3 SERPL-MCNC: 58 NG/ML (ref 30–100)
ALBUMIN SERPL BCP-MCNC: 4.4 G/DL (ref 3.2–4.9)
ALBUMIN/GLOB SERPL: 1.6 G/DL
ALP SERPL-CCNC: 42 U/L (ref 30–99)
ALT SERPL-CCNC: 18 U/L (ref 2–50)
ANION GAP SERPL CALC-SCNC: 7 MMOL/L (ref 0–11.9)
AST SERPL-CCNC: 18 U/L (ref 12–45)
BILIRUB SERPL-MCNC: 0.8 MG/DL (ref 0.1–1.5)
BUN SERPL-MCNC: 30 MG/DL (ref 8–22)
CALCIUM SERPL-MCNC: 9.8 MG/DL (ref 8.5–10.5)
CHLORIDE SERPL-SCNC: 105 MMOL/L (ref 96–112)
CHOLEST SERPL-MCNC: 246 MG/DL (ref 100–199)
CO2 SERPL-SCNC: 27 MMOL/L (ref 20–33)
CREAT SERPL-MCNC: 0.64 MG/DL (ref 0.5–1.4)
EST. AVERAGE GLUCOSE BLD GHB EST-MCNC: 108 MG/DL
FASTING STATUS PATIENT QL REPORTED: NORMAL
GLOBULIN SER CALC-MCNC: 2.8 G/DL (ref 1.9–3.5)
GLUCOSE SERPL-MCNC: 99 MG/DL (ref 65–99)
HBA1C MFR BLD: 5.4 % (ref 0–5.6)
HDLC SERPL-MCNC: 50 MG/DL
LDLC SERPL CALC-MCNC: 171 MG/DL
POTASSIUM SERPL-SCNC: 3.8 MMOL/L (ref 3.6–5.5)
PROT SERPL-MCNC: 7.2 G/DL (ref 6–8.2)
SODIUM SERPL-SCNC: 139 MMOL/L (ref 135–145)
TRIGL SERPL-MCNC: 127 MG/DL (ref 0–149)

## 2019-07-11 PROCEDURE — 80053 COMPREHEN METABOLIC PANEL: CPT

## 2019-07-11 PROCEDURE — 82306 VITAMIN D 25 HYDROXY: CPT

## 2019-07-11 PROCEDURE — 80061 LIPID PANEL: CPT

## 2019-07-11 PROCEDURE — 36415 COLL VENOUS BLD VENIPUNCTURE: CPT

## 2019-07-11 PROCEDURE — 83036 HEMOGLOBIN GLYCOSYLATED A1C: CPT

## 2019-08-16 ENCOUNTER — APPOINTMENT (RX ONLY)
Dept: URBAN - METROPOLITAN AREA CLINIC 31 | Facility: CLINIC | Age: 69
Setting detail: DERMATOLOGY
End: 2019-08-16

## 2019-08-16 ENCOUNTER — HOSPITAL ENCOUNTER (OUTPATIENT)
Dept: RADIOLOGY | Facility: MEDICAL CENTER | Age: 69
End: 2019-08-16
Attending: NURSE PRACTITIONER
Payer: MEDICARE

## 2019-08-16 DIAGNOSIS — D18.0 HEMANGIOMA: ICD-10-CM

## 2019-08-16 DIAGNOSIS — Z71.89 OTHER SPECIFIED COUNSELING: ICD-10-CM

## 2019-08-16 DIAGNOSIS — L82.1 OTHER SEBORRHEIC KERATOSIS: ICD-10-CM

## 2019-08-16 DIAGNOSIS — D22 MELANOCYTIC NEVI: ICD-10-CM

## 2019-08-16 DIAGNOSIS — L81.4 OTHER MELANIN HYPERPIGMENTATION: ICD-10-CM

## 2019-08-16 DIAGNOSIS — Z12.31 ENCOUNTER FOR SCREENING MAMMOGRAM FOR BREAST CANCER: ICD-10-CM

## 2019-08-16 PROBLEM — D22.5 MELANOCYTIC NEVI OF TRUNK: Status: ACTIVE | Noted: 2019-08-16

## 2019-08-16 PROBLEM — D18.01 HEMANGIOMA OF SKIN AND SUBCUTANEOUS TISSUE: Status: ACTIVE | Noted: 2019-08-16

## 2019-08-16 PROCEDURE — 77063 BREAST TOMOSYNTHESIS BI: CPT

## 2019-08-16 PROCEDURE — 99213 OFFICE O/P EST LOW 20 MIN: CPT

## 2019-08-16 PROCEDURE — ? COUNSELING

## 2019-08-16 ASSESSMENT — LOCATION DETAILED DESCRIPTION DERM
LOCATION DETAILED: EPIGASTRIC SKIN
LOCATION DETAILED: LEFT MEDIAL UPPER BACK
LOCATION DETAILED: LEFT RIB CAGE

## 2019-08-16 ASSESSMENT — LOCATION SIMPLE DESCRIPTION DERM
LOCATION SIMPLE: ABDOMEN
LOCATION SIMPLE: LEFT UPPER BACK

## 2019-08-16 ASSESSMENT — LOCATION ZONE DERM: LOCATION ZONE: TRUNK

## 2019-09-26 DIAGNOSIS — B35.1 OM (ONYCHOMYCOSIS): ICD-10-CM

## 2019-09-26 RX ORDER — TERBINAFINE HYDROCHLORIDE 250 MG/1
250 TABLET ORAL DAILY
Qty: 90 TAB | Refills: 0 | Status: SHIPPED | OUTPATIENT
Start: 2019-09-26 | End: 2020-07-21 | Stop reason: SDUPTHER

## 2019-09-26 NOTE — PROGRESS NOTES
Requested Prescriptions     Signed Prescriptions Disp Refills   • terbinafine (LAMISIL) 250 MG Tab 90 Tab 0     Sig: Take 1 Tab by mouth every day.       MAYCOL Gomez.

## 2019-12-03 RX ORDER — MELOXICAM 15 MG/1
TABLET ORAL
Qty: 90 TAB | Refills: 0 | Status: SHIPPED | OUTPATIENT
Start: 2019-12-03 | End: 2020-03-02

## 2020-02-29 DIAGNOSIS — G89.29 CHRONIC PAIN OF RIGHT KNEE: ICD-10-CM

## 2020-02-29 DIAGNOSIS — M25.561 CHRONIC PAIN OF RIGHT KNEE: ICD-10-CM

## 2020-02-29 DIAGNOSIS — G89.29 CHRONIC RIGHT-SIDED THORACIC BACK PAIN: ICD-10-CM

## 2020-02-29 DIAGNOSIS — M54.6 CHRONIC RIGHT-SIDED THORACIC BACK PAIN: ICD-10-CM

## 2020-03-02 RX ORDER — MELOXICAM 15 MG/1
TABLET ORAL
Qty: 90 TAB | Refills: 3 | Status: SHIPPED | OUTPATIENT
Start: 2020-03-02 | End: 2021-12-22 | Stop reason: SDUPTHER

## 2020-03-02 NOTE — TELEPHONE ENCOUNTER
Received request via: Pharmacy    Was the patient seen in the last year in this department? Yes lov 4/30/19    Does the patient have an active prescription (recently filled or refills available) for medication(s) requested? No

## 2020-03-03 NOTE — TELEPHONE ENCOUNTER
Requested Prescriptions     Pending Prescriptions Disp Refills   • meloxicam (MOBIC) 15 MG tablet [Pharmacy Med Name: MELOXICAM 15MG TABLETS] 90 Tab 3     Sig: TAKE 1 TABLET BY MOUTH EVERY DAY       MAYCOL Gomez.

## 2020-05-06 ENCOUNTER — OFFICE VISIT (OUTPATIENT)
Dept: URGENT CARE | Facility: PHYSICIAN GROUP | Age: 70
End: 2020-05-06
Payer: MEDICARE

## 2020-05-06 VITALS
RESPIRATION RATE: 14 BRPM | OXYGEN SATURATION: 96 % | HEIGHT: 65 IN | DIASTOLIC BLOOD PRESSURE: 80 MMHG | BODY MASS INDEX: 28.32 KG/M2 | TEMPERATURE: 98.9 F | HEART RATE: 77 BPM | WEIGHT: 170 LBS | SYSTOLIC BLOOD PRESSURE: 138 MMHG

## 2020-05-06 DIAGNOSIS — R19.7 ABDOMINAL PAIN, VOMITING, AND DIARRHEA: ICD-10-CM

## 2020-05-06 DIAGNOSIS — K21.9 GASTROESOPHAGEAL REFLUX DISEASE, ESOPHAGITIS PRESENCE NOT SPECIFIED: ICD-10-CM

## 2020-05-06 DIAGNOSIS — R11.10 ABDOMINAL PAIN, VOMITING, AND DIARRHEA: ICD-10-CM

## 2020-05-06 DIAGNOSIS — R10.9 ABDOMINAL PAIN, VOMITING, AND DIARRHEA: ICD-10-CM

## 2020-05-06 PROCEDURE — 99214 OFFICE O/P EST MOD 30 MIN: CPT | Performed by: PHYSICIAN ASSISTANT

## 2020-05-06 RX ORDER — OMEPRAZOLE 20 MG/1
20 CAPSULE, DELAYED RELEASE ORAL 2 TIMES DAILY
Qty: 28 CAP | Refills: 0 | Status: SHIPPED | OUTPATIENT
Start: 2020-05-06 | End: 2020-05-27 | Stop reason: SDUPTHER

## 2020-05-06 RX ORDER — SUCRALFATE 1 G/1
1 TABLET ORAL
Qty: 20 TAB | Refills: 0 | Status: SHIPPED | OUTPATIENT
Start: 2020-05-06 | End: 2020-05-27

## 2020-05-06 ASSESSMENT — ENCOUNTER SYMPTOMS
BLOOD IN STOOL: 0
DIZZINESS: 0
FEVER: 0
BELCHING: 1
HEARTBURN: 0
WEAKNESS: 0
CHILLS: 0
NAUSEA: 1
SHORTNESS OF BREATH: 0
HEADACHES: 0
DIAPHORESIS: 0
COUGH: 0
VOMITING: 1
CONSTIPATION: 0
DIARRHEA: 1
WEIGHT LOSS: 0
ABDOMINAL PAIN: 1
PALPITATIONS: 0

## 2020-05-06 ASSESSMENT — FIBROSIS 4 INDEX: FIB4 SCORE: 1.17

## 2020-05-06 ASSESSMENT — PAIN SCALES - GENERAL: PAINLEVEL: 10=SEVERE PAIN

## 2020-05-07 NOTE — PROGRESS NOTES
Subjective:      Shannan Dove is a 69 y.o. female who presents with Abdominal Pain (ABD pain, diarrhea, vomiting, nausea, headache, chest pressure started this morning)            Abdominal Pain   This is a new problem. The current episode started today. The problem has been waxing and waning. The pain is located in the generalized abdominal region. The quality of the pain is colicky. Associated symptoms include belching, diarrhea, nausea and vomiting. Pertinent negatives include no constipation, fever, headaches, melena or weight loss. Nothing aggravates the pain. The pain is relieved by nothing. Her past medical history is significant for GERD.       Review of Systems   Constitutional: Negative for chills, diaphoresis, fever, malaise/fatigue and weight loss.   Respiratory: Negative for cough and shortness of breath.    Cardiovascular: Negative for chest pain and palpitations.   Gastrointestinal: Positive for abdominal pain, diarrhea, nausea and vomiting. Negative for blood in stool, constipation, heartburn and melena.   Skin: Negative for itching and rash.   Neurological: Negative for dizziness, weakness and headaches.   All other systems reviewed and are negative.    PMH:  has a past medical history of Gyn exam, Heart murmur, Hyperlipidemia, Leucocytosis (12/27/2018), Pap smear, Post menopausal problems, Varicose veins of both legs with edema, and Vitamin D deficiency.  MEDS:   Current Outpatient Medications:   •  sucralfate (CARAFATE) 1 GM Tab, Take 1 Tab by mouth 4 Times a Day,Before Meals and at Bedtime for 5 days., Disp: 20 Tab, Rfl: 0  •  omeprazole (PRILOSEC) 20 MG delayed-release capsule, Take 1 Cap by mouth 2 times a day for 14 days., Disp: 28 Cap, Rfl: 0  •  meloxicam (MOBIC) 15 MG tablet, TAKE 1 TABLET BY MOUTH EVERY DAY, Disp: 90 Tab, Rfl: 3  •  triamcinolone acetonide (KENALOG) 0.1 % Cream, Apply 1 Application to affected area(s) 2 times a day as needed., Disp: 1 Tube, Rfl: 11  •  Omega-3 Fatty  "Acids (OMEGA 3 500 PO), Take 1,000 mg by mouth., Disp: , Rfl:   •  famotidine (PEPCID) 20 MG Tab, Take 1 Tab by mouth 2 times a day., Disp: 60 Tab, Rfl: 0  •  Calcium Carbonate 600 MG Tab, Take 1,200 mg by mouth every day., Disp: , Rfl:   •  Cholecalciferol (VITAMIN D3) 5000 units Tab, Take 5,000 Units by mouth every day., Disp: , Rfl:   •  terbinafine (LAMISIL) 250 MG Tab, Take 1 Tab by mouth every day. (Patient not taking: Reported on 5/6/2020), Disp: 90 Tab, Rfl: 0  ALLERGIES:   Allergies   Allergen Reactions   • Pcn [Penicillins] Hives     SURGHX:   Past Surgical History:   Procedure Laterality Date   • TONSILLECTOMY       SOCHX:  reports that she has never smoked. She has never used smokeless tobacco. She reports that she does not drink alcohol or use drugs.  FH: Family history was reviewed, no pertinent findings to report  Medications, Allergies, and current problem list reviewed today in Epic       Objective:     Blood Pressure 138/80   Pulse 77   Temperature 37.2 °C (98.9 °F) (Temporal)   Respiration 14   Height 1.651 m (5' 5\")   Weight 77.1 kg (170 lb)   Oxygen Saturation 96%   Body Mass Index 28.29 kg/m²      Physical Exam  Vitals signs reviewed.   Constitutional:       General: She is not in acute distress.     Appearance: Normal appearance. She is well-developed. She is not ill-appearing, toxic-appearing or diaphoretic.   HENT:      Head: Normocephalic and atraumatic.      Right Ear: External ear normal.      Left Ear: External ear normal.      Nose: Nose normal.   Eyes:      General: Lids are normal.      Conjunctiva/sclera: Conjunctivae normal.   Neck:      Musculoskeletal: Full passive range of motion without pain, normal range of motion and neck supple.   Cardiovascular:      Rate and Rhythm: Normal rate and regular rhythm.      Heart sounds: Normal heart sounds, S1 normal and S2 normal. No murmur. No friction rub. No gallop.    Pulmonary:      Effort: Pulmonary effort is normal. No " respiratory distress.      Breath sounds: Normal breath sounds. No decreased breath sounds, wheezing or rales.   Chest:      Chest wall: No tenderness.   Abdominal:      General: Bowel sounds are normal. There is no distension or abdominal bruit.      Palpations: Abdomen is soft. Abdomen is not rigid. There is no shifting dullness, fluid wave, mass or pulsatile mass.      Tenderness: There is no abdominal tenderness. There is no guarding or rebound. Negative signs include Lopez's sign and McBurney's sign.      Hernia: No hernia is present.      Comments: Abdomen: Generalized PTP- not out of proportion.  Soft and nondistended. Normal bowel sounds. No hepatosplenomegaly or masses, or hernias. No rebound or guarding.     Musculoskeletal: Normal range of motion.         General: No tenderness or deformity.   Skin:     General: Skin is warm and dry.      Findings: No bruising, ecchymosis or erythema.   Neurological:      Mental Status: She is alert and oriented to person, place, and time.   Psychiatric:         Speech: Speech normal.         Behavior: Behavior normal.         Thought Content: Thought content normal.         Judgment: Judgment normal.                 Assessment/Plan:     Pt is a 69 yr old female who presents for evaluation of vomiting and diarrhea.  Pt states she believes she had food poisoning for mexican food the night prior.  Pt denies red flag symptoms of blood/mucus in BM, recent travel, camping, antibiotics/hospitalization.  Pt is tolerated PO liquids.  Vital signs normal.  Abdomen: Generalized PTP of abdomen nondistended. Normal bowel sounds. No hepatosplenomegaly or masses, or hernias. No rebound or guarding.  Skin turgor is normal with no delayed tenting. Likely gastroenteritis. Pt declines further work up at this time.  She will return if symptoms continue or worsen.    Diagnosis differential includes but not limited to:    Common:  Gastroenteritis viral vs bacterial, cholecystitis,  appendicitis.  Uncommon: IBD, obstructive/intussusception, ischemic colitis, c-diff, HUS.        1. Abdominal pain, vomiting, and diarrhea    - sucralfate (CARAFATE) 1 GM Tab; Take 1 Tab by mouth 4 Times a Day,Before Meals and at Bedtime for 5 days.  Dispense: 20 Tab; Refill: 0  - omeprazole (PRILOSEC) 20 MG delayed-release capsule; Take 1 Cap by mouth 2 times a day for 14 days.  Dispense: 28 Cap; Refill: 0    2. Gastroesophageal reflux disease, esophagitis presence not specified    Differential diagnosis, natural history, supportive care discussed. Follow-up with primary care provider within 7-10 days, emergency room precautions discussed.  Patient and/or family appears understanding of information.  Handout and review of patients diagnosis and treatment was discussed extensively.

## 2020-05-19 DIAGNOSIS — R11.10 ABDOMINAL PAIN, VOMITING, AND DIARRHEA: ICD-10-CM

## 2020-05-19 DIAGNOSIS — R10.9 ABDOMINAL PAIN, VOMITING, AND DIARRHEA: ICD-10-CM

## 2020-05-19 DIAGNOSIS — R19.7 ABDOMINAL PAIN, VOMITING, AND DIARRHEA: ICD-10-CM

## 2020-05-27 RX ORDER — OMEPRAZOLE 20 MG/1
20 CAPSULE, DELAYED RELEASE ORAL 2 TIMES DAILY
Qty: 28 CAP | Refills: 0 | Status: SHIPPED | OUTPATIENT
Start: 2020-05-27 | End: 2020-06-10

## 2020-05-27 RX ORDER — SUCRALFATE 1 G/1
TABLET ORAL
Qty: 20 TAB | Refills: 0 | Status: SHIPPED | OUTPATIENT
Start: 2020-05-27 | End: 2020-07-02

## 2020-05-27 NOTE — TELEPHONE ENCOUNTER
Received request via: Patient    Was the patient seen in the last year in this department? Yes 4/30/19    Does the patient have an active prescription (recently filled or refills available) for medication(s) requested? No     Patient was prescribed these medications in [facility] and would like refills.

## 2020-05-27 NOTE — TELEPHONE ENCOUNTER
Requested Prescriptions     Pending Prescriptions Disp Refills   • sucralfate (CARAFATE) 1 GM Tab [Pharmacy Med Name: SUCRALFATE 1GM TABLETS] 20 Tab 0     Sig: TAKE 1 TABLET BY MOUTH FOUR TIMES DAILY BEFORE MEALS AND AT BEDTIME FOR 5 DAYS   • omeprazole (PRILOSEC) 20 MG delayed-release capsule 28 Cap 0     Sig: Take 1 Cap by mouth 2 times a day for 14 days.       Joyce Gill A.P.R.N.

## 2020-07-02 ENCOUNTER — TELEPHONE (OUTPATIENT)
Dept: MEDICAL GROUP | Facility: PHYSICIAN GROUP | Age: 70
End: 2020-07-02

## 2020-07-02 ENCOUNTER — OFFICE VISIT (OUTPATIENT)
Dept: MEDICAL GROUP | Facility: PHYSICIAN GROUP | Age: 70
End: 2020-07-02
Payer: MEDICARE

## 2020-07-02 VITALS
DIASTOLIC BLOOD PRESSURE: 80 MMHG | TEMPERATURE: 97.4 F | BODY MASS INDEX: 27.16 KG/M2 | HEIGHT: 65 IN | SYSTOLIC BLOOD PRESSURE: 122 MMHG | OXYGEN SATURATION: 95 % | RESPIRATION RATE: 14 BRPM | HEART RATE: 82 BPM | WEIGHT: 163 LBS

## 2020-07-02 DIAGNOSIS — Z72.89 OTHER PROBLEMS RELATED TO LIFESTYLE: ICD-10-CM

## 2020-07-02 DIAGNOSIS — Z00.00 MEDICARE ANNUAL WELLNESS VISIT, SUBSEQUENT: ICD-10-CM

## 2020-07-02 DIAGNOSIS — Z12.31 ENCOUNTER FOR SCREENING MAMMOGRAM FOR BREAST CANCER: ICD-10-CM

## 2020-07-02 DIAGNOSIS — E55.9 VITAMIN D INSUFFICIENCY: ICD-10-CM

## 2020-07-02 DIAGNOSIS — B35.1 OM (ONYCHOMYCOSIS): ICD-10-CM

## 2020-07-02 DIAGNOSIS — Z23 NEED FOR VACCINATION: ICD-10-CM

## 2020-07-02 DIAGNOSIS — E78.2 MIXED HYPERLIPIDEMIA: ICD-10-CM

## 2020-07-02 DIAGNOSIS — K21.9 GASTROESOPHAGEAL REFLUX DISEASE WITHOUT ESOPHAGITIS: ICD-10-CM

## 2020-07-02 DIAGNOSIS — E07.9 THYROID MASS: ICD-10-CM

## 2020-07-02 DIAGNOSIS — M85.852 OSTEOPENIA OF NECK OF LEFT FEMUR: ICD-10-CM

## 2020-07-02 DIAGNOSIS — R73.9 HYPERGLYCEMIA: ICD-10-CM

## 2020-07-02 DIAGNOSIS — Z11.59 NEED FOR HEPATITIS C SCREENING TEST: ICD-10-CM

## 2020-07-02 PROCEDURE — G0439 PPPS, SUBSEQ VISIT: HCPCS | Performed by: NURSE PRACTITIONER

## 2020-07-02 ASSESSMENT — ACTIVITIES OF DAILY LIVING (ADL): BATHING_REQUIRES_ASSISTANCE: 0

## 2020-07-02 ASSESSMENT — ENCOUNTER SYMPTOMS: GENERAL WELL-BEING: GOOD

## 2020-07-02 ASSESSMENT — PATIENT HEALTH QUESTIONNAIRE - PHQ9: CLINICAL INTERPRETATION OF PHQ2 SCORE: 0

## 2020-07-02 ASSESSMENT — FIBROSIS 4 INDEX: FIB4 SCORE: 1.17

## 2020-07-02 NOTE — ASSESSMENT & PLAN NOTE
Chronic and ongoing.  Recently went to the urgent care was put on Prilosec and Carafate.  She is no longer taking these medications.  Currently taking Pepcid however she has taken in the past.  She states it is working well.

## 2020-07-02 NOTE — TELEPHONE ENCOUNTER
Future Appointments       Provider Department Center    7/6/2020 8:40 AM ELLEN Gomez; [facility]      ANNUAL WELLNESS VISIT PRE-VISIT PLANNING WITHOUT OUTREACH    1.  Reviewed note from last office visit with PCP: YES,  LOV 04/30/2019    2.  If any orders were placed at last visit, do we have Results/Consult Notes?        •  Labs - Labs were not ordered at last office visit.       •  Imaging - Imaging was not ordered at last office visit.       •  Referrals - No referrals were ordered at last office visit.    3.  Immunizations were updated in [place] using WebIZ?: Yes       •  WebIZ Recommendations: FLU, TD, SHINGRIX (Shingles) and MMR        •  Is patient due for Tdap? NO       •  Is patient due for Shingrix? YES. Patient was notified of copay/out of pocket cost.     4.  Patient is due for the following Health Maintenance Topics:   Health Maintenance Due   Topic Date Due   • HEPATITIS C SCREENING  1950   • IMM ZOSTER VACCINES (2 of 3) 12/16/2011   • Annual Wellness Visit  04/30/2020     5.  Reviewed/Updated the following with patient:       •   Preferred Pharmacy? YES       •   Preferred Lab? YES       •   Preferred Communication? YES       •   Allergies? YES       •   Medications? YES. Was Abstract Encounter opened and chart updated? YES       •   Social History? YES. Was Abstract Encounter opened and chart updated? YES       •   Family History (document living status of immediate family members and if + hx of  cancer, diabetes, hypertension, hyperlipidemia, heart attack, stroke) YES. Was Abstract Encounter opened and chart updated? YES    6.  Care Team Updated:       •   DME Company (gait device, O2, CPAP, etc.): NO       •   Other Specialists (eye doctor, derm, GYN, cardiology, endo, etc): YES    7. Orders for overdue Health Maintenance topics pended in Pre-Charting? NO    8.  Patient has the following Care Path diagnoses on Problem List:  NONE    9.   Patient was advised: “This is a free wellness visit. The provider will screen for medical conditions to help you stay healthy. If you have other concerns to address you may be asked to discuss these at a separate visit or there may be an additional fee.”     10.  Patient was informed to arrive 15 min prior to their scheduled appointment and bring in their medication bottles.

## 2020-07-02 NOTE — PROGRESS NOTES
Chief Complaint   Patient presents with   • Annual Wellness Visit         HPI:  Shannan is a 69 y.o. here for Medicare Annual Wellness Visit    Gastroesophageal reflux disease without esophagitis  Chronic and ongoing.  Recently went to the urgent care was put on Prilosec and Carafate.  She is no longer taking these medications.  Currently taking Pepcid however she has taken in the past.  She states it is working well.    OM (onychomycosis)  Chronic ongoing.  She was taking terbinafine she started a 3-month break in April from the medication.  She is requesting that her labs be updated.  If everything is good before labs she would like to restart the medication, as her left big toe still has fungus.    Hyperglycemia  Chronic and ongoing.  Due for updated labs.    Mixed hyperlipidemia  Chronic and ongoing.  Currently taking over-the-counter omega-3 supplement.  She continues to work on healthy diet and exercise.  Due for updated labs.     12/4/2018 06:40 7/11/2019 09:39   Cholesterol,Tot 202 (H) 246 (H)   Triglycerides 154 (H) 127   HDL 45 50    (H) 171 (H)       Osteopenia of neck of left femur  Chronic and ongoing.  Last bone density was in 2019.  She continues to take vitamin D and calcium supplements.  She denies any falls.    Thyroid mass  Chronic and stable.  She has had soft tissue ultrasounds in the past.  Due for updated labs.  Patient denies any difficulty swallowing, fatigue, cold/heat intolerance, constipation, weight gain, skin changes, hair changes, hair loss, brittle nails, or edema.    Vitamin D insufficiency  Chronic and improving.  She continues to take vitamin D and calcium supplements.  Due for updated labs.     Ref. Range 8/4/2017 09:30 7/11/2019 09:39   25-Hydroxy   Vitamin D 25 Latest Ref Range: 30 - 100 ng/mL 25 (L) 58       Patient Active Problem List    Diagnosis Date Noted   • Hyperglycemia 12/27/2018   • Varicose vein of leg 08/10/2018   • Gastroesophageal reflux disease without  esophagitis 12/22/2017   • Thyroid mass 06/23/2017   • Vitamin D insufficiency 06/22/2017   • OM (onychomycosis) 06/22/2017   • AK (actinic keratosis) 06/23/2016   • Chronic pain of right knee 06/23/2016   • Mixed hyperlipidemia 06/23/2016   • Right-sided thoracic back pain 06/23/2016   • Heart murmur    • Osteopenia of neck of left femur        Current Outpatient Medications   Medication Sig Dispense Refill   • Zoster Vac Recomb Adjuvanted (SHINGRIX) 50 MCG/0.5ML Recon Susp 0.5 mL by Intramuscular route Once for 1 dose. 0.5 mL 0   • tetanus-diphth-acell pertussis (BOOSTRIX, AGES 7 & OLDER,) 5-2.5-18.5 LF-MCG/0.5 Suspension 0.5 mL by Intramuscular route Once PRN (8-) for up to 1 dose. 0.5 Syringe 0   • meloxicam (MOBIC) 15 MG tablet TAKE 1 TABLET BY MOUTH EVERY DAY 90 Tab 3   • terbinafine (LAMISIL) 250 MG Tab Take 1 Tab by mouth every day. 90 Tab 0   • triamcinolone acetonide (KENALOG) 0.1 % Cream Apply 1 Application to affected area(s) 2 times a day as needed. 1 Tube 11   • Omega-3 Fatty Acids (OMEGA 3 500 PO) Take 1,000 mg by mouth.     • famotidine (PEPCID) 20 MG Tab Take 1 Tab by mouth 2 times a day. 60 Tab 0   • Calcium Carbonate 600 MG Tab Take 1,200 mg by mouth every day.     • Cholecalciferol (VITAMIN D3) 5000 units Tab Take 5,000 Units by mouth every day.       No current facility-administered medications for this visit.         Patient is taking medications as noted in medication list.  Current supplements as per medication list.     Allergies: Pcn [penicillins]    Current social contact/activities: Movies, Spends time with family, travels with friends     Is patient current with immunizations? No, due for SHINGRIX (Shingles). Patient is interested in receiving NONE today.    She  reports that she has never smoked. She has never used smokeless tobacco. She reports that she does not drink alcohol or use drugs.  Counseling given: Not Answered        DPA/Advanced directive: Patient has Advanced  Directive on file.     ROS:    Gait: Uses no assistive device   Ostomy: No   Other tubes: No   Amputations: No   Chronic oxygen use No   Last eye exam 2019   Wears hearing aids: No   : Denies any urinary leakage during the last 6 months      Screening:    Depression Screening    Little interest or pleasure in doing things?  0 - not at all  Feeling down, depressed, or hopeless? 0 - not at all  Patient Health Questionnaire Score: 0    If depressive symptoms identified deferred to follow up visit unless specifically addressed in assessment and plan.    Interpretation of PHQ-9 Total Score   Score Severity   1-4 No Depression   5-9 Mild Depression   10-14 Moderate Depression   15-19 Moderately Severe Depression   20-27 Severe Depression    Screening for Cognitive Impairment    Three Minute Recall (river, nation, finger)  3/3 River, nation, finger  Oj clock face with all 12 numbers and set the hands to show 10 past 11.  Yes  3/5 time 11:10  If cognitive concerns identified, deferred for follow up unless specifically addressed in assessment and plan.    Fall Risk Assessment    Has the patient had two or more falls in the last year or any fall with injury in the last year?  No  If fall risk identified, deferred for follow up unless specifically addressed in assessment and plan.    Safety Assessment    Throw rugs on floor.  No  Handrails on all stairs.  Yes  Good lighting in all hallways.  Yes  Difficulty hearing.  No  Patient counseled about all safety risks that were identified.    Functional Assessment ADLs    Are there any barriers preventing you from cooking for yourself or meeting nutritional needs?  No.    Are there any barriers preventing you from driving safely or obtaining transportation?  No.    Are there any barriers preventing you from using a telephone or calling for help?  No.    Are there any barriers preventing you from shopping?  No.    Are there any barriers preventing you from taking care of your own  finances?  No.    Are there any barriers preventing you from managing your medications?  No.    Are there any barriers preventing you from showering, bathing or dressing yourself?  No.    Are you currently engaging in any exercise or physical activity?  Yes.  Garden, yard work, house chores, shopping, walking  What is your perception of your health?  Good.    Health Maintenance Summary                HEPATITIS C SCREENING Overdue 1950     IMM ZOSTER VACCINES Overdue 12/16/2011      Done 10/21/2011 Imm Admin: Zoster Vaccine Live (ZVL) (Zostavax)    Annual Wellness Visit Overdue 4/30/2020      Done 4/30/2019 Visit Dx: Medicare annual wellness visit, subsequent     Patient has more history with this topic...    IMM DTaP/Tdap/Td Vaccine Next Due 8/11/2020      Done 8/11/2010 Imm Admin: Tdap Vaccine    MAMMOGRAM Next Due 8/16/2020      Done 8/16/2019 MA-SCREENING MAMMO BILAT W/TOMOSYNTHESIS W/CAD     Patient has more history with this topic...    IMM INFLUENZA Next Due 9/1/2020      Done 9/27/2019 Pat Imm: Influenza, Unspecified - Historical Data     Patient has more history with this topic...    BONE DENSITY Next Due 5/17/2021      Done 5/17/2019 DS-BONE DENSITY STUDY (DEXA)     Patient has more history with this topic...    COLONOSCOPY Next Due 8/3/2021      Done 8/3/2016 REFERRAL TO GI FOR COLONOSCOPY          Patient Care Team:  PRAFUL [email or web address] as PCP - General (Family Medicine)  Becky Mcconnell [facility] as Medical Home Care Manager (Dentistry)  Markie [facility] as Medical Home Care Manager (Optometry)  ELLEN Sanches as Consulting Physician (Family Medicine)  Amy R. Schoening, P.A. as Consulting Physician (Dermatology)  YESICA Franklin as Consulting Physician (Gastroenterology)  Ruben VALLADARES D.O. as Consulting Physician (Radiology)    Social History     Tobacco Use   • Smoking status: Never Smoker   • Smokeless tobacco: Never Used   Substance Use Topics   •  "Alcohol use: No   • Drug use: No     Family History   Problem Relation Age of Onset   • Heart Disease Mother         tachycardia, pacemaker   • Cancer Mother         colon ca - resected   • Psychiatric Illness Father         suicide   • No Known Problems Brother    • Cancer Brother         Colon cancer   • Stroke Brother    • Alcohol/Drug Brother         marijuana   • Hypertension Brother    • Hypertension Maternal Aunt    • Alcohol/Drug Maternal Uncle    • Asthma Maternal Grandmother    • Heart Disease Maternal Grandmother    • Alcohol/Drug Maternal Grandfather    • Heart Disease Paternal Grandmother    • No Known Problems Paternal Grandfather      She  has a past medical history of Gyn exam, Heart murmur, Hyperlipidemia, Leucocytosis (12/27/2018), Pap smear, Post menopausal problems, Varicose veins of both legs with edema, and Vitamin D deficiency.   Past Surgical History:   Procedure Laterality Date   • TONSILLECTOMY             Exam:     /80 (BP Location: Left arm, Patient Position: Sitting, BP Cuff Size: Adult)   Pulse 82   Temp 36.3 °C (97.4 °F) (Temporal)   Resp 14   Ht 1.651 m (5' 5\")   Wt 73.9 kg (163 lb)   SpO2 95%  Body mass index is 27.12 kg/m².    Hearing excellent.    Dentition good  Alert, oriented in no acute distress.  Eye contact is good, speech goal directed, affect calm      Assessment and Plan. The following treatment and monitoring plan is recommended:    1. Medicare annual wellness visit, subsequent  - CBC WITH DIFFERENTIAL; Future  - Comp Metabolic Panel; Future  - Lipid Profile; Future  - TSH WITH REFLEX TO FT4; Future  - VITAMIN D,25 HYDROXY; Future  - HEMOGLOBIN A1C; Future  - Subsequent Annual Wellness Visit - Includes PPPS ()    2. OM (onychomycosis)  Chronic and ongoing  Due for updated labs, will reorder terbinafine pending lab results.    3. Gastroesophageal reflux disease without esophagitis  Chronic and ongoing  Continue to take Pepcid daily    4. Thyroid " mass  Chronic and stable  Due for updated labs  - TSH WITH REFLEX TO FT4; Future    5. Hyperglycemia  Chronic and stable  Due for updated labs  - HEMOGLOBIN A1C; Future    6. Mixed hyperlipidemia  Chronic and ongoing  Due for updated labs  - Lipid Profile; Future    7. Osteopenia of neck of left femur  Chronic and ongoing  Continue to take calcium and vitamin D supplement  Due for updated labs  - VITAMIN D,25 HYDROXY; Future    8. Vitamin D insufficiency  Chronic and improving  Continue to take vitamin D supplement  - VITAMIN D,25 HYDROXY; Future    9. Encounter for screening mammogram for breast cancer  - MA-SCREENING MAMMO BILAT W/TOMOSYNTHESIS W/CAD; Future    10. Need for vaccination  - Zoster Vac Recomb Adjuvanted (SHINGRIX) 50 MCG/0.5ML Recon Susp; 0.5 mL by Intramuscular route Once for 1 dose.  Dispense: 0.5 mL; Refill: 0  - tetanus-diphth-acell pertussis (BOOSTRIX, AGES 7 & OLDER,) 5-2.5-18.5 LF-MCG/0.5 Suspension; 0.5 mL by Intramuscular route Once PRN (8-) for up to 1 dose.  Dispense: 0.5 Syringe; Refill: 0    11. Need for hepatitis C screening test  - HCV Scrn ( 4747-0183 1xLife); Future    12. Other problems related to lifestyle  - HCV Scrn ( 4620-8157 1xLife); Future        Services suggested: No services needed at this time  Health Care Screening recommendations as per orders if indicated.  Referrals offered: PT/OT/Nutrition counseling/Behavioral Health/Smoking cessation as per orders if indicated.    Discussion today about general wellness and lifestyle habits:    · Prevent falls and reduce trip hazards; Cautioned about securing or removing rugs.  · Have a working fire alarm and carbon monoxide detector;   · Engage in regular physical activity and social activities       Follow-up: Return in about 1 year (around 2021) for HC/PCP Annual Wellness Visit- [facility].

## 2020-07-02 NOTE — ASSESSMENT & PLAN NOTE
Chronic ongoing.  She was taking terbinafine she started a 3-month break in April from the medication.  She is requesting that her labs be updated.  If everything is good before labs she would like to restart the medication, as her left big toe still has fungus.

## 2020-07-03 NOTE — ASSESSMENT & PLAN NOTE
Chronic and ongoing.  Currently taking over-the-counter omega-3 supplement.  She continues to work on healthy diet and exercise.  Due for updated labs.     12/4/2018 06:40 7/11/2019 09:39   Cholesterol,Tot 202 (H) 246 (H)   Triglycerides 154 (H) 127   HDL 45 50    (H) 171 (H)

## 2020-07-03 NOTE — ASSESSMENT & PLAN NOTE
Chronic and improving.  She continues to take vitamin D and calcium supplements.  Due for updated labs.     Ref. Range 8/4/2017 09:30 7/11/2019 09:39   25-Hydroxy   Vitamin D 25 Latest Ref Range: 30 - 100 ng/mL 25 (L) 58

## 2020-07-03 NOTE — ASSESSMENT & PLAN NOTE
Chronic and ongoing.  Last bone density was in 2019.  She continues to take vitamin D and calcium supplements.  She denies any falls.

## 2020-07-03 NOTE — ASSESSMENT & PLAN NOTE
Chronic and stable.  She has had soft tissue ultrasounds in the past.  Due for updated labs.  Patient denies any difficulty swallowing, fatigue, cold/heat intolerance, constipation, weight gain, skin changes, hair changes, hair loss, brittle nails, or edema.

## 2020-07-06 ENCOUNTER — APPOINTMENT (OUTPATIENT)
Dept: MEDICAL GROUP | Facility: PHYSICIAN GROUP | Age: 70
End: 2020-07-06
Payer: MEDICARE

## 2020-07-17 ENCOUNTER — HOSPITAL ENCOUNTER (OUTPATIENT)
Dept: LAB | Facility: MEDICAL CENTER | Age: 70
End: 2020-07-17
Attending: NURSE PRACTITIONER
Payer: MEDICARE

## 2020-07-17 DIAGNOSIS — Z00.00 MEDICARE ANNUAL WELLNESS VISIT, SUBSEQUENT: ICD-10-CM

## 2020-07-17 DIAGNOSIS — M85.852 OSTEOPENIA OF NECK OF LEFT FEMUR: ICD-10-CM

## 2020-07-17 DIAGNOSIS — E07.9 THYROID MASS: ICD-10-CM

## 2020-07-17 DIAGNOSIS — E55.9 VITAMIN D INSUFFICIENCY: ICD-10-CM

## 2020-07-17 DIAGNOSIS — R73.9 HYPERGLYCEMIA: ICD-10-CM

## 2020-07-17 DIAGNOSIS — Z11.59 NEED FOR HEPATITIS C SCREENING TEST: ICD-10-CM

## 2020-07-17 DIAGNOSIS — Z72.89 OTHER PROBLEMS RELATED TO LIFESTYLE: ICD-10-CM

## 2020-07-17 DIAGNOSIS — E78.2 MIXED HYPERLIPIDEMIA: ICD-10-CM

## 2020-07-17 LAB
25(OH)D3 SERPL-MCNC: 41 NG/ML (ref 30–100)
ALBUMIN SERPL BCP-MCNC: 4.5 G/DL (ref 3.2–4.9)
ALBUMIN/GLOB SERPL: 1.7 G/DL
ALP SERPL-CCNC: 53 U/L (ref 30–99)
ALT SERPL-CCNC: 28 U/L (ref 2–50)
ANION GAP SERPL CALC-SCNC: 12 MMOL/L (ref 7–16)
AST SERPL-CCNC: 21 U/L (ref 12–45)
BASOPHILS # BLD AUTO: 0.4 % (ref 0–1.8)
BASOPHILS # BLD: 0.02 K/UL (ref 0–0.12)
BILIRUB SERPL-MCNC: 0.6 MG/DL (ref 0.1–1.5)
BUN SERPL-MCNC: 22 MG/DL (ref 8–22)
CALCIUM SERPL-MCNC: 9.3 MG/DL (ref 8.5–10.5)
CHLORIDE SERPL-SCNC: 104 MMOL/L (ref 96–112)
CHOLEST SERPL-MCNC: 210 MG/DL (ref 100–199)
CO2 SERPL-SCNC: 25 MMOL/L (ref 20–33)
CREAT SERPL-MCNC: 0.64 MG/DL (ref 0.5–1.4)
EOSINOPHIL # BLD AUTO: 0.14 K/UL (ref 0–0.51)
EOSINOPHIL NFR BLD: 3.1 % (ref 0–6.9)
ERYTHROCYTE [DISTWIDTH] IN BLOOD BY AUTOMATED COUNT: 40.6 FL (ref 35.9–50)
EST. AVERAGE GLUCOSE BLD GHB EST-MCNC: 111 MG/DL
FASTING STATUS PATIENT QL REPORTED: NORMAL
GLOBULIN SER CALC-MCNC: 2.6 G/DL (ref 1.9–3.5)
GLUCOSE SERPL-MCNC: 92 MG/DL (ref 65–99)
HBA1C MFR BLD: 5.5 % (ref 0–5.6)
HCT VFR BLD AUTO: 42.4 % (ref 37–47)
HDLC SERPL-MCNC: 46 MG/DL
HGB BLD-MCNC: 13.9 G/DL (ref 12–16)
IMM GRANULOCYTES # BLD AUTO: 0 K/UL (ref 0–0.11)
IMM GRANULOCYTES NFR BLD AUTO: 0 % (ref 0–0.9)
LDLC SERPL CALC-MCNC: 137 MG/DL
LYMPHOCYTES # BLD AUTO: 2.21 K/UL (ref 1–4.8)
LYMPHOCYTES NFR BLD: 49.1 % (ref 22–41)
MCH RBC QN AUTO: 29.3 PG (ref 27–33)
MCHC RBC AUTO-ENTMCNC: 32.8 G/DL (ref 33.6–35)
MCV RBC AUTO: 89.5 FL (ref 81.4–97.8)
MONOCYTES # BLD AUTO: 0.36 K/UL (ref 0–0.85)
MONOCYTES NFR BLD AUTO: 8 % (ref 0–13.4)
NEUTROPHILS # BLD AUTO: 1.77 K/UL (ref 2–7.15)
NEUTROPHILS NFR BLD: 39.4 % (ref 44–72)
NRBC # BLD AUTO: 0 K/UL
NRBC BLD-RTO: 0 /100 WBC
PLATELET # BLD AUTO: 268 K/UL (ref 164–446)
PMV BLD AUTO: 10.3 FL (ref 9–12.9)
POTASSIUM SERPL-SCNC: 4.1 MMOL/L (ref 3.6–5.5)
PROT SERPL-MCNC: 7.1 G/DL (ref 6–8.2)
RBC # BLD AUTO: 4.74 M/UL (ref 4.2–5.4)
SODIUM SERPL-SCNC: 141 MMOL/L (ref 135–145)
TRIGL SERPL-MCNC: 134 MG/DL (ref 0–149)
TSH SERPL DL<=0.005 MIU/L-ACNC: 1.44 UIU/ML (ref 0.38–5.33)
WBC # BLD AUTO: 4.5 K/UL (ref 4.8–10.8)

## 2020-07-17 PROCEDURE — 80053 COMPREHEN METABOLIC PANEL: CPT

## 2020-07-17 PROCEDURE — 85025 COMPLETE CBC W/AUTO DIFF WBC: CPT

## 2020-07-17 PROCEDURE — 82306 VITAMIN D 25 HYDROXY: CPT

## 2020-07-17 PROCEDURE — 80061 LIPID PANEL: CPT

## 2020-07-17 PROCEDURE — G0472 HEP C SCREEN HIGH RISK/OTHER: HCPCS

## 2020-07-17 PROCEDURE — 36415 COLL VENOUS BLD VENIPUNCTURE: CPT

## 2020-07-17 PROCEDURE — 84443 ASSAY THYROID STIM HORMONE: CPT

## 2020-07-17 PROCEDURE — 83036 HEMOGLOBIN GLYCOSYLATED A1C: CPT

## 2020-07-18 LAB — HCV AB SER QL: NORMAL

## 2020-07-21 DIAGNOSIS — B35.1 OM (ONYCHOMYCOSIS): ICD-10-CM

## 2020-07-21 RX ORDER — TERBINAFINE HYDROCHLORIDE 250 MG/1
250 TABLET ORAL DAILY
Qty: 84 TAB | Refills: 0 | Status: SHIPPED | OUTPATIENT
Start: 2020-07-21 | End: 2021-07-19 | Stop reason: SDUPTHER

## 2020-07-22 NOTE — PROGRESS NOTES
Requested Prescriptions     Signed Prescriptions Disp Refills   • terbinafine (LAMISIL) 250 MG Tab 84 Tab 0     Sig: Take 1 Tab by mouth every day for 84 days.       Joyce Gill A.P.R.N.

## 2020-09-04 ENCOUNTER — HOSPITAL ENCOUNTER (OUTPATIENT)
Dept: RADIOLOGY | Facility: MEDICAL CENTER | Age: 70
End: 2020-09-04
Attending: NURSE PRACTITIONER
Payer: MEDICARE

## 2020-09-04 DIAGNOSIS — Z12.31 ENCOUNTER FOR SCREENING MAMMOGRAM FOR BREAST CANCER: ICD-10-CM

## 2020-09-04 PROCEDURE — 77067 SCR MAMMO BI INCL CAD: CPT

## 2020-09-23 ENCOUNTER — APPOINTMENT (RX ONLY)
Dept: URBAN - METROPOLITAN AREA CLINIC 31 | Facility: CLINIC | Age: 70
Setting detail: DERMATOLOGY
End: 2020-09-23

## 2020-09-23 DIAGNOSIS — Z71.89 OTHER SPECIFIED COUNSELING: ICD-10-CM

## 2020-09-23 DIAGNOSIS — D22 MELANOCYTIC NEVI: ICD-10-CM

## 2020-09-23 DIAGNOSIS — L81.4 OTHER MELANIN HYPERPIGMENTATION: ICD-10-CM

## 2020-09-23 DIAGNOSIS — L21.8 OTHER SEBORRHEIC DERMATITIS: ICD-10-CM

## 2020-09-23 DIAGNOSIS — L57.0 ACTINIC KERATOSIS: ICD-10-CM

## 2020-09-23 DIAGNOSIS — L82.1 OTHER SEBORRHEIC KERATOSIS: ICD-10-CM

## 2020-09-23 DIAGNOSIS — L40.0 PSORIASIS VULGARIS: ICD-10-CM

## 2020-09-23 DIAGNOSIS — D18.0 HEMANGIOMA: ICD-10-CM

## 2020-09-23 DIAGNOSIS — T07XXXA INSECT BITE, NONVENOMOUS, OF OTHER, MULTIPLE, AND UNSPECIFIED SITES, WITHOUT MENTION OF INFECTION: ICD-10-CM

## 2020-09-23 PROBLEM — D18.01 HEMANGIOMA OF SKIN AND SUBCUTANEOUS TISSUE: Status: ACTIVE | Noted: 2020-09-23

## 2020-09-23 PROBLEM — S10.86XA INSECT BITE OF OTHER SPECIFIED PART OF NECK, INITIAL ENCOUNTER: Status: ACTIVE | Noted: 2020-09-23

## 2020-09-23 PROBLEM — D22.5 MELANOCYTIC NEVI OF TRUNK: Status: ACTIVE | Noted: 2020-09-23

## 2020-09-23 PROBLEM — D48.5 NEOPLASM OF UNCERTAIN BEHAVIOR OF SKIN: Status: ACTIVE | Noted: 2020-09-23

## 2020-09-23 PROBLEM — L30.9 DERMATITIS, UNSPECIFIED: Status: ACTIVE | Noted: 2020-09-23

## 2020-09-23 PROCEDURE — ? LIQUID NITROGEN

## 2020-09-23 PROCEDURE — 17000 DESTRUCT PREMALG LESION: CPT | Mod: 59

## 2020-09-23 PROCEDURE — 11102 TANGNTL BX SKIN SINGLE LES: CPT

## 2020-09-23 PROCEDURE — ? COUNSELING

## 2020-09-23 PROCEDURE — 17003 DESTRUCT PREMALG LES 2-14: CPT

## 2020-09-23 PROCEDURE — ? BIOPSY BY SHAVE METHOD

## 2020-09-23 PROCEDURE — ? ADDITIONAL NOTES

## 2020-09-23 PROCEDURE — 11103 TANGNTL BX SKIN EA SEP/ADDL: CPT

## 2020-09-23 PROCEDURE — 99213 OFFICE O/P EST LOW 20 MIN: CPT | Mod: 25

## 2020-09-23 PROCEDURE — ? OBSERVATION AND MEASURE

## 2020-09-23 ASSESSMENT — LOCATION DETAILED DESCRIPTION DERM
LOCATION DETAILED: LEFT NASAL DORSUM
LOCATION DETAILED: RIGHT DISTAL PRETIBIAL REGION
LOCATION DETAILED: MID-OCCIPITAL SCALP
LOCATION DETAILED: LEFT MEDIAL UPPER BACK
LOCATION DETAILED: EPIGASTRIC SKIN
LOCATION DETAILED: LEFT RIB CAGE
LOCATION DETAILED: RIGHT INFERIOR LATERAL NECK
LOCATION DETAILED: LEFT ANTERIOR MEDIAL DISTAL THIGH
LOCATION DETAILED: LEFT PROXIMAL DORSAL FOREARM
LOCATION DETAILED: RIGHT CENTRAL TEMPLE
LOCATION DETAILED: NASAL DORSUM
LOCATION DETAILED: LEFT MEDIAL MALAR CHEEK

## 2020-09-23 ASSESSMENT — LOCATION SIMPLE DESCRIPTION DERM
LOCATION SIMPLE: NOSE
LOCATION SIMPLE: LEFT FOREARM
LOCATION SIMPLE: RIGHT PRETIBIAL REGION
LOCATION SIMPLE: RIGHT ANTERIOR NECK
LOCATION SIMPLE: RIGHT TEMPLE
LOCATION SIMPLE: LEFT THIGH
LOCATION SIMPLE: LEFT CHEEK
LOCATION SIMPLE: POSTERIOR SCALP
LOCATION SIMPLE: ABDOMEN
LOCATION SIMPLE: LEFT UPPER BACK

## 2020-09-23 ASSESSMENT — LOCATION ZONE DERM
LOCATION ZONE: ARM
LOCATION ZONE: NOSE
LOCATION ZONE: FACE
LOCATION ZONE: SCALP
LOCATION ZONE: NECK
LOCATION ZONE: TRUNK
LOCATION ZONE: LEG

## 2020-09-23 NOTE — HPI: SKIN LESION
Is This A New Presentation, Or A Follow-Up?: Skin Lesion
Has Your Skin Lesion Been Treated?: not been treated
Additional History: Pt states she has been using TAC qd-bid approx three times a week.

## 2020-09-23 NOTE — PROCEDURE: BIOPSY BY SHAVE METHOD
Detail Level: Detailed
Depth Of Biopsy: dermis
Was A Bandage Applied: Yes
Size Of Lesion In Cm: 1.6
X Size Of Lesion In Cm: 0
Biopsy Type: H and E
Biopsy Method: Personna blade
Anesthesia Type: 1% lidocaine with epinephrine
Anesthesia Volume In Cc: 0.5
Hemostasis: Drysol and Electrocautery
Wound Care: Vaseline
Dressing: bandage
Destruction After The Procedure: No
Type Of Destruction Used: Curettage
Curettage Text: The wound bed was treated with curettage after the biopsy was performed.
Cryotherapy Text: The wound bed was treated with cryotherapy after the biopsy was performed.
Electrodesiccation Text: The wound bed was treated with electrodesiccation after the biopsy was performed.
Electrodesiccation And Curettage Text: The wound bed was treated with electrodesiccation and curettage after the biopsy was performed.
Silver Nitrate Text: The wound bed was treated with silver nitrate after the biopsy was performed.
Lab: [id number]
Lab Facility: 
Consent: Written consent was obtained and risks were reviewed including but not limited to scarring, infection, bleeding, scabbing, incomplete removal, nerve damage and allergy to anesthesia.
Post-Care Instructions: I reviewed with the patient in detail post-care instructions. Patient is to keep the biopsy site dry overnight, and then apply vaseline twice daily until healed.
Notification Instructions: Patient will be notified of biopsy results. However, patient instructed to call the office if not contacted within 2 weeks.
Billing Type: Third-Party Bill
Information: Selecting Yes will display possible errors in your note based on the variables you have selected. This validation is only offered as a suggestion for you. PLEASE NOTE THAT THE VALIDATION TEXT WILL BE REMOVED WHEN YOU FINALIZE YOUR NOTE. IF YOU WANT TO FAX A PRELIMINARY NOTE YOU WILL NEED TO TOGGLE THIS TO 'NO' IF YOU DO NOT WANT IT IN YOUR FAXED NOTE.
Size Of Lesion In Cm: 1

## 2020-09-23 NOTE — PROCEDURE: ADDITIONAL NOTES
Detail Level: Simple
Additional Notes: Spot mentioned on intake. Pt advised it does not appear suspicious for skin cancer. Advised to return to clinic if changes or becomes bothersome.
Additional Notes: Spot mentioned on intake.
Additional Notes: Pt states it is from a vein procedure she had done.
Additional Notes: Recommended OTC head and shoulders shampoo.

## 2020-09-23 NOTE — PROCEDURE: LIQUID NITROGEN
Render Note In Bullet Format When Appropriate: No
Detail Level: Detailed
Number Of Freeze-Thaw Cycles: 1 freeze-thaw cycle
Post-Care Instructions: I reviewed with the patient in detail post-care instructions. Patient is to wear sunprotection, and avoid picking at any of the treated lesions. Pt may apply Vaseline to crusted or scabbing areas.
Consent: The patient's consent was obtained including but not limited to risks of crusting, scabbing, blistering, scarring, darker or lighter pigmentary change, recurrence, incomplete removal and infection.
Aperture Size (Optional): B
Duration Of Freeze Thaw-Cycle (Seconds): 0

## 2020-10-02 ENCOUNTER — RX ONLY (OUTPATIENT)
Age: 70
Setting detail: RX ONLY
End: 2020-10-02

## 2020-10-02 RX ORDER — CLOBETASOL PROPIONATE 0.5 MG/G
CREAM TOPICAL
Qty: 1 | Refills: 3 | Status: ERX | COMMUNITY
Start: 2020-10-02

## 2020-10-27 ENCOUNTER — APPOINTMENT (RX ONLY)
Dept: URBAN - METROPOLITAN AREA CLINIC 31 | Facility: CLINIC | Age: 70
Setting detail: DERMATOLOGY
End: 2020-10-27

## 2020-10-27 DIAGNOSIS — L40.0 PSORIASIS VULGARIS: ICD-10-CM

## 2020-10-27 DIAGNOSIS — L57.0 ACTINIC KERATOSIS: ICD-10-CM

## 2020-10-27 PROCEDURE — 17000 DESTRUCT PREMALG LESION: CPT

## 2020-10-27 PROCEDURE — 99212 OFFICE O/P EST SF 10 MIN: CPT | Mod: 25

## 2020-10-27 PROCEDURE — ? LIQUID NITROGEN

## 2020-10-27 PROCEDURE — ? ADDITIONAL NOTES

## 2020-10-27 PROCEDURE — ? COUNSELING

## 2020-10-27 ASSESSMENT — LOCATION SIMPLE DESCRIPTION DERM
LOCATION SIMPLE: NOSE
LOCATION SIMPLE: RIGHT PRETIBIAL REGION

## 2020-10-27 ASSESSMENT — LOCATION DETAILED DESCRIPTION DERM
LOCATION DETAILED: NASAL DORSUM
LOCATION DETAILED: RIGHT DISTAL PRETIBIAL REGION

## 2020-10-27 ASSESSMENT — LOCATION ZONE DERM
LOCATION ZONE: NOSE
LOCATION ZONE: LEG

## 2020-10-27 NOTE — PROCEDURE: LIQUID NITROGEN
Render Note In Bullet Format When Appropriate: No
Number Of Freeze-Thaw Cycles: 1 freeze-thaw cycle
Detail Level: Detailed
Duration Of Freeze Thaw-Cycle (Seconds): 0
Aperture Size (Optional): B
Post-Care Instructions: I reviewed with the patient in detail post-care instructions. Patient is to wear sunprotection, and avoid picking at any of the treated lesions. Pt may apply Vaseline to crusted or scabbing areas.
Consent: The patient's consent was obtained including but not limited to risks of crusting, scabbing, blistering, scarring, darker or lighter pigmentary change, recurrence, incomplete removal and infection.

## 2020-10-27 NOTE — HPI: RASH (PSORIASIS)
Do You Have A Family History Of Psoriasis?: no
How Severe Is Your Psoriasis?: mild
Is This A New Presentation, Or A Follow-Up?: Psoriasis
Additional History: This is new since her biopsy last visit, she tried the clobetasol with good results.  She does not have any other spots like it.

## 2020-10-27 NOTE — PROCEDURE: ADDITIONAL NOTES
Detail Level: Simple
Additional Notes: Spot on nasal dorsum previously biopsied on 09/23/20, results proven AK
Additional Notes: we discussed psoriasis , this seems to be very localized, AAD hand out given.  Use  clobetasol  prn only twice daily for two weeks as needed for flares\\nRTC with any changes.

## 2021-03-03 ENCOUNTER — OFFICE VISIT (OUTPATIENT)
Dept: MEDICAL GROUP | Facility: PHYSICIAN GROUP | Age: 71
End: 2021-03-03
Payer: MEDICARE

## 2021-03-03 ENCOUNTER — HOSPITAL ENCOUNTER (OUTPATIENT)
Dept: RADIOLOGY | Facility: MEDICAL CENTER | Age: 71
End: 2021-03-03
Attending: NURSE PRACTITIONER
Payer: MEDICARE

## 2021-03-03 VITALS
HEART RATE: 81 BPM | SYSTOLIC BLOOD PRESSURE: 150 MMHG | TEMPERATURE: 97.1 F | DIASTOLIC BLOOD PRESSURE: 74 MMHG | BODY MASS INDEX: 29.32 KG/M2 | HEIGHT: 65 IN | WEIGHT: 176 LBS | RESPIRATION RATE: 16 BRPM | OXYGEN SATURATION: 93 %

## 2021-03-03 DIAGNOSIS — M25.511 ACUTE PAIN OF RIGHT SHOULDER: ICD-10-CM

## 2021-03-03 DIAGNOSIS — R60.0 BILATERAL LOWER EXTREMITY EDEMA: ICD-10-CM

## 2021-03-03 DIAGNOSIS — Z23 NEED FOR VACCINATION: ICD-10-CM

## 2021-03-03 PROCEDURE — 73030 X-RAY EXAM OF SHOULDER: CPT | Mod: RT

## 2021-03-03 PROCEDURE — 99214 OFFICE O/P EST MOD 30 MIN: CPT | Performed by: NURSE PRACTITIONER

## 2021-03-03 ASSESSMENT — FIBROSIS 4 INDEX: FIB4 SCORE: 1.02

## 2021-03-03 ASSESSMENT — PATIENT HEALTH QUESTIONNAIRE - PHQ9: CLINICAL INTERPRETATION OF PHQ2 SCORE: 0

## 2021-03-03 NOTE — PATIENT INSTRUCTIONS
Lymphedema    Lymphedema is swelling that is caused by the abnormal collection of lymph in the tissues under the skin. Lymph is fluid from the tissues in your body that is removed through the lymphatic system. This system is part of your body's defense system (immune system) and includes lymph nodes and lymph vessels. The lymph vessels collect and carry the excess fluid, fats, proteins, and wastes from the tissues of the body to the bloodstream. This system also works to clean and remove bacteria and waste products from the body.  Lymphedema occurs when the lymphatic system is blocked. When the lymph vessels or lymph nodes are blocked or damaged, lymph does not drain properly. This causes an abnormal buildup of lymph, which leads to swelling in the affected area. This may include the trunk area, or an arm or leg. Lymphedema cannot be cured by medicines, but various methods can be used to help reduce the swelling.  There are two types of lymphedema: primary lymphedema and secondary lymphedema.  What are the causes?  The cause of this condition depends on the type of lymphedema that you have.  · Primary lymphedema is caused by the absence of lymph vessels or having abnormal lymph vessels at birth.  · Secondary lymphedema occurs when lymph vessels are blocked or damaged. Secondary lymphedema is more common. Common causes of lymph vessel blockage include:  ? Skin infection, such as cellulitis.  ? Infection by parasites (filariasis).  ? Injury.  ? Radiation therapy.  ? Cancer.  ? Formation of scar tissue.  ? Surgery.  What are the signs or symptoms?  Symptoms of this condition include:  · Swelling of the arm or leg.  · A heavy or tight feeling in the arm or leg.  · Swelling of the feet, toes, or fingers. Shoes or rings may fit more tightly than before.  · Redness of the skin over the affected area.  · Limited movement of the affected limb.  · Sensitivity to touch or discomfort in the affected limb.  How is this  diagnosed?  This condition may be diagnosed based on:  · Your symptoms and medical history.  · A physical exam.  · Bioimpedance spectroscopy. In this test, painless electrical currents are used to measure fluid levels in your body.  · Imaging tests, such as:  ? Lymphoscintigraphy. In this test, a low dose of a radioactive substance is injected to trace the flow of lymph through the lymph vessels.  ? MRI.  ? CT scan.  ? Duplex ultrasound. This test uses sound waves to produce images of the vessels and the blood flow on a screen.  ? Lymphangiography. In this test, a contrast dye is injected into the lymph vessel to help show blockages.  How is this treated?  Treatment for this condition may depend on the cause of your lymphedema. Treatment may include:  · Complete decongestive therapy (CDT). This is done by a certified lymphedema therapist to reduce fluid congestion. This therapy includes:  ? Manual lymph drainage. This is a special massage technique that promotes lymph drainage out of a limb.  ? Skin care.  ? Compression wrapping of the affected area.  ? Specific exercises. Certain exercises can help fluid move out of the affected limb.  · Compression. Various methods may be used to apply pressure to the affected limb to reduce the swelling. They include:  ? Wearing compression stockings or sleeves on the affected limb.  ? Wrapping the affected limb with special bandages.  · Surgery. This is usually done for severe cases only. For example, surgery may be done if you have trouble moving the limb or if the swelling does not get better with other treatments.  If an underlying condition is causing the lymphedema, treatment for that condition will be done. For example, antibiotic medicines may be used to treat an infection.  Follow these instructions at home:  Self-care  · The affected area is more likely to become injured or infected. Take these steps to help prevent infection:  ? Keep the affected area clean and  dry.  ? Use approved creams or lotions to keep the skin moisturized.  ? Protect your skin from cuts:  § Use gloves while cooking or gardening.  § Do not walk barefoot.  § If you shave the affected area, use an electric razor.  · Do not wear tight clothes, shoes, or jewelry.  · Eat a healthy diet that includes a lot of fruits and vegetables.  Activity  · Exercise regularly as directed by your health care provider.  · Do not sit with your legs crossed.  · When possible, keep the affected limb raised (elevated) above the level of your heart.  · Avoid carrying things with an arm that is affected by lymphedema.  General instructions  · Wear compression stockings or sleeves as told by your health care provider.  · Note any changes in size of the affected limb. You may be instructed to take regular measurements and keep track of them.  · Take over-the-counter and prescription medicines only as told by your health care provider.  · If you were prescribed an antibiotic medicine, take or apply it as told by your health care provider. Do not stop using the antibiotic even if you start to feel better.  · Do not use heating pads or ice packs over the affected area.  · Avoid having blood draws, IV insertions, or blood pressure checked on the affected limb.  · Keep all follow-up visits as told by your health care provider. This is important.  Contact a health care provider if you:  · Continue to have swelling in your limb.  · Have a cut that does not heal.  · Have redness or pain in the affected area.  Get help right away if you:  · Have new swelling in your limb that comes on suddenly.  · Develop purplish spots, rash or sores (lesions) on your affected limb.  · Have shortness of breath.  · Have a fever or chills.  Summary  · Lymphedema is swelling that is caused by the abnormal collection of lymph in the tissues under the skin.  · Lymph is fluid from the tissues in your body that is removed through the lymphatic system. This  system collects and carries excess fluid, fats, proteins, and wastes from the tissues of the body to the bloodstream.  · Lymphedema causes swelling, pain, and redness in the affected area. This may include the trunk area, or an arm or leg.  · Treatment for this condition may depend on the cause of your lymphedema. Treatment may include complete decongestive therapy (CDT), compression methods, surgery, or treating the underlying cause.  This information is not intended to replace advice given to you by your health care provider. Make sure you discuss any questions you have with your health care provider.  Document Released: 10/14/2008 Document Revised: 12/31/2018 Document Reviewed: 12/31/2018  Elsevier Patient Education © 2020 [employer]

## 2021-03-03 NOTE — ASSESSMENT & PLAN NOTE
"New problem to examiner.  Patient reports that in December 2020 she was moving furniture and decided to wash the walls.  After washing the walls, she experienced very bad acute right shoulder pain that was so severe she was unable to put her jacket on.  States that the right shoulder pain has improved over time, now it is just constant and \"aggravating\".  Denies any trauma at that time to cause the pain.  Does report that in second grade she fell and broke her forearm, states that she knew something was wrong because she was unable to tie her shoes.  Her mother took her to the doctor for questionable pinkeye and happened to mention the right arm, turned out that the patient did break her arm.  She has occasionally had some forearm aching likely due to this, but it has never affected her shoulder.  States that now she feels like a click inside of her shoulder when she moves it, and has has applied heating pad, BenGay, Salonpas.  States that the pain is not interfering with her ability to do things and use her arm, states that she has to be cautious due to the pain and fear of reinjuring.  She has not tried NSAIDs.  "

## 2021-03-03 NOTE — ASSESSMENT & PLAN NOTE
This is a new problem to the examiner.  Patient reports that she was experiencing bilateral lower ankle and foot edema.  She is a current patient of minna Padilla, so she made an appointment.  She had ultrasound imaging of bilateral lower extremities, was told that the plan will be to do injections.  If the bilateral lower extremity edema persists, she will be referred to Fariba Rodriguez, with OT for lymphedema at [facility].  Patient is not sure that she has a diagnosis of lymphedema, but will follow up with OT at the lymphedema clinic if recommended by [facility] [place] for compression wraps and compression boots to help with swelling.

## 2021-03-04 NOTE — PROGRESS NOTES
"CC:   Chief Complaint   Patient presents with   • Pain     right shoulder x 3 months     HISTORY OF THE PRESENT ILLNESS: Patient is a 70 y.o. female. This pleasant patient is here today to discuss right shoulder pain since December 2020.    Health Maintenance: Reviewed    Bilateral lower extremity edema  This is a new problem to the examiner.  Patient reports that she was experiencing bilateral lower ankle and foot edema.  She is a current patient of minna Padilla, so she made an appointment.  She had ultrasound imaging of bilateral lower extremities, was told that the plan will be to do injections.  If the bilateral lower extremity edema persists, she will be referred to Fariba Rodriguez, with OT for lymphedema at [facility].  Patient is not sure that she has a diagnosis of lymphedema, but will follow up with OT at the lymphedema clinic if recommended by [facility] [place] for compression wraps and compression boots to help with swelling.    Acute pain of right shoulder  New problem to examiner.  Patient reports that in December 2020 she was moving furniture and decided to wash the walls.  After washing the walls, she experienced very bad acute right shoulder pain that was so severe she was unable to put her jacket on.  States that the right shoulder pain has improved over time, now it is just constant and \"aggravating\".  Denies any trauma at that time to cause the pain.  Does report that in second grade she fell and broke her forearm, states that she knew something was wrong because she was unable to tie her shoes.  Her mother took her to the doctor for questionable pinkeye and happened to mention the right arm, turned out that the patient did break her arm.  She has occasionally had some forearm aching likely due to this, but it has never affected her shoulder.  States that now she feels like a click inside of her shoulder when she moves it, and has has applied heating pad, BenGay, Salonpas.  States that " the pain is not interfering with her ability to do things and use her arm, states that she has to be cautious due to the pain and fear of reinjuring.  She has not tried NSAIDs.    Allergies: Pcn [penicillins]  Current Outpatient Medications Ordered in Epic   Medication Sig Dispense Refill   • meloxicam (MOBIC) 15 MG tablet TAKE 1 TABLET BY MOUTH EVERY DAY 90 Tab 3   • triamcinolone acetonide (KENALOG) 0.1 % Cream Apply 1 Application to affected area(s) 2 times a day as needed. 1 Tube 11   • Omega-3 Fatty Acids (OMEGA 3 500 PO) Take 1,000 mg by mouth.     • famotidine (PEPCID) 20 MG Tab Take 1 Tab by mouth 2 times a day. 60 Tab 0   • Calcium Carbonate 600 MG Tab Take 1,200 mg by mouth every day.     • Cholecalciferol (VITAMIN D3) 5000 units Tab Take 5,000 Units by mouth every day.       No current [facility]-ordered facility-administered medications on file.     Past Medical History:   Diagnosis Date   • Gyn exam    • Heart murmur    • Hyperlipidemia    • Leucocytosis 12/27/2018   • Pap smear    • Post menopausal problems    • Varicose veins of both legs with edema    • Vitamin D deficiency      Past Surgical History:   Procedure Laterality Date   • TONSILLECTOMY       Social History     Tobacco Use   • Smoking status: Never Smoker   • Smokeless tobacco: Never Used   Substance Use Topics   • Alcohol use: No   • Drug use: No     Social History     Social History Narrative   • Not on file     Family History   Problem Relation Age of Onset   • Heart Disease Mother         tachycardia, pacemaker   • Cancer Mother         colon ca - resected   • Psychiatric Illness Father         suicide   • No Known Problems Brother    • Cancer Brother         Colon cancer   • Stroke Brother    • Alcohol/Drug Brother         marijuana   • Hypertension Brother    • Hypertension Maternal Aunt    • Alcohol/Drug Maternal Uncle    • Asthma Maternal Grandmother    • Heart Disease Maternal Grandmother    • Alcohol/Drug Maternal Grandfather    • Heart  "Disease Paternal Grandmother    • No Known Problems Paternal Grandfather      ROS:   Constitutional: No fevers, chills, malaise/fatigue.  Eyes: No eye pain.  ENT: No sore throat, congestion.   Resp: No cough, shortness of breath.  CV: + Bilateral lower extremity and ankle edema.  No chest pain, palpitations.  GI: No nausea/vomiting, abdominal pain, constipation, diarrhea.  : No dysuria, hematuria.  MSK: + Right shoulder pain.  No weakness.  Skin: No rashes.  Neuro: No dizziness, weakness, headaches.  Psych: No suicidal ideations.    All remaining systems reviewed and found to be negative, except as stated above.        Exam: /74 (BP Location: Left arm, Patient Position: Sitting, BP Cuff Size: Adult)   Pulse 81   Temp 36.2 °C (97.1 °F) (Temporal)   Resp 16   Ht 1.651 m (5' 5\")   Wt 79.8 kg (176 lb)   SpO2 93%  Body mass index is 29.29 kg/m².    General: Well nourished, well developed female in NAD, awake and conversant.  Eyes: Normal conjunctiva, anicteric.  Round symmetrical pupils.  ENT: Hearing grossly intact.  No nasal discharge.  Neck: Neck is supple.  No masses or thyromegaly.  CV: No lower extremity edema.  Respiratory: Respirations are nonlabored.  No wheezing.  Abdomen: Non-Distended.  Skin: Warm.  No rashes or ulcers.  MSK: Normal ambulation.  No clubbing or cyanosis.  Right shoulder: shoulder appears normal, non-specific diffuse tenderness about the shoulder, has tenderness about the glenohumeral joint  has tenderness over the AC joint, has full ROM, sensory exam normal, motor exam normal, radial pulse intact.  Neuro: Sensation and CN II-XII grossly normal.  Psych: Alert and oriented.  Cooperative, appropriate mood and affect, normal judgment.     Assessment/Plan:  1. Acute pain of right shoulder  New problem to examiner, ongoing problem for the patient that began in December 2020.  Plan to complete right shoulder x-ray.  Will notify patient of results through [email or web address].  Continue topical " Swati Martinez.  Recommended alternating heat and ice.  Recommend resting right upper extremity.  Recommend over-the-counter NSAIDs such as ibuprofen for pain.  May consider referral to physical therapy or right shoulder steroid injection depending on x-ray results and patient symptoms.  - DX-SHOULDER 2+ RIGHT; Future    2. Bilateral lower extremity edema  New problem to examiner, ongoing problem for the patient.  Continue to follow with vascular medicine, OT for lymphedema if directed.    Educated in proper administration of medication(s) ordered today including safety, possible SE, risks, benefits, rationale and alternatives to therapy.   Supportive care, differential diagnoses, and indications for immediate follow-up discussed with patient.    Pathogenesis of diagnosis discussed including typical length and natural progression.    Instructed to return to clinic or nearest emergency department for any change in condition, further concerns, or worsening of symptoms.  Patient states understanding of the plan of care and discharge instructions.    Return if symptoms worsen or fail to improve.    Please note that this dictation was created using voice recognition software. I have made every reasonable attempt to correct obvious errors, but I expect that there are errors of grammar and possibly content that I did not discover before finalizing the note.

## 2021-06-28 ENCOUNTER — OFFICE VISIT (OUTPATIENT)
Dept: MEDICAL GROUP | Facility: PHYSICIAN GROUP | Age: 71
End: 2021-06-28
Payer: MEDICARE

## 2021-06-28 VITALS
BODY MASS INDEX: 28.49 KG/M2 | HEIGHT: 65 IN | TEMPERATURE: 98 F | HEART RATE: 77 BPM | DIASTOLIC BLOOD PRESSURE: 70 MMHG | SYSTOLIC BLOOD PRESSURE: 130 MMHG | WEIGHT: 171 LBS | OXYGEN SATURATION: 95 %

## 2021-06-28 DIAGNOSIS — M19.011 PRIMARY OSTEOARTHRITIS OF RIGHT SHOULDER: ICD-10-CM

## 2021-06-28 DIAGNOSIS — E55.9 VITAMIN D INSUFFICIENCY: ICD-10-CM

## 2021-06-28 DIAGNOSIS — R73.9 HYPERGLYCEMIA: ICD-10-CM

## 2021-06-28 DIAGNOSIS — B35.1 OM (ONYCHOMYCOSIS): ICD-10-CM

## 2021-06-28 DIAGNOSIS — E07.9 THYROID MASS: ICD-10-CM

## 2021-06-28 DIAGNOSIS — Z78.0 POSTMENOPAUSAL: ICD-10-CM

## 2021-06-28 DIAGNOSIS — Z12.11 SCREENING FOR COLORECTAL CANCER: ICD-10-CM

## 2021-06-28 DIAGNOSIS — M85.852 OSTEOPENIA OF NECK OF LEFT FEMUR: ICD-10-CM

## 2021-06-28 DIAGNOSIS — Z12.31 ENCOUNTER FOR SCREENING MAMMOGRAM FOR BREAST CANCER: ICD-10-CM

## 2021-06-28 DIAGNOSIS — Z00.00 ROUTINE HEALTH MAINTENANCE: ICD-10-CM

## 2021-06-28 DIAGNOSIS — Z00.00 MEDICARE ANNUAL WELLNESS VISIT, SUBSEQUENT: ICD-10-CM

## 2021-06-28 DIAGNOSIS — I83.93 VARICOSE VEINS OF BOTH LOWER EXTREMITIES, UNSPECIFIED WHETHER COMPLICATED: ICD-10-CM

## 2021-06-28 DIAGNOSIS — Z12.12 SCREENING FOR COLORECTAL CANCER: ICD-10-CM

## 2021-06-28 DIAGNOSIS — E78.2 MIXED HYPERLIPIDEMIA: ICD-10-CM

## 2021-06-28 DIAGNOSIS — K21.9 GASTROESOPHAGEAL REFLUX DISEASE WITHOUT ESOPHAGITIS: ICD-10-CM

## 2021-06-28 DIAGNOSIS — E66.3 OVERWEIGHT (BMI 25.0-29.9): ICD-10-CM

## 2021-06-28 PROCEDURE — G0439 PPPS, SUBSEQ VISIT: HCPCS | Performed by: NURSE PRACTITIONER

## 2021-06-28 ASSESSMENT — PATIENT HEALTH QUESTIONNAIRE - PHQ9: CLINICAL INTERPRETATION OF PHQ2 SCORE: 0

## 2021-06-28 ASSESSMENT — ENCOUNTER SYMPTOMS: GENERAL WELL-BEING: GOOD

## 2021-06-28 ASSESSMENT — FIBROSIS 4 INDEX: FIB4 SCORE: 1.02

## 2021-06-28 ASSESSMENT — ACTIVITIES OF DAILY LIVING (ADL): BATHING_REQUIRES_ASSISTANCE: 0

## 2021-06-28 NOTE — ASSESSMENT & PLAN NOTE
Chronic, ongoing.  Continues over-the-counter vitamin D and calcium supplements daily.  Due for updated DEXA scan.

## 2021-06-28 NOTE — ASSESSMENT & PLAN NOTE
"Chronic, ongoing.  Reports that she had bilateral leg procedures in 2017 and 2018 and felt great for 2019.  In March 2020 when she began having bilateral lower extremity heaviness, swelling, pain.  She followed up with vascular medicine was told that they have done \"everything that insurance approves\".  Patient states that vascular medicine physician's assistant thinks that she may have lymphedema, not likely PVD.  She was referred to an occupational therapist that works in a lymphedema clinic at [facility] and had 12 sessions which have \"really helped\".  She continues to wear thigh-high compression stockings regularly and stays active.  "

## 2021-06-28 NOTE — ASSESSMENT & PLAN NOTE
Ongoing.  Continues over-the-counter omega-3 supplement daily and healthy diet and exercise.  Due for updated annual labs in July 2021.

## 2021-06-28 NOTE — ASSESSMENT & PLAN NOTE
Chronic, stable.  Last ultrasound stable with no changes, no routine screening needed unless patient notices changes and mass or develops symptoms. Denies symptoms including mood changes, palpitations, fatigue, heat/cold intolerance, polydipsia, polyuria, diarrhea/constipation, abdominal pain, weight gain, skin changes, hair changes, hair loss, brittle nails, or edema.

## 2021-06-28 NOTE — ASSESSMENT & PLAN NOTE
Chronic, ongoing.  Due for updated annual labs in July 2021.  Would like a refill of terbinafine after labs completed as she still has fungal infection on left foot big toe.

## 2021-06-28 NOTE — ASSESSMENT & PLAN NOTE
Chronic, ongoing. Continues famotidine 20 mg/BID. Can have chocolate and soda, but has to limit. Denies cough, hoarseness, globus sensation, shortness of breath, sore throat, early satiety, unintentional weight loss, choking, dysphagia, persistent burning pain in chest or upper abdomen, nausea, vomiting, melena.

## 2021-06-28 NOTE — PROGRESS NOTES
"Chief Complaint   Patient presents with   • Annual Wellness Visit   • Medication Refill     terbinafine     HPI:  Shannan Dove is a 70 y.o. here for Medicare Annual Wellness Visit     Primary osteoarthritis of right shoulder  Chronic, ongoing.  Patient reports that her right shoulder does not hurt that much.  It will bother her if she lifts something heavy, notices clicking every once in a while.  She puts a Salonpas patch on at night.  She was prescribed meloxicam in the past for her right knee, is wondering if this is okay to take for her right shoulder in place of ibuprofen.    Osteopenia of neck of left femur  Chronic, ongoing.  Continues over-the-counter vitamin D and calcium supplements daily.  Due for updated DEXA scan.    Vitamin D insufficiency  Chronic, ongoing.  Continues vitamin D and calcium supplement daily.  Due for updated annual labs in July 2021.    OM (onychomycosis)  Chronic, ongoing.  Due for updated annual labs in July 2021.  Would like a refill of terbinafine after labs completed as she still has fungal infection on left foot big toe.    Mixed hyperlipidemia  Ongoing.  Continues over-the-counter omega-3 supplement daily and healthy diet and exercise.  Due for updated annual labs in July 2021.    Hyperglycemia  Resolved.    Gastroesophageal reflux disease without esophagitis  Chronic, ongoing. Continues famotidine 20 mg/BID. Can have chocolate and soda, but has to limit. Denies cough, hoarseness, globus sensation, shortness of breath, sore throat, early satiety, unintentional weight loss, choking, dysphagia, persistent burning pain in chest or upper abdomen, nausea, vomiting, melena.     Varicose vein of leg  Chronic, ongoing.  Reports that she had bilateral leg procedures in 2017 and 2018 and felt great for 2019.  In March 2020 when she began having bilateral lower extremity heaviness, swelling, pain.  She followed up with vascular medicine was told that they have done \"everything that " "insurance approves\".  Patient states that vascular medicine physician's assistant thinks that she may have lymphedema, not likely PVD.  She was referred to an occupational therapist that works in a lymphedema clinic at [facility] and had 12 sessions which have \"really helped\".  She continues to wear thigh-high compression stockings regularly and stays active.    Thyroid mass  Chronic, stable.  Last ultrasound stable with no changes, no routine screening needed unless patient notices changes and mass or develops symptoms. Denies symptoms including mood changes, palpitations, fatigue, heat/cold intolerance, polydipsia, polyuria, diarrhea/constipation, abdominal pain, weight gain, skin changes, hair changes, hair loss, brittle nails, or edema.      Patient Active Problem List    Diagnosis Date Noted   • Bilateral lower extremity edema 03/03/2021   • Primary osteoarthritis of right shoulder 03/03/2021   • Varicose vein of leg 08/10/2018   • Gastroesophageal reflux disease without esophagitis 12/22/2017   • Thyroid mass 06/23/2017   • Vitamin D insufficiency 06/22/2017   • OM (onychomycosis) 06/22/2017   • AK (actinic keratosis) 06/23/2016   • Chronic pain of right knee 06/23/2016   • Mixed hyperlipidemia 06/23/2016   • Right-sided thoracic back pain 06/23/2016   • Heart murmur    • Osteopenia of neck of left femur      Current Outpatient Medications   Medication Sig Dispense Refill   • meloxicam (MOBIC) 15 MG tablet TAKE 1 TABLET BY MOUTH EVERY DAY 90 Tab 3   • triamcinolone acetonide (KENALOG) 0.1 % Cream Apply 1 Application to affected area(s) 2 times a day as needed. 1 Tube 11   • Omega-3 Fatty Acids (OMEGA 3 500 PO) Take 1,000 mg by mouth.     • famotidine (PEPCID) 20 MG Tab Take 1 Tab by mouth 2 times a day. 60 Tab 0   • Calcium Carbonate 600 MG Tab Take 1,200 mg by mouth every day.     • Cholecalciferol (VITAMIN D3) 5000 units Tab Take 5,000 Units by mouth every day.       No current " facility-administered medications for this visit.          Current supplements as per medication list.     Allergies: Pcn [penicillins]    Current social contact/activities: Sewing, Gardening     She  reports that she has never smoked. She has never used smokeless tobacco. She reports that she does not drink alcohol and does not use drugs.  Counseling given: Yes    DPA/Advanced Directive:  Patient has Living Will on file.     ROS:    Gait: Uses no assistive device  Ostomy: No  Other tubes: No  Amputations: No  Chronic oxygen use: No  Last eye exam: 2021  Wears hearing aids: No   : Denies any urinary leakage during the last 6 months    Screening:  Depression Screening    Little interest or pleasure in doing things?  0 - not at all  Feeling down, depressed , or hopeless? 0 - not at all  Patient Health Questionnaire Score: 0     If depressive symptoms identified deferred to follow up visit unless specifically addressed in assessment and plan.    Interpretation of PHQ-9 Total Score   Score Severity   1-4 No Depression   5-9 Mild Depression   10-14 Moderate Depression   15-19 Moderately Severe Depression   20-27 Severe Depression    Screening for Cognitive Impairment    Three Minute Recall (captain, garden, picture) 2/3    Oj clock face with all 12 numbers and set the hands to show 5 past 8.  Yes    Cognitive concerns identified deferred for follow up unless specifically addressed in assessment and plan.    Fall Risk Assessment    Has the patient had two or more falls in the last year or any fall with injury in the last year?  No    Safety Assessment    Throw rugs on floor.  Yes  Handrails on all stairs.  Yes  Good lighting in all hallways.  Yes  Difficulty hearing.  No  Patient counseled about all safety risks that were identified.    Functional Assessment ADLs    Are there any barriers preventing you from cooking for yourself or meeting nutritional needs?  No.    Are there any barriers preventing you from driving  safely or obtaining transportation?  No.    Are there any barriers preventing you from using a telephone or calling for help?  No.    Are there any barriers preventing you from shopping?  No.    Are there any barriers preventing you from taking care of your own finances?  No.    Are there any barriers preventing you from managing your medications?  No.    Are there any barriers preventing you from showering, bathing or dressing yourself?  No.    Are you currently engaging in any exercise or physical activity?  Yes.     What is your perception of your health?  Good.    Health Maintenance Summary                BONE DENSITY Overdue 5/17/2021      Done 5/17/2019 DS-BONE DENSITY STUDY (DEXA)     Patient has more history with this topic...    COLONOSCOPY Next Due 8/3/2021      Done 8/3/2016 REFERRAL TO GI FOR COLONOSCOPY    MAMMOGRAM Next Due 9/4/2021      Done 9/4/2020 MA-SCREENING MAMMO BILAT W/TOMOSYNTHESIS W/CAD     Patient has more history with this topic...    Annual Wellness Visit Next Due 6/29/2022      Done 6/28/2021 Visit Dx: Medicare annual wellness visit, subsequent     Patient has more history with this topic...    IMM DTaP/Tdap/Td Vaccine Next Due 8/20/2030      Done 8/20/2020 Imm Admin: Tdap Vaccine     Patient has more history with this topic...        Patient Care Team:  NORRIS GomezRDESIREE as PCP - General (Family Medicine)  Becky Mcconnell [facility] as Medical Home Care Manager (Dentistry)  Markie Dumont OD as Medical Home Care Manager (Optometry)  ELLEN Sanches as Consulting Physician (Family Medicine)  Amy R. Schoening, P.A. as Consulting Physician (Dermatology)  ELLEN Franklin as Consulting Physician (Gastroenterology)  Ruben VALLADARES D.O. as Consulting Physician (Radiology)    Social History     Tobacco Use   • Smoking status: Never Smoker   • Smokeless tobacco: Never Used   Vaping Use   • Vaping Use: Never used   Substance Use Topics   • Alcohol  "use: No   • Drug use: No     Family History   Problem Relation Age of Onset   • Heart Disease Mother         tachycardia, pacemaker   • Cancer Mother         colon ca - resected   • Psychiatric Illness Father         suicide   • No Known Problems Brother    • Cancer Brother         Colon cancer   • Stroke Brother    • Alcohol/Drug Brother         marijuana   • Hypertension Brother    • Hypertension Maternal Aunt    • Alcohol/Drug Maternal Uncle    • Asthma Maternal Grandmother    • Heart Disease Maternal Grandmother    • Alcohol/Drug Maternal Grandfather    • Heart Disease Paternal Grandmother    • No Known Problems Paternal Grandfather      She  has a past medical history of Gyn exam, Heart murmur, Hyperglycemia (12/27/2018), Hyperlipidemia, Leucocytosis (12/27/2018), Pap smear, Post menopausal problems, Varicose veins of both legs with edema, and Vitamin D deficiency.   Past Surgical History:   Procedure Laterality Date   • TONSILLECTOMY       Exam:   /70 (BP Location: Left arm, Patient Position: Sitting, BP Cuff Size: Adult)   Pulse 77   Temp 36.7 °C (98 °F) (Temporal)   Ht 1.651 m (5' 5\")   Wt 77.6 kg (171 lb)   SpO2 95%  Body mass index is 28.46 kg/m².    Hearing excellent.    Dentition good  Alert, oriented in no acute distress.  Eye contact is good, speech goal directed, affect calm    Assessment and Plan. The following treatment and monitoring plan is recommended:    1. Medicare annual wellness visit, subsequent  - Subsequent Annual Wellness Visit - Includes PPPS ()  - DS-BONE DENSITY STUDY (DEXA); Future  - CBC WITH DIFFERENTIAL; Future  - Comp Metabolic Panel; Future  - Lipid Profile; Future  - TSH WITH REFLEX TO FT4; Future  - VITAMIN D,25 HYDROXY; Future  - MA-SCREENING MAMMO BILAT W/TOMOSYNTHESIS W/CAD; Future  - REFERRAL TO GI FOR COLONOSCOPY    2. Primary osteoarthritis of right shoulder  Chronic, ongoing.  May use meloxicam 1 time daily as needed.  Do not take other NSAIDs including " naproxen or ibuprofen.  Continue famotidine 20 mg twice daily.    3. OM (onychomycosis)  Chronic, ongoing.  Due for updated annual labs in July 2021.  We will refill terbinafine as long as LFTs are normal range.  - Comp Metabolic Panel; Future    4. Mixed hyperlipidemia  Chronic, ongoing.  Continue over-the-counter omega-3 fatty acids daily.  Due for updated annual labs in July 2021.  - Comp Metabolic Panel; Future  - Lipid Profile; Future    5. Overweight (BMI 25.0-29.9)  Chronic, ongoing.  Due for updated annual labs in July 2021.  - CBC WITH DIFFERENTIAL; Future  - Comp Metabolic Panel; Future  - Lipid Profile; Future  - TSH WITH REFLEX TO FT4; Future    6. Gastroesophageal reflux disease without esophagitis  Chronic, ongoing.  Continue famotidine 20 mg twice daily, does not need a refill at this time.  Due for updated annual labs in July 2021.  - CBC WITH DIFFERENTIAL; Future  - Comp Metabolic Panel; Future  - TSH WITH REFLEX TO FT4; Future    7. Varicose veins of both lower extremities, unspecified whether complicated  Chronic, ongoing.  Continue to follow with vascular medicine and lymphedema clinic as directed.  Continue compression stockings and exercise.  Due for updated annual labs in July 2021.  - CBC WITH DIFFERENTIAL; Future  - Lipid Profile; Future  - TSH WITH REFLEX TO FT4; Future    8. Hyperglycemia  Chronic, stable.  Due for annual labs in July 2021.  - Comp Metabolic Panel; Future    9. Thyroid mass  Chronic, stable.  Patient remains asymptomatic, no further ultrasound for screening unless patient becomes symptomatic.  Due for updated annual labs in July 2021.  - TSH WITH REFLEX TO FT4; Future    10. Vitamin D insufficiency  Chronic, ongoing.  Continue vitamin D and calcium supplement daily.  Due for updated annual labs in July 2021.  - VITAMIN D,25 HYDROXY; Future    11. Osteopenia of neck of left femur  12. Postmenopausal  Chronic, ongoing.  Due for updated DEXA.  Encouraged weightbearing  exercises 150 minutes weekly and continue vitamin D and calcium supplement daily.  - DS-BONE DENSITY STUDY (DEXA); Future    13. Routine health maintenance  - Subsequent Annual Wellness Visit - Includes PPPS ()  - DS-BONE DENSITY STUDY (DEXA); Future  - CBC WITH DIFFERENTIAL; Future  - Comp Metabolic Panel; Future  - Lipid Profile; Future  - TSH WITH REFLEX TO FT4; Future  - VITAMIN D,25 HYDROXY; Future  - MA-SCREENING MAMMO BILAT W/TOMOSYNTHESIS W/CAD; Future  - REFERRAL TO GI FOR COLONOSCOPY    14. Screening for colorectal cancer  Due for repeat colonoscopy after 8/3/2021.  - REFERRAL TO GI FOR COLONOSCOPY    15. Encounter for screening mammogram for breast cancer  Due for screening mammogram after 9/4/2021.  - MA-SCREENING MAMMO BILAT W/TOMOSYNTHESIS W/CAD; Future     Services suggested: No services needed at this time  Health Care Screening: Age-appropriate preventive services recommended by USPTF and ACIP covered by Medicare were discussed today. Services ordered if indicated and agreed upon by the patient.  Referrals offered: Community-based lifestyle interventions to reduce health risks and promote self-management and wellness, fall prevention, nutrition, physical activity, tobacco-use cessation, weight loss, and mental health services as per orders if indicated.    Discussion today about general wellness and lifestyle habits:    · Prevent falls and reduce trip hazards; Cautioned about securing or removing rugs.  · Have a working fire alarm and carbon monoxide detector;   · Engage in regular physical activity and social activities     Follow-up: Return in about 1 year (around 6/28/2022) for HC/PCP Annual Wellness Visit- Medicare, As needed.    Please note that this dictation was created using voice recognition software. I have worked with consultants from the vendor as well as technical experts from [employer] to optimize the interface. I have made every reasonable attempt to correct obvious errors,  but I expect that there are errors of grammar and possibly content that I did not discover before finalizing the note.

## 2021-06-28 NOTE — ASSESSMENT & PLAN NOTE
Chronic, ongoing.  Continues vitamin D and calcium supplement daily.  Due for updated annual labs in July 2021.

## 2021-06-28 NOTE — ASSESSMENT & PLAN NOTE
Chronic, ongoing.  Patient reports that her right shoulder does not hurt that much.  It will bother her if she lifts something heavy, notices clicking every once in a while.  She puts a Salonpas patch on at night.  She was prescribed meloxicam in the past for her right knee, is wondering if this is okay to take for her right shoulder in place of ibuprofen.

## 2021-07-17 ENCOUNTER — HOSPITAL ENCOUNTER (OUTPATIENT)
Dept: LAB | Facility: MEDICAL CENTER | Age: 71
End: 2021-07-17
Attending: NURSE PRACTITIONER
Payer: MEDICARE

## 2021-07-17 DIAGNOSIS — E55.9 VITAMIN D INSUFFICIENCY: ICD-10-CM

## 2021-07-17 DIAGNOSIS — E66.3 OVERWEIGHT (BMI 25.0-29.9): ICD-10-CM

## 2021-07-17 DIAGNOSIS — E78.2 MIXED HYPERLIPIDEMIA: ICD-10-CM

## 2021-07-17 DIAGNOSIS — Z00.00 MEDICARE ANNUAL WELLNESS VISIT, SUBSEQUENT: ICD-10-CM

## 2021-07-17 DIAGNOSIS — K21.9 GASTROESOPHAGEAL REFLUX DISEASE WITHOUT ESOPHAGITIS: ICD-10-CM

## 2021-07-17 DIAGNOSIS — Z00.00 ROUTINE HEALTH MAINTENANCE: ICD-10-CM

## 2021-07-17 DIAGNOSIS — I83.93 VARICOSE VEINS OF BOTH LOWER EXTREMITIES, UNSPECIFIED WHETHER COMPLICATED: ICD-10-CM

## 2021-07-17 DIAGNOSIS — E07.9 THYROID MASS: ICD-10-CM

## 2021-07-17 DIAGNOSIS — R73.9 HYPERGLYCEMIA: ICD-10-CM

## 2021-07-17 DIAGNOSIS — B35.1 OM (ONYCHOMYCOSIS): ICD-10-CM

## 2021-07-17 LAB
25(OH)D3 SERPL-MCNC: 43 NG/ML (ref 30–100)
ALBUMIN SERPL BCP-MCNC: 4.7 G/DL (ref 3.2–4.9)
ALBUMIN/GLOB SERPL: 1.9 G/DL
ALP SERPL-CCNC: 57 U/L (ref 30–99)
ALT SERPL-CCNC: 28 U/L (ref 2–50)
ANION GAP SERPL CALC-SCNC: 12 MMOL/L (ref 7–16)
AST SERPL-CCNC: 21 U/L (ref 12–45)
BASOPHILS # BLD AUTO: 0.4 % (ref 0–1.8)
BASOPHILS # BLD: 0.02 K/UL (ref 0–0.12)
BILIRUB SERPL-MCNC: 0.6 MG/DL (ref 0.1–1.5)
BUN SERPL-MCNC: 21 MG/DL (ref 8–22)
CALCIUM SERPL-MCNC: 9.3 MG/DL (ref 8.5–10.5)
CHLORIDE SERPL-SCNC: 105 MMOL/L (ref 96–112)
CHOLEST SERPL-MCNC: 242 MG/DL (ref 100–199)
CO2 SERPL-SCNC: 24 MMOL/L (ref 20–33)
CREAT SERPL-MCNC: 0.57 MG/DL (ref 0.5–1.4)
EOSINOPHIL # BLD AUTO: 0.3 K/UL (ref 0–0.51)
EOSINOPHIL NFR BLD: 5.4 % (ref 0–6.9)
ERYTHROCYTE [DISTWIDTH] IN BLOOD BY AUTOMATED COUNT: 40.3 FL (ref 35.9–50)
FASTING STATUS PATIENT QL REPORTED: NORMAL
GLOBULIN SER CALC-MCNC: 2.5 G/DL (ref 1.9–3.5)
GLUCOSE SERPL-MCNC: 97 MG/DL (ref 65–99)
HCT VFR BLD AUTO: 40.9 % (ref 37–47)
HDLC SERPL-MCNC: 47 MG/DL
HGB BLD-MCNC: 14.2 G/DL (ref 12–16)
IMM GRANULOCYTES # BLD AUTO: 0.01 K/UL (ref 0–0.11)
IMM GRANULOCYTES NFR BLD AUTO: 0.2 % (ref 0–0.9)
LDLC SERPL CALC-MCNC: 158 MG/DL
LYMPHOCYTES # BLD AUTO: 2.46 K/UL (ref 1–4.8)
LYMPHOCYTES NFR BLD: 44.2 % (ref 22–41)
MCH RBC QN AUTO: 30.3 PG (ref 27–33)
MCHC RBC AUTO-ENTMCNC: 34.7 G/DL (ref 33.6–35)
MCV RBC AUTO: 87.2 FL (ref 81.4–97.8)
MONOCYTES # BLD AUTO: 0.47 K/UL (ref 0–0.85)
MONOCYTES NFR BLD AUTO: 8.5 % (ref 0–13.4)
NEUTROPHILS # BLD AUTO: 2.3 K/UL (ref 2–7.15)
NEUTROPHILS NFR BLD: 41.3 % (ref 44–72)
NRBC # BLD AUTO: 0 K/UL
NRBC BLD-RTO: 0 /100 WBC
PLATELET # BLD AUTO: 288 K/UL (ref 164–446)
PMV BLD AUTO: 10 FL (ref 9–12.9)
POTASSIUM SERPL-SCNC: 4 MMOL/L (ref 3.6–5.5)
PROT SERPL-MCNC: 7.2 G/DL (ref 6–8.2)
RBC # BLD AUTO: 4.69 M/UL (ref 4.2–5.4)
SODIUM SERPL-SCNC: 141 MMOL/L (ref 135–145)
TRIGL SERPL-MCNC: 185 MG/DL (ref 0–149)
TSH SERPL DL<=0.005 MIU/L-ACNC: 1.54 UIU/ML (ref 0.38–5.33)
WBC # BLD AUTO: 5.6 K/UL (ref 4.8–10.8)

## 2021-07-17 PROCEDURE — 85025 COMPLETE CBC W/AUTO DIFF WBC: CPT

## 2021-07-17 PROCEDURE — 82306 VITAMIN D 25 HYDROXY: CPT

## 2021-07-17 PROCEDURE — 36415 COLL VENOUS BLD VENIPUNCTURE: CPT

## 2021-07-17 PROCEDURE — 84443 ASSAY THYROID STIM HORMONE: CPT

## 2021-07-17 PROCEDURE — 80061 LIPID PANEL: CPT

## 2021-07-17 PROCEDURE — 80053 COMPREHEN METABOLIC PANEL: CPT

## 2021-07-19 DIAGNOSIS — B35.1 OM (ONYCHOMYCOSIS): ICD-10-CM

## 2021-07-19 RX ORDER — TERBINAFINE HYDROCHLORIDE 250 MG/1
250 TABLET ORAL DAILY
Qty: 90 TABLET | Refills: 0 | Status: SHIPPED | OUTPATIENT
Start: 2021-07-19 | End: 2021-08-17 | Stop reason: SDUPTHER

## 2021-07-19 NOTE — PROGRESS NOTES
Requested Prescriptions     Signed Prescriptions Disp Refills   • terbinafine (LAMISIL) 250 MG Tab 90 tablet 0     Sig: Take 1 tablet by mouth every day for 90 days.       MAYCOL Gomez.

## 2021-08-10 ENCOUNTER — PATIENT MESSAGE (OUTPATIENT)
Dept: HEALTH INFORMATION MANAGEMENT | Facility: OTHER | Age: 71
End: 2021-08-10

## 2021-08-16 ENCOUNTER — TELEPHONE (OUTPATIENT)
Dept: MEDICAL GROUP | Facility: PHYSICIAN GROUP | Age: 71
End: 2021-08-16

## 2021-08-16 NOTE — TELEPHONE ENCOUNTER
Future Appointments       Provider Department Center    8/17/2021 1:30 PM ELLEN Gomez [facility]    9/7/2021 8:35 AM [facility] BD 1 [facility]    9/7/2021 9:30 AM [facility] MG 1 [facility]    6/30/2022 9:00 AM ELLEN Goemz [facility]        ESTABLISHED PATIENT PRE-VISIT PLANNING     Patient was NOT contacted to complete PVP.     Note: Patient will not be contacted if there is no indication to call.     1.  Reviewed notes from the last few office visits within the medical group: Yes, LOV 06/28/2021    2.  If any orders were placed at last visit or intended to be done for this visit (i.e. 6 mos follow-up), do we have Results/Consult Notes?         •  Labs - Labs were not ordered at last office visit.  Note: If patient appointment is for lab review and patient did not complete labs, check with provider if OK to reschedule patient until labs completed.       •  Imaging - Imaging ordered, NOT completed. Patient advised to complete prior to next appointment.  Patient has both mammo and Dexa scheduled       •  Referrals - Referral ordered, patient has NOT been seen.    3. Is this appointment scheduled as a Hospital Follow-Up? No    4.  Immunizations were updated in Epic using Reconcile Outside Information activity? Yes    5.  Patient is due for the following Health Maintenance Topics:   Health Maintenance Due   Topic Date Due   • BONE DENSITY  05/17/2021   • COLORECTAL CANCER SCREENING  08/03/2021   • MAMMOGRAM  09/04/2021       - Patient has completed Patient is up to date on all vaccines Immunization(s) per [employer]. Chart has been updated.    6.  AHA (Pulse8) form printed for Provider? N/A

## 2021-08-17 ENCOUNTER — OFFICE VISIT (OUTPATIENT)
Dept: MEDICAL GROUP | Facility: PHYSICIAN GROUP | Age: 71
End: 2021-08-17
Payer: MEDICARE

## 2021-08-17 VITALS
HEIGHT: 65 IN | HEART RATE: 86 BPM | BODY MASS INDEX: 27.82 KG/M2 | WEIGHT: 167 LBS | DIASTOLIC BLOOD PRESSURE: 66 MMHG | TEMPERATURE: 98.7 F | SYSTOLIC BLOOD PRESSURE: 130 MMHG | OXYGEN SATURATION: 94 % | RESPIRATION RATE: 14 BRPM

## 2021-08-17 DIAGNOSIS — B35.1 OM (ONYCHOMYCOSIS): ICD-10-CM

## 2021-08-17 DIAGNOSIS — L03.031 PARONYCHIA OF GREAT TOE, RIGHT: ICD-10-CM

## 2021-08-17 PROCEDURE — 99214 OFFICE O/P EST MOD 30 MIN: CPT | Performed by: NURSE PRACTITIONER

## 2021-08-17 RX ORDER — CEPHALEXIN 500 MG/1
500 CAPSULE ORAL 4 TIMES DAILY
Qty: 16 CAPSULE | Refills: 0 | Status: SHIPPED | OUTPATIENT
Start: 2021-08-17 | End: 2021-09-07

## 2021-08-17 RX ORDER — TERBINAFINE HYDROCHLORIDE 250 MG/1
250 TABLET ORAL DAILY
Qty: 90 TABLET | Refills: 0 | Status: SHIPPED | OUTPATIENT
Start: 2021-08-17 | End: 2022-03-24 | Stop reason: SDUPTHER

## 2021-08-17 ASSESSMENT — FIBROSIS 4 INDEX: FIB4 SCORE: 0.95

## 2021-08-17 NOTE — ASSESSMENT & PLAN NOTE
New problem to examiner.  Patient reports that she continues to have intermittent issues with ingrown toenails and onychomycosis, specifically to her left foot great toe.  Usually, the patient is able to pull the skin away from the nail and it will get better.  3 weeks ago, she did this and she was having pain, swelling, redness.  Describes the pain as a burning sensation.  She applied topical Neosporin and a Band-Aid, which cause irritant dermatitis to the left foot great toe.  Denies any drainage, reports that the red area on the medial aspect of the great toenail appears to be a blister, does move if pressure is placed, and will cause bleeding underneath.

## 2021-08-18 NOTE — ASSESSMENT & PLAN NOTE
Chronic, ongoing.  Up-to-date on labs completed in July 2021.  Requesting refill of terbinafine to be sent to correct pharmacy, she did not  the previous prescription.  Continues to have fungal infection on left foot great toe.

## 2021-08-18 NOTE — PROGRESS NOTES
CC:   Chief Complaint   Patient presents with   • Toe Pain     3 X weeks    • Medication Refill     Terbinafine      HISTORY OF THE PRESENT ILLNESS: Patient is a 70 y.o. female. This pleasant patient is here today to request new prescription for terbinafine and discuss left foot great toe pain present for 3 weeks.    Health Maintenance: Completed    Paronychia of great toe, right  New problem to examiner.  Patient reports that she continues to have intermittent issues with ingrown toenails and onychomycosis, specifically to her left foot great toe.  Usually, the patient is able to pull the skin away from the nail and it will get better.  3 weeks ago, she did this and she was having pain, swelling, redness.  Describes the pain as a burning sensation.  She applied topical Neosporin and a Band-Aid, which cause irritant dermatitis to the left foot great toe.  Denies any drainage, reports that the red area on the medial aspect of the great toenail appears to be a blister, does move if pressure is placed, and will cause bleeding underneath.    OM (onychomycosis)  Chronic, ongoing.  Up-to-date on labs completed in July 2021.  Requesting refill of terbinafine to be sent to correct pharmacy, she did not  the previous prescription.  Continues to have fungal infection on left foot great toe.    Allergies: Pcn [penicillins]  Current Outpatient Medications Ordered in Epic   Medication Sig Dispense Refill   • cephALEXin (KEFLEX) 500 MG Cap Take 1 Capsule by mouth 4 times a day for 4 days. 16 Capsule 0   • terbinafine (LAMISIL) 250 MG Tab Take 1 Tablet by mouth every day for 90 days. 90 Tablet 0   • meloxicam (MOBIC) 15 MG tablet TAKE 1 TABLET BY MOUTH EVERY DAY 90 Tab 3   • triamcinolone acetonide (KENALOG) 0.1 % Cream Apply 1 Application to affected area(s) 2 times a day as needed. 1 Tube 11   • Omega-3 Fatty Acids (OMEGA 3 500 PO) Take 1,000 mg by mouth.     • famotidine (PEPCID) 20 MG Tab Take 1 Tab by mouth 2 times a  day. 60 Tab 0   • Calcium Carbonate 600 MG Tab Take 1,200 mg by mouth every day.     • Cholecalciferol (VITAMIN D3) 5000 units Tab Take 5,000 Units by mouth every day.       No current [facility]-ordered facility-administered medications on file.     Past Medical History:   Diagnosis Date   • Gyn exam    • Heart murmur    • Hyperglycemia 12/27/2018   • Hyperlipidemia    • Leucocytosis 12/27/2018   • Pap smear    • Post menopausal problems    • Varicose veins of both legs with edema    • Vitamin D deficiency      Past Surgical History:   Procedure Laterality Date   • TONSILLECTOMY       Social History     Tobacco Use   • Smoking status: Never Smoker   • Smokeless tobacco: Never Used   Vaping Use   • Vaping Use: Never used   Substance Use Topics   • Alcohol use: No   • Drug use: No     Social History     Social History Narrative   • Not on file     Family History   Problem Relation Age of Onset   • Heart Disease Mother         tachycardia, pacemaker   • Cancer Mother         colon ca - resected   • Psychiatric Illness Father         suicide   • No Known Problems Brother    • Cancer Brother         Colon cancer   • Stroke Brother    • Alcohol/Drug Brother         marijuana   • Hypertension Brother    • Hypertension Maternal Aunt    • Alcohol/Drug Maternal Uncle    • Asthma Maternal Grandmother    • Heart Disease Maternal Grandmother    • Alcohol/Drug Maternal Grandfather    • Heart Disease Paternal Grandmother    • No Known Problems Paternal Grandfather      ROS:   Constitutional: No fevers, chills, malaise/fatigue.  Eyes: No eye pain.  ENT: No sore throat, congestion.   Resp: No cough, shortness of breath.  CV: No chest pain, leg swelling, palpitations.  GI: No nausea/vomiting, abdominal pain, constipation, diarrhea.  : No dysuria, hematuria.  MSK: No weakness.  Skin: + Onychomycosis left foot great toe, ingrown toenail with redness.  Neuro: No dizziness, weakness, headaches.  Psych: No suicidal ideations.    All  "remaining systems reviewed and found to be negative, except as stated above.      Exam: /66 (BP Location: Right arm, Patient Position: Sitting, BP Cuff Size: Adult)   Pulse 86   Temp 37.1 °C (98.7 °F) (Temporal)   Resp 14   Ht 1.651 m (5' 5\")   Wt 75.8 kg (167 lb)   SpO2 94%  Body mass index is 27.79 kg/m².    General: Well nourished, well developed female in NAD, awake and conversant.  Eyes: Normal conjunctiva, anicteric.  Round symmetrical pupils.  ENT: Hearing grossly intact.  No nasal discharge.  Neck: Neck is supple.  No masses or thyromegaly.  CV: No lower extremity edema.  Respiratory: Respirations are nonlabored.  No wheezing.  Abdomen: Non-Distended.  Skin: Warm. Swelling, erythema, without drainage to medial left foot great toenail.  MSK: Normal ambulation.  No clubbing or cyanosis.  Neuro: Sensation and CN II-XII grossly normal.  Psych: Alert and oriented.  Cooperative, appropriate mood and affect, normal judgment.     Assessment/Plan:  1. Paronychia of great toe, right  New problem to examiner, symptoms began approximately 3 weeks ago.  Patient to start cephalexin 500 mg 4 times daily for 4 days, patient with history of penicillin allergy.  Discussed possible allergic reaction, advised patient to stop taking medication if any allergic reaction symptoms occur and notify provider, patient understands.  Referral to podiatry for evaluation and treatment.  - cephALEXin (KEFLEX) 500 MG Cap; Take 1 Capsule by mouth 4 times a day for 4 days.  Dispense: 16 Capsule; Refill: 0  - REFERRAL TO PODIATRY    2. OM (onychomycosis)  Chronic, ongoing.  New prescription for terbinafine 250 mg daily for 90 days sent to pharmacy, last prescription was sent to incorrect pharmacy and she did not pick it up.  Referral to podiatry for evaluation and treatment.  - REFERRAL TO PODIATRY  - terbinafine (LAMISIL) 250 MG Tab; Take 1 Tablet by mouth every day for 90 days.  Dispense: 90 Tablet; Refill: 0    Educated in proper " administration of medication(s) ordered today including safety, possible SE, risks, benefits, rationale and alternatives to therapy.   Supportive care, differential diagnoses, and indications for immediate follow-up discussed with patient.    Pathogenesis of diagnosis discussed including typical length and natural progression.    Instructed to return to clinic or nearest emergency department for any change in condition, further concerns, or worsening of symptoms.  Patient states understanding of the plan of care and discharge instructions.    Return if symptoms worsen or fail to improve.    Please note that this dictation was created using voice recognition software. I have made every reasonable attempt to correct obvious errors, but I expect that there are errors of grammar and possibly content that I did not discover before finalizing the note.

## 2021-09-06 DIAGNOSIS — L03.031 PARONYCHIA OF GREAT TOE, RIGHT: ICD-10-CM

## 2021-09-07 ENCOUNTER — APPOINTMENT (OUTPATIENT)
Dept: RADIOLOGY | Facility: MEDICAL CENTER | Age: 71
End: 2021-09-07
Attending: NURSE PRACTITIONER
Payer: MEDICARE

## 2021-09-07 RX ORDER — CEPHALEXIN 500 MG/1
500 CAPSULE ORAL 4 TIMES DAILY
Qty: 16 CAPSULE | Refills: 0 | Status: SHIPPED | OUTPATIENT
Start: 2021-09-07 | End: 2021-09-11

## 2021-09-07 NOTE — TELEPHONE ENCOUNTER
Requested Prescriptions     Pending Prescriptions Disp Refills   • cephALEXin (KEFLEX) 500 MG Cap [Pharmacy Med Name: CEPHALEXIN 500 MG CAPSULE] 16 Capsule 0     Sig: TAKE 1 CAPSULE BY MOUTH 4 TIMES A DAY FOR 4 DAYS.       MAYCOL Gomez.

## 2021-09-27 ENCOUNTER — APPOINTMENT (RX ONLY)
Dept: URBAN - METROPOLITAN AREA CLINIC 22 | Facility: CLINIC | Age: 71
Setting detail: DERMATOLOGY
End: 2021-09-27

## 2021-09-27 DIAGNOSIS — L81.4 OTHER MELANIN HYPERPIGMENTATION: ICD-10-CM

## 2021-09-27 DIAGNOSIS — L82.1 OTHER SEBORRHEIC KERATOSIS: ICD-10-CM

## 2021-09-27 DIAGNOSIS — D22 MELANOCYTIC NEVI: ICD-10-CM

## 2021-09-27 DIAGNOSIS — L85.1 ACQUIRED KERATOSIS [KERATODERMA] PALMARIS ET PLANTARIS: ICD-10-CM

## 2021-09-27 DIAGNOSIS — Z71.89 OTHER SPECIFIED COUNSELING: ICD-10-CM

## 2021-09-27 DIAGNOSIS — L60.8 OTHER NAIL DISORDERS: ICD-10-CM

## 2021-09-27 DIAGNOSIS — L57.0 ACTINIC KERATOSIS: ICD-10-CM

## 2021-09-27 DIAGNOSIS — I83.9 ASYMPTOMATIC VARICOSE VEINS OF LOWER EXTREMITIES: ICD-10-CM

## 2021-09-27 PROBLEM — I83.93 ASYMPTOMATIC VARICOSE VEINS OF BILATERAL LOWER EXTREMITIES: Status: ACTIVE | Noted: 2021-09-27

## 2021-09-27 PROBLEM — D22.5 MELANOCYTIC NEVI OF TRUNK: Status: ACTIVE | Noted: 2021-09-27

## 2021-09-27 PROCEDURE — 17003 DESTRUCT PREMALG LES 2-14: CPT

## 2021-09-27 PROCEDURE — ? LIQUID NITROGEN

## 2021-09-27 PROCEDURE — 17000 DESTRUCT PREMALG LESION: CPT

## 2021-09-27 PROCEDURE — 99213 OFFICE O/P EST LOW 20 MIN: CPT | Mod: 25

## 2021-09-27 PROCEDURE — ? COUNSELING

## 2021-09-27 PROCEDURE — ? SUNSCREEN TREATMENT REGIMEN

## 2021-09-27 PROCEDURE — ? ADDITIONAL NOTES

## 2021-09-27 ASSESSMENT — LOCATION SIMPLE DESCRIPTION DERM
LOCATION SIMPLE: LEFT PRETIBIAL REGION
LOCATION SIMPLE: LEFT GREAT TOE
LOCATION SIMPLE: LEFT FOREARM
LOCATION SIMPLE: LEFT PLANTAR SURFACE
LOCATION SIMPLE: RIGHT PLANTAR SURFACE
LOCATION SIMPLE: UPPER BACK
LOCATION SIMPLE: RIGHT FOREARM
LOCATION SIMPLE: LEFT CHEEK
LOCATION SIMPLE: INFERIOR FOREHEAD
LOCATION SIMPLE: RIGHT PRETIBIAL REGION
LOCATION SIMPLE: RIGHT GREAT TOE
LOCATION SIMPLE: NOSE

## 2021-09-27 ASSESSMENT — LOCATION DETAILED DESCRIPTION DERM
LOCATION DETAILED: LEFT GREAT TOENAIL
LOCATION DETAILED: RIGHT GREAT TOENAIL
LOCATION DETAILED: RIGHT MEDIAL PLANTAR MIDFOOT
LOCATION DETAILED: INFERIOR THORACIC SPINE
LOCATION DETAILED: NASAL DORSUM
LOCATION DETAILED: LEFT VENTRAL PROXIMAL FOREARM
LOCATION DETAILED: SUPERIOR THORACIC SPINE
LOCATION DETAILED: LEFT PROXIMAL PRETIBIAL REGION
LOCATION DETAILED: LEFT LATERAL MANDIBULAR CHEEK
LOCATION DETAILED: LEFT MEDIAL PLANTAR MIDFOOT
LOCATION DETAILED: INFERIOR MID FOREHEAD
LOCATION DETAILED: RIGHT PROXIMAL PRETIBIAL REGION
LOCATION DETAILED: RIGHT VENTRAL PROXIMAL FOREARM

## 2021-09-27 ASSESSMENT — LOCATION ZONE DERM
LOCATION ZONE: NOSE
LOCATION ZONE: FACE
LOCATION ZONE: LEG
LOCATION ZONE: FEET
LOCATION ZONE: TRUNK
LOCATION ZONE: ARM
LOCATION ZONE: TOENAIL

## 2021-09-27 NOTE — PROCEDURE: ADDITIONAL NOTES
Render Risk Assessment In Note?: no
Additional Notes: Pt currently under tx with podiatrist. Currently using a topical Rx prescribed by podiatrist.
Detail Level: Detailed

## 2021-10-05 ENCOUNTER — HOSPITAL ENCOUNTER (OUTPATIENT)
Dept: RADIOLOGY | Facility: MEDICAL CENTER | Age: 71
End: 2021-10-05
Attending: NURSE PRACTITIONER
Payer: MEDICARE

## 2021-10-05 DIAGNOSIS — Z00.00 ROUTINE HEALTH MAINTENANCE: ICD-10-CM

## 2021-10-05 DIAGNOSIS — Z00.00 MEDICARE ANNUAL WELLNESS VISIT, SUBSEQUENT: ICD-10-CM

## 2021-10-05 DIAGNOSIS — Z78.0 POSTMENOPAUSAL: ICD-10-CM

## 2021-10-05 DIAGNOSIS — M85.852 OSTEOPENIA OF NECK OF LEFT FEMUR: ICD-10-CM

## 2021-10-05 PROCEDURE — 77080 DXA BONE DENSITY AXIAL: CPT

## 2021-10-13 ENCOUNTER — HOSPITAL ENCOUNTER (OUTPATIENT)
Dept: RADIOLOGY | Facility: MEDICAL CENTER | Age: 71
End: 2021-10-13
Attending: NURSE PRACTITIONER
Payer: MEDICARE

## 2021-10-13 DIAGNOSIS — Z00.00 ROUTINE HEALTH MAINTENANCE: ICD-10-CM

## 2021-10-13 DIAGNOSIS — Z00.00 MEDICARE ANNUAL WELLNESS VISIT, SUBSEQUENT: ICD-10-CM

## 2021-10-13 DIAGNOSIS — Z12.31 ENCOUNTER FOR SCREENING MAMMOGRAM FOR BREAST CANCER: ICD-10-CM

## 2021-10-13 PROCEDURE — 77063 BREAST TOMOSYNTHESIS BI: CPT

## 2021-12-22 DIAGNOSIS — G89.29 CHRONIC RIGHT-SIDED THORACIC BACK PAIN: ICD-10-CM

## 2021-12-22 DIAGNOSIS — M25.561 CHRONIC PAIN OF RIGHT KNEE: ICD-10-CM

## 2021-12-22 DIAGNOSIS — M54.6 CHRONIC RIGHT-SIDED THORACIC BACK PAIN: ICD-10-CM

## 2021-12-22 DIAGNOSIS — G89.29 CHRONIC PAIN OF RIGHT KNEE: ICD-10-CM

## 2021-12-22 RX ORDER — MELOXICAM 15 MG/1
15 TABLET ORAL
Qty: 90 TABLET | Refills: 1 | Status: SHIPPED | OUTPATIENT
Start: 2021-12-22 | End: 2023-10-03

## 2021-12-23 NOTE — TELEPHONE ENCOUNTER
Requested Prescriptions     Pending Prescriptions Disp Refills   • meloxicam (MOBIC) 15 MG tablet 90 Tablet 1     Sig: Take 1 Tablet by mouth 1 time a day as needed for Moderate Pain.       MAYCOL Gomez.

## 2022-01-31 ENCOUNTER — HOSPITAL ENCOUNTER (OUTPATIENT)
Dept: RADIOLOGY | Facility: MEDICAL CENTER | Age: 72
End: 2022-01-31
Attending: NURSE PRACTITIONER
Payer: MEDICARE

## 2022-01-31 ENCOUNTER — OFFICE VISIT (OUTPATIENT)
Dept: MEDICAL GROUP | Facility: PHYSICIAN GROUP | Age: 72
End: 2022-01-31
Payer: MEDICARE

## 2022-01-31 VITALS
TEMPERATURE: 98.6 F | RESPIRATION RATE: 12 BRPM | OXYGEN SATURATION: 95 % | HEART RATE: 87 BPM | SYSTOLIC BLOOD PRESSURE: 138 MMHG | WEIGHT: 173 LBS | HEIGHT: 65 IN | DIASTOLIC BLOOD PRESSURE: 78 MMHG | BODY MASS INDEX: 28.82 KG/M2

## 2022-01-31 DIAGNOSIS — M25.562 ACUTE PAIN OF LEFT KNEE: ICD-10-CM

## 2022-01-31 DIAGNOSIS — R60.0 EDEMA OF LEFT LOWER LEG: ICD-10-CM

## 2022-01-31 PROBLEM — M17.0 BILATERAL PRIMARY OSTEOARTHRITIS OF KNEE: Status: ACTIVE | Noted: 2022-01-31

## 2022-01-31 PROCEDURE — 73564 X-RAY EXAM KNEE 4 OR MORE: CPT | Mod: LT

## 2022-01-31 PROCEDURE — 99214 OFFICE O/P EST MOD 30 MIN: CPT | Performed by: NURSE PRACTITIONER

## 2022-01-31 ASSESSMENT — PATIENT HEALTH QUESTIONNAIRE - PHQ9: CLINICAL INTERPRETATION OF PHQ2 SCORE: 0

## 2022-01-31 ASSESSMENT — FIBROSIS 4 INDEX: FIB4 SCORE: 0.96

## 2022-01-31 NOTE — ASSESSMENT & PLAN NOTE
New to examiner.  Patient reports that she has had left knee pain present for 1 week without trauma or injury.  She had a right knee injury in June 2016 which is when she was prescribed meloxicam.  She has been using either meloxicam or ibuprofen for current left knee pain, icing, and taking hot shower.  States that when she is sitting or lying at rest she does not have pain, but when she stands and puts pressure on her left lower extremity or walking she is having generalized knee cap pain, posterior calf and thigh pain and posterior tibial pain.  She has been followed by a vein doctor for varicose veins in bilateral lower extremities and completed 8 sessions of lymphedema therapy with lymphedema clinic at [facility], but is not currently following.

## 2022-02-01 ENCOUNTER — HOSPITAL ENCOUNTER (OUTPATIENT)
Dept: RADIOLOGY | Facility: MEDICAL CENTER | Age: 72
End: 2022-02-01
Attending: NURSE PRACTITIONER
Payer: MEDICARE

## 2022-02-01 DIAGNOSIS — M25.561 CHRONIC PAIN OF RIGHT KNEE: ICD-10-CM

## 2022-02-01 DIAGNOSIS — M25.562 ACUTE PAIN OF LEFT KNEE: ICD-10-CM

## 2022-02-01 DIAGNOSIS — R60.0 EDEMA OF LEFT LOWER LEG: ICD-10-CM

## 2022-02-01 DIAGNOSIS — M54.6 CHRONIC RIGHT-SIDED THORACIC BACK PAIN: ICD-10-CM

## 2022-02-01 DIAGNOSIS — G89.29 CHRONIC PAIN OF RIGHT KNEE: ICD-10-CM

## 2022-02-01 DIAGNOSIS — G89.29 CHRONIC RIGHT-SIDED THORACIC BACK PAIN: ICD-10-CM

## 2022-02-01 DIAGNOSIS — M17.0 BILATERAL PRIMARY OSTEOARTHRITIS OF KNEE: ICD-10-CM

## 2022-02-01 DIAGNOSIS — M19.011 PRIMARY OSTEOARTHRITIS OF RIGHT SHOULDER: ICD-10-CM

## 2022-02-01 PROCEDURE — 93971 EXTREMITY STUDY: CPT | Mod: LT

## 2022-02-01 NOTE — PROGRESS NOTES
CC:   Chief Complaint   Patient presents with   • Knee Pain     left knee, 1 week     HISTORY OF THE PRESENT ILLNESS: Patient is a 71 y.o. female. This pleasant patient is here today to discuss left knee pain present for 1 week.    Health Maintenance: Completed    Bilateral primary osteoarthritis of knee  New to examiner.  Patient reports that she has had left knee pain present for 1 week without trauma or injury.  She had a right knee injury in June 2016 which is when she was prescribed meloxicam.  She has been using either meloxicam or ibuprofen for current left knee pain, icing, and taking hot shower.  States that when she is sitting or lying at rest she does not have pain, but when she stands and puts pressure on her left lower extremity or walking she is having generalized knee cap pain, posterior calf and thigh pain and posterior tibial pain.  She has been followed by a vein doctor for varicose veins in bilateral lower extremities and completed 8 sessions of lymphedema therapy with lymphedema clinic at [facility], but is not currently following.    Allergies: Pcn [penicillins]  Current Outpatient Medications Ordered in Epic   Medication Sig Dispense Refill   • meloxicam (MOBIC) 15 MG tablet Take 1 Tablet by mouth 1 time a day as needed for Moderate Pain. 90 Tablet 1   • triamcinolone acetonide (KENALOG) 0.1 % Cream Apply 1 Application to affected area(s) 2 times a day as needed. 1 Tube 11   • Omega-3 Fatty Acids (OMEGA 3 500 PO) Take 1,000 mg by mouth.     • famotidine (PEPCID) 20 MG Tab Take 1 Tab by mouth 2 times a day. 60 Tab 0   • Calcium Carbonate 600 MG Tab Take 1,200 mg by mouth every day.     • Cholecalciferol (VITAMIN D3) 5000 units Tab Take 5,000 Units by mouth every day.       No current [facility]-ordered facility-administered medications on file.     Past Medical History:   Diagnosis Date   • Gyn exam    • Heart murmur    • Hyperglycemia 12/27/2018   • Hyperlipidemia    •  "Leucocytosis 12/27/2018   • Pap smear    • Post menopausal problems    • Varicose veins of both legs with edema    • Vitamin D deficiency      Past Surgical History:   Procedure Laterality Date   • TONSILLECTOMY       Social History     Tobacco Use   • Smoking status: Never Smoker   • Smokeless tobacco: Never Used   Vaping Use   • Vaping Use: Never used   Substance Use Topics   • Alcohol use: No   • Drug use: No     Social History     Social History Narrative   • Not on file     Family History   Problem Relation Age of Onset   • Heart Disease Mother         tachycardia, pacemaker   • Cancer Mother         colon ca - resected   • Psychiatric Illness Father         suicide   • No Known Problems Brother    • Cancer Brother         Colon cancer   • Stroke Brother    • Alcohol/Drug Brother         marijuana   • Hypertension Brother    • Hypertension Maternal Aunt    • Alcohol/Drug Maternal Uncle    • Asthma Maternal Grandmother    • Heart Disease Maternal Grandmother    • Alcohol/Drug Maternal Grandfather    • Heart Disease Paternal Grandmother    • No Known Problems Paternal Grandfather      ROS:   Constitutional: No fevers, chills, malaise/fatigue.  Eyes: No eye pain.  ENT: No sore throat, congestion.   Resp: No cough, shortness of breath.  CV: No chest pain, leg swelling, palpitations.  GI: No nausea/vomiting, abdominal pain, constipation, diarrhea.  : No dysuria, hematuria.  MSK: No weakness.  Skin: No rashes.  Neuro: No dizziness, weakness, headaches.  Psych: No suicidal ideations.    All remaining systems reviewed and found to be negative, except as stated above.      Exam: /78 (BP Location: Left arm, Patient Position: Sitting, BP Cuff Size: Adult)   Pulse 87   Temp 37 °C (98.6 °F) (Temporal)   Resp 12   Ht 1.651 m (5' 5\")   Wt 78.5 kg (173 lb)   SpO2 95%  Body mass index is 28.79 kg/m².    General: Well nourished, well developed female in NAD, awake and conversant.  Eyes: Normal conjunctiva, " anicteric.  Round symmetrical pupils.  ENT: Hearing grossly intact.  No nasal discharge.  Neck: Neck is supple.  No masses or thyromegaly.  CV: No lower extremity edema.  Respiratory: Respirations are nonlabored.  No wheezing.  Abdomen: Non-Distended.  Skin: Warm.  No rashes or ulcers.  MSK: Normal ambulation.  No clubbing or cyanosis. LLE 2+ pitting edema.  Left Knee: no effusion, FROM, ligamentous structures intact. positive exam findings: + medial joint line tenderness, + lateral joint line tenderness. negative exam findings: no effusion, no erythema, no crepitus  Neuro: Sensation and CN II-XII grossly normal.  Psych: Alert and oriented.  Cooperative, appropriate mood and affect, normal judgment.     Assessment/Plan:  1. Acute pain of left knee  2. Edema of left lower leg  New to examiner, symptoms began 1 week ago without obvious injury or trauma.  Plan to have patient complete left knee x-ray as well as left lower extremity ultrasound to rule out DVT, concerning with history of varicose veins and unilateral edema. May continue to use either meloxicam or ibuprofen, do not take both medications on the same day, recommend elevation of left lower extremity above the level of the heart, icing as needed for swelling and pain, compression to me if this decreases pain. Patient to be seen in emergency room with worsening of symptoms, chest pain, headache, vision changes, or other concerning symptoms.    - DX-KNEE COMPLETE 4+ LEFT; Future  - US-EXTREMITY VENOUS LOWER UNILAT LEFT; Future    Educated in proper administration of medication(s) ordered today including safety, possible SE, risks, benefits, rationale and alternatives to therapy.   Supportive care, differential diagnoses, and indications for immediate follow-up discussed with patient.    Pathogenesis of diagnosis discussed including typical length and natural progression.    Instructed to return to clinic or nearest emergency department for any change in  condition, further concerns, or worsening of symptoms.  Patient states understanding of the plan of care and discharge instructions.    Return in about 5 months (around 6/30/2022) for HC/PCP Annual Wellness Visit- Medicare, As needed.    Please note that this dictation was created using voice recognition software. I have made every reasonable attempt to correct obvious errors, but I expect that there are errors of grammar and possibly content that I did not discover before finalizing the note.

## 2022-02-07 ENCOUNTER — PHYSICAL THERAPY (OUTPATIENT)
Dept: PHYSICAL THERAPY | Facility: REHABILITATION | Age: 72
End: 2022-02-07
Attending: NURSE PRACTITIONER
Payer: MEDICARE

## 2022-02-07 DIAGNOSIS — G89.29 CHRONIC RIGHT-SIDED THORACIC BACK PAIN: ICD-10-CM

## 2022-02-07 DIAGNOSIS — M19.011 PRIMARY OSTEOARTHRITIS OF RIGHT SHOULDER: ICD-10-CM

## 2022-02-07 DIAGNOSIS — M54.6 CHRONIC RIGHT-SIDED THORACIC BACK PAIN: ICD-10-CM

## 2022-02-07 DIAGNOSIS — M25.561 CHRONIC PAIN OF RIGHT KNEE: ICD-10-CM

## 2022-02-07 DIAGNOSIS — M17.0 BILATERAL PRIMARY OSTEOARTHRITIS OF KNEE: ICD-10-CM

## 2022-02-07 DIAGNOSIS — G89.29 CHRONIC PAIN OF RIGHT KNEE: ICD-10-CM

## 2022-02-07 PROCEDURE — 97110 THERAPEUTIC EXERCISES: CPT

## 2022-02-07 PROCEDURE — 97162 PT EVAL MOD COMPLEX 30 MIN: CPT

## 2022-02-07 SDOH — ECONOMIC STABILITY: GENERAL: QUALITY OF LIFE: GOOD

## 2022-02-07 ASSESSMENT — ENCOUNTER SYMPTOMS
AWAKENINGS PER NIGHT: 2
EXACERBATED BY: PIVOTING
QUALITY: ACHING
ALLEVIATING FACTORS: ACTIVITY
PAIN TIMING: OCCASIONAL
ALLEVIATING FACTORS: HEAT APPLICATION
PAIN TIMING: IN THE MORNING
EXACERBATED BY: SQUATTING
PAIN SCALE AT LOWEST: 0
EXACERBATED BY: KNEELING
EXACERBATED BY: BENDING
EXACERBATED BY: PROLONGED STANDING
PAIN SCALE AT HIGHEST: 9
QUALITY: PRESSURE
ALLEVIATING FACTORS: COLD/ICE
QUALITY: TIGHTNESS
PAIN SCALE: 8
EXACERBATED BY: WALKING

## 2022-02-07 NOTE — OP THERAPY EVALUATION
Outpatient Physical Therapy  INITIAL EVALUATION    [facility] [address], [address]  [place] [address]  Phone:  [phone]    Date of Evaluation: 2022    Patient: Shannan Dove  YOB: 1950  MRN: [id number]     Referring Provider: ELLEN Gomez  [address] [place] [address]   Referring Diagnosis Bilateral primary osteoarthritis of knee [M17.0];Chronic right-sided thoracic back pain [M54.6, G89.29];Primary osteoarthritis of right shoulder [M19.011];Chronic pain of right knee [M25.561, G89.29]     Time Calculation  Start time: 0815  Stop time: 0900 Time Calculation (min): 45 minutes         Chief Complaint: Knee Problem and Difficulty Walking    Visit Diagnoses     ICD-10-CM   1. Bilateral primary osteoarthritis of knee  M17.0   2. Chronic right-sided thoracic back pain  M54.6    G89.29   3. Primary osteoarthritis of right shoulder  M19.011   4. Chronic pain of right knee  M25.561    G89.29       Date of onset of impairment: 2022    Subjective:   History of Present Illness:     Date of onset:  2022    Mechanism of injury:  The patient is a 71 year old female who reports bilateral knee pain (R) > (L), shoulder pain and thoracic pain. She denies any trauma to the (L) knee. She has increased swelling and difficulty getting up and down from the chair. Prolonged sitting increases her symptoms when trying to move and walk.  Past hx of radiofrequency ablation on both knees in 0512-3520. She also had lymphatic manual therapy to the (L) LE which helped alleviate her swelling.  Quality of life:  Good  Headaches:  no headaches  Sleep disturbance:  Interrupted sleep  # Times/Night awakened:  2  Pain:     Current pain ratin    At best pain ratin    At worst pain ratin    Quality:  Aching, tightness and pressure    Pain timing:  In the morning and occasional    Relieving factors:  Activity, cold/ice and heat application     Aggravating factors:  Bending, kneeling, pivoting, prolonged standing, squatting and walking  Social Support:     Lives in:  Multiple-level home    Lives with:  Spouse  Hand dominance:  Right  Diagnostic Tests:     X-ray: abnormal    Treatments:     Previous treatment:  Injection treatment and medication    Current treatment:  Rest and medication  Patient Goals:     Patient goals for therapy:  Increased motion and decreased pain    Other patient goals:  To be able to walk normally decrease pain ans increase mobility      Past Medical History:   Diagnosis Date   • Gyn exam    • Heart murmur    • Hyperglycemia 2018   • Hyperlipidemia    • Leucocytosis 2018   • Pap smear    • Post menopausal problems    • Varicose veins of both legs with edema    • Vitamin D deficiency      Past Surgical History:   Procedure Laterality Date   • TONSILLECTOMY       Social History     Tobacco Use   • Smoking status: Never Smoker   • Smokeless tobacco: Never Used   Substance Use Topics   • Alcohol use: No     Family and Occupational History     Socioeconomic History   • Marital status:      Spouse name: Not on file   • Number of children: Not on file   • Years of education: Not on file   • Highest education level: Not on file   Occupational History   • Not on file       Objective     Tenderness   Left Knee   Tenderness in the lateral patella and medial joint line.     Active Range of Motion   Left Knee   Flexion: 118 degrees   Extension: 0 degrees     Right Knee   Flexion: 130 degrees   Extension: 2 degrees   Extensor la degrees     Patellar Mobility   Left Knee Hypomobile in the left medial, left lateral, left superior and left inferior patellar tendon(s).     Right Knee Patellar tendons within functional limits include the medial, lateral, superior and inferior.     Strength:      Left Knee   Flexion: 4+  Prone flexion: 4+  Extension: 5  Quadriceps contraction: good    Right Knee   Flexion: 5  Prone flexion:  5  Extension: 5  Quadriceps contraction: good    Tests     Left Knee   Positive medial Willie and valgus stress test at 0 degrees.     Swelling     Left Knee Girth Measurement (cm)   5 cm below joint line: 48 cm    Right Knee Girth Measurement (cm)   5 cm below joint line: 44.5 cm  Ambulation     Observational Gait   Gait: antalgic   Increased right stance time. Decreased walking speed, stride length, left stance time, left swing time, right swing time, left step length and right step length. Stride width: WFL.    Left foot contact pattern: foot flat  Right foot contact pattern: heel to toe  Left arm swing: decreased  Right arm swing: decreased  Base of support: decreased    Functional Assessment   Squat   Pain and trunk lean right.     Single Leg Squat   Left Leg  Pain and right trunk side bending.     Single Leg Stance   Left: 5 seconds  Right: 10 seconds        Therapeutic Exercises (CPT 25060):     1. Nu step bike     2. Quad sets , 1 x10 reps    3. Knee to chest stretch, 2 x30 sec      Time-based treatments/modalities:    Physical Therapy Timed Treatment Charges  Therapeutic exercise minutes (CPT 46763): 10 minutes  Assessment, Response and Plan:   Impairments: activity intolerance, impaired physical strength, lacks appropriate home exercise program, limited mobility, pain with function and weight-bearing intolerance    Assessment details:  The patient is a 71 year old female who reports (R) > (L) knee pain with getting up out of the chair and walking initially, going up and down steps reciprically, kneeling to the floor. Patient would benefit from skilled physical therapy to address chronic knee pain DJD (R) > (L) knee working on PROM/AROM, strength and conditioning, manual therapy, gait and balance training, functional training and activity tolerance.   Barriers to therapy:  None  Prognosis: good    Goals:   Short Term Goals:   1) Indep with HEP  2) 25% less difficulty getting up from the chair and walking  using SPC   3) Less antalgia to the (L) LE during gait with increased stance time over (R) LE  4) Decreased swelling by 1-2 cm or more 5cm above the (L) patella    Short term goal time span:  2-4 weeks      Long Term Goals:    1) Progression/ regression with HEP   2) Able to walk increased prolonged distances without support of SPC /walker 50% of the time   3) Negotiates up and down the steps better 50-75% of the time.  4) Increased AROM in (L) knee flexion by 5-10 degrees  5) Improved strength in (L) knee flexion 4+/5 to 5/5  Long term goal time span:  4-6 weeks    Plan:   Therapy options:  Physical therapy treatment to continue  Planned therapy interventions:  Neuromuscular Re-education (CPT 60919), Self Care ADL Training (CPT 19086), Therapeutic Activities (CPT 44580) and Therapeutic Exercise (CPT 63037)  Frequency:  2x week  Duration in weeks:  6  Duration in visits:  12  Discussed with:  Patient      Functional Assessment Used  WOMAC Grand Total: 79.17     Referring provider co-signature:  I have reviewed this plan of care and my co-signature certifies the need for services.    Certification Period: 02/07/2022 to  03/21/22    Physician Signature: ________________________________ Date: ______________

## 2022-02-08 NOTE — OP THERAPY DAILY TREATMENT
Outpatient Physical Therapy  DAILY TREATMENT     [facility] [place] [id number] [facility], [address]  [place] [address]  Phone:  [phone]    Date: 02/09/2022    Patient: Shannan Dove  YOB: 1950  MRN: [id number]     Time Calculation    Start time: 0715  Stop time: 0754 Time Calculation (min): 39 minutes         Chief Complaint: No chief complaint on file.    Visit #: 2    SUBJECTIVE:  Pt indicates that her knee is doing so much better than it was on Monday. Used Voltaren gel and NSAIDs which she isn't sure if this caused her knee to feel better. Has still been needing her SPC to walk and at times sitting for a while causes pain    AGGS: prolonged sitting; once gets up knee hurts and feels like it can buckle  Walking; painful if not using SPC    OBJECTIVE:  Current objective measures:   L knee AROM 0-0-128* (pain with flexion PFJ/medial knee; gone after self stretching)        Exercises/Treatment  Time-based treatments/modalities:    Physical Therapy Timed Treatment Charges  Therapeutic exercise minutes (CPT 95003): 39 minutes      ASSESSMENT:   Response to treatment: PT finds sig improvement in mobility deficits since eval; with cont pain during flexion based movements still at this stage which will cont to be progressed; in addition very fair proximal hip strength which will cont to be addressed along with her knee to improve functional strength.      PLAN/RECOMMENDATIONS:   Plan for treatment: therapy treatment to continue next visit.  Planned interventions for next visit: continue with current treatment.

## 2022-02-09 ENCOUNTER — PHYSICAL THERAPY (OUTPATIENT)
Dept: PHYSICAL THERAPY | Facility: REHABILITATION | Age: 72
End: 2022-02-09
Attending: NURSE PRACTITIONER
Payer: MEDICARE

## 2022-02-09 DIAGNOSIS — M17.0 BILATERAL PRIMARY OSTEOARTHRITIS OF KNEE: ICD-10-CM

## 2022-02-09 DIAGNOSIS — G89.29 CHRONIC RIGHT-SIDED THORACIC BACK PAIN: ICD-10-CM

## 2022-02-09 DIAGNOSIS — M54.6 CHRONIC RIGHT-SIDED THORACIC BACK PAIN: ICD-10-CM

## 2022-02-09 DIAGNOSIS — M25.561 CHRONIC PAIN OF RIGHT KNEE: ICD-10-CM

## 2022-02-09 DIAGNOSIS — M19.011 PRIMARY OSTEOARTHRITIS OF RIGHT SHOULDER: ICD-10-CM

## 2022-02-09 DIAGNOSIS — G89.29 CHRONIC PAIN OF RIGHT KNEE: ICD-10-CM

## 2022-02-09 PROCEDURE — 97110 THERAPEUTIC EXERCISES: CPT

## 2022-02-10 NOTE — OP THERAPY DAILY TREATMENT
"  Outpatient Physical Therapy  DAILY TREATMENT     [facility] Outpatient Physical Therapy [place] [id number] [facility], [address]  [place] [address]  Phone:  [phone]    Date: 02/14/2022    Patient: Shannan Dove  YOB: 1950  MRN: [id number]     Time Calculation    Start time: 0800  Stop time: 0838 Time Calculation (min): 38 minutes         Chief Complaint: No chief complaint on file.    Visit #: 3    SUBJECTIVE:  She notes knee pain a couple times last week; mostly after prolonged sitting but notes overall her pain is doing much better,    AGGS: prolonged sitting; once gets up knee hurts and feels like it can buckle  Walking; painful if not using SPC    OBJECTIVE:  Current objective measures:     L knee AROM 0-0-128 (no pain)  Gait no sxs  SLS aware of ant/medial knee but no sxs  No sxs sit to stand             Therapeutic Exercises (CPT 02917):       Therapeutic Exercise Summary: Quad sets/SAQ 2x1'  Hip flexor stretch x1'  Heel slides deferred  SKTC deferred    Clams 3x1'GTB    STS 3x10 ecc phase  Standing hip ABD 3x30\"      NT  Step ups  SLS      Time-based treatments/modalities:    Physical Therapy Timed Treatment Charges  Therapeutic exercise minutes (CPT 06207): 38 minutes  ASSESSMENT:   Pt with no pain with gait and SLS now; still aware of ant/medial tightness here with functional posiitons. Cont to progress her proximal thigh and hip strength at this stage.       PLAN/RECOMMENDATIONS:   Plan for treatment: therapy treatment to continue next visit.  Planned interventions for next visit: continue with current treatment.       "

## 2022-02-14 ENCOUNTER — PHYSICAL THERAPY (OUTPATIENT)
Dept: PHYSICAL THERAPY | Facility: REHABILITATION | Age: 72
End: 2022-02-14
Attending: NURSE PRACTITIONER
Payer: MEDICARE

## 2022-02-14 DIAGNOSIS — M17.0 BILATERAL PRIMARY OSTEOARTHRITIS OF KNEE: ICD-10-CM

## 2022-02-14 PROCEDURE — 97110 THERAPEUTIC EXERCISES: CPT

## 2022-02-18 NOTE — OP THERAPY DAILY TREATMENT
Outpatient Physical Therapy  DAILY TREATMENT     [facility] Outpatient Physical Therapy [address], [address]  [place] [address]  Phone:  [phone]    Date: 02/21/2022    Patient: Shannan Dove  YOB: 1950  MRN: [id number]     Time Calculation    Start time: 0800  Stop time: 0828 Time Calculation (min): 28 minutes         Chief Complaint: No chief complaint on file.    Visit #: 4    SUBJECTIVE:  Pt indicates she hasn't had much pain the last week. Feels like she is doing quite well. No longer having aggs     AGGS: prolonged sitting; walking both fine right now    OBJECTIVE:  Current objective measures:     L knee AROM 0-0-128 (no pain)  Gait no sxs  SLS no sxs  Step down no sxs  No sxs sit to stand, DL squat           Therapeutic Exercises (CPT 40712):       Therapeutic Exercise Summary: Quad sets/SAQ 2x1' deferred  Hip flexor stretch x1'  Heel slides deferred  SKTC deferred    Clams x1' BTB  Bridges x1'  squat 3x10 ecc phase from bar  Lat band walks GTB x10    NT  Step ups  SLS      Time-based treatments/modalities:  Physical Therapy Timed Treatment Charges  Therapeutic exercise minutes (CPT 38312): 28 minutes  ASSESSMENT:   Pt with no pain with gait, SLS, squatting, or step up/downs in which she is having significant progress. We spent our session updating her HEP and setting a follow up based off of this in a couple weeks. Will DC if needed and given instructions to cancel next apt if remaining pain free.     PLAN/RECOMMENDATIONS:   Plan for treatment: therapy treatment to continue next visit.  Planned interventions for next visit: continue with current treatment.

## 2022-02-21 ENCOUNTER — PHYSICAL THERAPY (OUTPATIENT)
Dept: PHYSICAL THERAPY | Facility: REHABILITATION | Age: 72
End: 2022-02-21
Attending: NURSE PRACTITIONER
Payer: MEDICARE

## 2022-02-21 DIAGNOSIS — G89.29 CHRONIC RIGHT-SIDED THORACIC BACK PAIN: ICD-10-CM

## 2022-02-21 DIAGNOSIS — M54.6 CHRONIC RIGHT-SIDED THORACIC BACK PAIN: ICD-10-CM

## 2022-02-21 DIAGNOSIS — M25.561 CHRONIC PAIN OF RIGHT KNEE: ICD-10-CM

## 2022-02-21 DIAGNOSIS — G89.29 CHRONIC PAIN OF RIGHT KNEE: ICD-10-CM

## 2022-02-21 DIAGNOSIS — M17.0 BILATERAL PRIMARY OSTEOARTHRITIS OF KNEE: ICD-10-CM

## 2022-02-21 DIAGNOSIS — M19.011 PRIMARY OSTEOARTHRITIS OF RIGHT SHOULDER: ICD-10-CM

## 2022-02-21 PROCEDURE — 97110 THERAPEUTIC EXERCISES: CPT

## 2022-02-23 ENCOUNTER — APPOINTMENT (OUTPATIENT)
Dept: PHYSICAL THERAPY | Facility: REHABILITATION | Age: 72
End: 2022-02-23
Attending: NURSE PRACTITIONER
Payer: MEDICARE

## 2022-03-01 ENCOUNTER — PATIENT MESSAGE (OUTPATIENT)
Dept: HEALTH INFORMATION MANAGEMENT | Facility: OTHER | Age: 72
End: 2022-03-01
Payer: MEDICARE

## 2022-03-01 ENCOUNTER — APPOINTMENT (OUTPATIENT)
Dept: PHYSICAL THERAPY | Facility: REHABILITATION | Age: 72
End: 2022-03-01
Attending: NURSE PRACTITIONER
Payer: MEDICARE

## 2022-03-07 ENCOUNTER — APPOINTMENT (OUTPATIENT)
Dept: PHYSICAL THERAPY | Facility: REHABILITATION | Age: 72
End: 2022-03-07
Attending: NURSE PRACTITIONER
Payer: MEDICARE

## 2022-03-07 ENCOUNTER — APPOINTMENT (OUTPATIENT)
Dept: PHYSICAL THERAPY | Facility: REHABILITATION | Age: 72
End: 2022-03-07
Payer: MEDICARE

## 2022-03-09 ENCOUNTER — APPOINTMENT (OUTPATIENT)
Dept: PHYSICAL THERAPY | Facility: REHABILITATION | Age: 72
End: 2022-03-09
Attending: NURSE PRACTITIONER
Payer: MEDICARE

## 2022-03-15 ENCOUNTER — APPOINTMENT (OUTPATIENT)
Dept: PHYSICAL THERAPY | Facility: REHABILITATION | Age: 72
End: 2022-03-15
Attending: NURSE PRACTITIONER
Payer: MEDICARE

## 2022-03-24 DIAGNOSIS — B35.1 OM (ONYCHOMYCOSIS): ICD-10-CM

## 2022-03-24 RX ORDER — TERBINAFINE HYDROCHLORIDE 250 MG/1
250 TABLET ORAL DAILY
Qty: 90 TABLET | Refills: 0 | Status: SHIPPED | OUTPATIENT
Start: 2022-03-24 | End: 2022-12-19 | Stop reason: SDUPTHER

## 2022-03-25 NOTE — PROGRESS NOTES
1. OM (onychomycosis)  - terbinafine (LAMISIL) 250 MG Tab; Take 1 Tablet by mouth every day for 90 days.  Dispense: 90 Tablet; Refill: 0

## 2022-05-09 ENCOUNTER — TELEPHONE (OUTPATIENT)
Dept: HEALTH INFORMATION MANAGEMENT | Facility: OTHER | Age: 72
End: 2022-05-09
Payer: MEDICARE

## 2022-07-26 ENCOUNTER — OFFICE VISIT (OUTPATIENT)
Dept: MEDICAL GROUP | Facility: PHYSICIAN GROUP | Age: 72
End: 2022-07-26
Payer: MEDICARE

## 2022-07-26 VITALS
DIASTOLIC BLOOD PRESSURE: 78 MMHG | TEMPERATURE: 98 F | OXYGEN SATURATION: 95 % | SYSTOLIC BLOOD PRESSURE: 140 MMHG | BODY MASS INDEX: 29.16 KG/M2 | HEIGHT: 65 IN | WEIGHT: 175 LBS | HEART RATE: 105 BPM

## 2022-07-26 DIAGNOSIS — R10.2 PELVIC PRESSURE IN FEMALE: ICD-10-CM

## 2022-07-26 PROBLEM — R35.0 URINARY FREQUENCY: Status: ACTIVE | Noted: 2022-07-26

## 2022-07-26 LAB
APPEARANCE UR: NORMAL
BILIRUB UR STRIP-MCNC: NORMAL MG/DL
COLOR UR AUTO: YELLOW
GLUCOSE UR STRIP.AUTO-MCNC: NORMAL MG/DL
KETONES UR STRIP.AUTO-MCNC: NORMAL MG/DL
LEUKOCYTE ESTERASE UR QL STRIP.AUTO: NORMAL
NITRITE UR QL STRIP.AUTO: NORMAL
PH UR STRIP.AUTO: 7 [PH] (ref 5–8)
PROT UR QL STRIP: NORMAL MG/DL
RBC UR QL AUTO: NORMAL
SP GR UR STRIP.AUTO: 1.02
UROBILINOGEN UR STRIP-MCNC: 0.2 MG/DL

## 2022-07-26 PROCEDURE — 81002 URINALYSIS NONAUTO W/O SCOPE: CPT | Performed by: NURSE PRACTITIONER

## 2022-07-26 PROCEDURE — 99214 OFFICE O/P EST MOD 30 MIN: CPT | Performed by: NURSE PRACTITIONER

## 2022-07-26 ASSESSMENT — FIBROSIS 4 INDEX: FIB4 SCORE: 0.96

## 2022-07-26 NOTE — PROGRESS NOTES
CC:   Chief Complaint   Patient presents with   • Polyuria     X 4 days   • Bump     Right hand pinky x 2 months     HISTORY OF THE PRESENT ILLNESS: Patient is a 71 y.o. female. This pleasant patient is here today with 4 days of increased urinary frequency.    Health Maintenance: Reviewed    Urinary frequency  New to examiner.  Patient reports that starting about 4 days ago she noticed an increase in urinary frequency as well as repeat voiding.  Denies dysuria, hematuria, fevers, chills. Describes vaginal pressure that feels like a tampon is in place.     Allergies: Pcn [penicillins]  Current Outpatient Medications Ordered in Epic   Medication Sig Dispense Refill   • meloxicam (MOBIC) 15 MG tablet Take 1 Tablet by mouth 1 time a day as needed for Moderate Pain. 90 Tablet 1   • triamcinolone acetonide (KENALOG) 0.1 % Cream Apply 1 Application to affected area(s) 2 times a day as needed. 1 Tube 11   • Omega-3 Fatty Acids (OMEGA 3 500 PO) Take 1,000 mg by mouth.     • famotidine (PEPCID) 20 MG Tab Take 1 Tab by mouth 2 times a day. 60 Tab 0   • Calcium Carbonate 600 MG Tab Take 1,200 mg by mouth every day.     • Cholecalciferol (VITAMIN D3) 5000 units Tab Take 5,000 Units by mouth every day.       No current [facility]-ordered facility-administered medications on file.     Past Medical History:   Diagnosis Date   • Gyn exam    • Heart murmur    • Hyperglycemia 12/27/2018   • Hyperlipidemia    • Leucocytosis 12/27/2018   • Pap smear    • Post menopausal problems    • Varicose veins of both legs with edema    • Vitamin D deficiency      Past Surgical History:   Procedure Laterality Date   • TONSILLECTOMY       Social History     Tobacco Use   • Smoking status: Never Smoker   • Smokeless tobacco: Never Used   Vaping Use   • Vaping Use: Never used   Substance Use Topics   • Alcohol use: No   • Drug use: No     Social History     Social History Narrative   • Not on file     Family History   Problem Relation Age of Onset   • Heart  "Disease Mother         tachycardia, pacemaker   • Cancer Mother         colon ca - resected   • Psychiatric Illness Father         suicide   • No Known Problems Brother    • Cancer Brother         Colon cancer   • Stroke Brother    • Alcohol/Drug Brother         marijuana   • Hypertension Brother    • Hypertension Maternal Aunt    • Alcohol/Drug Maternal Uncle    • Asthma Maternal Grandmother    • Heart Disease Maternal Grandmother    • Alcohol/Drug Maternal Grandfather    • Heart Disease Paternal Grandmother    • No Known Problems Paternal Grandfather      ROS:   See HPI      Exam: BP (!) 140/78 (BP Location: Left arm, Patient Position: Sitting, BP Cuff Size: Adult)   Pulse (!) 105   Temp 36.7 °C (98 °F) (Temporal)   Ht 1.651 m (5' 5\")   Wt 79.4 kg (175 lb)   SpO2 95%  Body mass index is 29.12 kg/m².    General: Well nourished, well developed female in NAD, awake and conversant.  Eyes: Normal conjunctiva, anicteric.  Round symmetrical pupils.  ENT: Hearing grossly intact.  No nasal discharge.  Neck: Neck is supple.  No masses or thyromegaly.  CV: No lower extremity edema.  Respiratory: Respirations are nonlabored.  No wheezing.  Abdomen: Non-Distended.  Pelvic exam:  Perineum: No external lesions are noted, color is symmetrical throughout  Vagina: Vaginal vault is well rugated.  Cervix: Parous, nonfriable  Bimanual: no adnexal masses or tenderness, no cervical motion tenderness, no uterine tenderness  Skin: Warm.  No rashes or ulcers.  MSK: Normal ambulation.  No clubbing or cyanosis.  Neuro: Sensation and CN II-XII grossly normal.  Psych: Alert and oriented.  Cooperative, appropriate mood and affect, normal judgment.      A chaperone was offered to the patient during today's exam. Chaperone name: DWAIN Paniagua Student was present.    Assessment/Plan:  1. Pelvic pressure in female  New to examiner, symptoms began 4 days ago. UA negative in clinic. Discussed possible causes including uterine or bladder " prolapse. Plan for patient to complete pelvic ultrasound and follow up to review results. Be seen in ER if unable to void.   - POCT Urinalysis  - US-PELVIC COMPLETE (TRANSABDOMINAL/TRANSVAGINAL) (COMBO); Future     Educated in proper administration of medication(s) ordered today including safety, possible SE, risks, benefits, rationale and alternatives to therapy.   Supportive care, differential diagnoses, and indications for immediate follow-up discussed with patient.    Pathogenesis of diagnosis discussed including typical length and natural progression.    Instructed to return to clinic or nearest emergency department for any change in condition, further concerns, or worsening of symptoms.  Patient states understanding of the plan of care and discharge instructions.    Return if symptoms worsen or fail to improve.    Please note that this dictation was created using voice recognition software. I have made every reasonable attempt to correct obvious errors, but I expect that there are errors of grammar and possibly content that I did not discover before finalizing the note.

## 2022-07-26 NOTE — ASSESSMENT & PLAN NOTE
New to examiner.  Patient reports that starting about 4 days ago she noticed an increase in urinary frequency as well as repeat voiding.  Denies dysuria, hematuria, fevers, chills. Describes vaginal pressure that feels like a tampon is in place.

## 2022-08-10 ENCOUNTER — TELEPHONE (OUTPATIENT)
Dept: MEDICAL GROUP | Facility: PHYSICIAN GROUP | Age: 72
End: 2022-08-10
Payer: MEDICARE

## 2022-08-10 NOTE — TELEPHONE ENCOUNTER
Future Appointments         Provider Department [place]    8/17/2022 9:00 AM (Arrive by 8:45 AM) [facility]    8/26/2022 10:30 AM (Arrive by 10:15 AM) [facility]          ESTABLISHED PATIENT PRE-VISIT PLANNING     Patient was NOT contacted to complete PVP.     Note: Patient will not be contacted if there is no indication to call.     1.  Reviewed notes from the last few office visits within the medical group: Yes, LOV 07/26/2022    2.  If any orders were placed at last visit or intended to be done for this visit (i.e. 6 mos follow-up), do we have Results/Consult Notes?           Labs - Labs ordered, completed on 07/26/2022 and results are in chart.  Note: If patient appointment is for lab review and patient did not complete labs, check with provider if OK to reschedule patient until labs completed.         Imaging - Imaging ordered, NOT completed. Patient advised to complete prior to next appointment. Imaging is scheduled          Referrals - No referrals were ordered at last office visit.    3. Is this appointment scheduled as a Hospital Follow-Up? No    4.  Immunizations were updated in Epic using Reconcile Outside Information activity? Yes    5.  Patient is due for the following Health Maintenance Topics:   There are no preventive care reminders to display for this patient.    6.  AHA (Pulse8) form printed for Provider? N/A

## 2022-08-16 SDOH — ECONOMIC STABILITY: TRANSPORTATION INSECURITY
IN THE PAST 12 MONTHS, HAS THE LACK OF TRANSPORTATION KEPT YOU FROM MEDICAL APPOINTMENTS OR FROM GETTING MEDICATIONS?: NO

## 2022-08-16 SDOH — HEALTH STABILITY: PHYSICAL HEALTH: ON AVERAGE, HOW MANY DAYS PER WEEK DO YOU ENGAGE IN MODERATE TO STRENUOUS EXERCISE (LIKE A BRISK WALK)?: 1 DAY

## 2022-08-16 SDOH — ECONOMIC STABILITY: INCOME INSECURITY: IN THE LAST 12 MONTHS, WAS THERE A TIME WHEN YOU WERE NOT ABLE TO PAY THE MORTGAGE OR RENT ON TIME?: NO

## 2022-08-16 SDOH — HEALTH STABILITY: PHYSICAL HEALTH: ON AVERAGE, HOW MANY MINUTES DO YOU ENGAGE IN EXERCISE AT THIS LEVEL?: 60 MIN

## 2022-08-16 SDOH — ECONOMIC STABILITY: HOUSING INSECURITY
IN THE LAST 12 MONTHS, WAS THERE A TIME WHEN YOU DID NOT HAVE A STEADY PLACE TO SLEEP OR SLEPT IN A SHELTER (INCLUDING NOW)?: NO

## 2022-08-16 SDOH — ECONOMIC STABILITY: INCOME INSECURITY: HOW HARD IS IT FOR YOU TO PAY FOR THE VERY BASICS LIKE FOOD, HOUSING, MEDICAL CARE, AND HEATING?: NOT HARD AT ALL

## 2022-08-16 SDOH — ECONOMIC STABILITY: HOUSING INSECURITY: IN THE LAST 12 MONTHS, HOW MANY PLACES HAVE YOU LIVED?: 1

## 2022-08-16 SDOH — ECONOMIC STABILITY: FOOD INSECURITY: WITHIN THE PAST 12 MONTHS, THE FOOD YOU BOUGHT JUST DIDN'T LAST AND YOU DIDN'T HAVE MONEY TO GET MORE.: NEVER TRUE

## 2022-08-16 SDOH — ECONOMIC STABILITY: FOOD INSECURITY: WITHIN THE PAST 12 MONTHS, YOU WORRIED THAT YOUR FOOD WOULD RUN OUT BEFORE YOU GOT MONEY TO BUY MORE.: NEVER TRUE

## 2022-08-16 SDOH — ECONOMIC STABILITY: TRANSPORTATION INSECURITY
IN THE PAST 12 MONTHS, HAS LACK OF TRANSPORTATION KEPT YOU FROM MEETINGS, WORK, OR FROM GETTING THINGS NEEDED FOR DAILY LIVING?: NO

## 2022-08-16 SDOH — HEALTH STABILITY: MENTAL HEALTH
STRESS IS WHEN SOMEONE FEELS TENSE, NERVOUS, ANXIOUS, OR CAN'T SLEEP AT NIGHT BECAUSE THEIR MIND IS TROUBLED. HOW STRESSED ARE YOU?: NOT AT ALL

## 2022-08-16 SDOH — ECONOMIC STABILITY: TRANSPORTATION INSECURITY
IN THE PAST 12 MONTHS, HAS LACK OF RELIABLE TRANSPORTATION KEPT YOU FROM MEDICAL APPOINTMENTS, MEETINGS, WORK OR FROM GETTING THINGS NEEDED FOR DAILY LIVING?: NO

## 2022-08-16 ASSESSMENT — SOCIAL DETERMINANTS OF HEALTH (SDOH)
WITHIN THE PAST 12 MONTHS, YOU WORRIED THAT YOUR FOOD WOULD RUN OUT BEFORE YOU GOT THE MONEY TO BUY MORE: NEVER TRUE
HOW OFTEN DO YOU HAVE A DRINK CONTAINING ALCOHOL: NEVER
HOW OFTEN DO YOU ATTEND CHURCH OR RELIGIOUS SERVICES?: NEVER
HOW OFTEN DO YOU ATTENT MEETINGS OF THE CLUB OR ORGANIZATION YOU BELONG TO?: NEVER
DO YOU BELONG TO ANY CLUBS OR ORGANIZATIONS SUCH AS CHURCH GROUPS UNIONS, FRATERNAL OR ATHLETIC GROUPS, OR SCHOOL GROUPS?: NO
HOW HARD IS IT FOR YOU TO PAY FOR THE VERY BASICS LIKE FOOD, HOUSING, MEDICAL CARE, AND HEATING?: NOT HARD AT ALL
HOW OFTEN DO YOU ATTEND CHURCH OR RELIGIOUS SERVICES?: NEVER
HOW OFTEN DO YOU HAVE SIX OR MORE DRINKS ON ONE OCCASION: NEVER
HOW OFTEN DO YOU GET TOGETHER WITH FRIENDS OR RELATIVES?: ONCE A WEEK
HOW OFTEN DO YOU ATTENT MEETINGS OF THE CLUB OR ORGANIZATION YOU BELONG TO?: NEVER
DO YOU BELONG TO ANY CLUBS OR ORGANIZATIONS SUCH AS CHURCH GROUPS UNIONS, FRATERNAL OR ATHLETIC GROUPS, OR SCHOOL GROUPS?: NO
IN A TYPICAL WEEK, HOW MANY TIMES DO YOU TALK ON THE PHONE WITH FAMILY, FRIENDS, OR NEIGHBORS?: THREE TIMES A WEEK
HOW OFTEN DO YOU GET TOGETHER WITH FRIENDS OR RELATIVES?: ONCE A WEEK
IN A TYPICAL WEEK, HOW MANY TIMES DO YOU TALK ON THE PHONE WITH FAMILY, FRIENDS, OR NEIGHBORS?: THREE TIMES A WEEK
HOW MANY DRINKS CONTAINING ALCOHOL DO YOU HAVE ON A TYPICAL DAY WHEN YOU ARE DRINKING: PATIENT DOES NOT DRINK

## 2022-08-16 ASSESSMENT — LIFESTYLE VARIABLES
SKIP TO QUESTIONS 9-10: 1
AUDIT-C TOTAL SCORE: 0
HOW OFTEN DO YOU HAVE A DRINK CONTAINING ALCOHOL: NEVER
HOW OFTEN DO YOU HAVE SIX OR MORE DRINKS ON ONE OCCASION: NEVER
HOW MANY STANDARD DRINKS CONTAINING ALCOHOL DO YOU HAVE ON A TYPICAL DAY: PATIENT DOES NOT DRINK

## 2022-08-17 ENCOUNTER — OFFICE VISIT (OUTPATIENT)
Dept: MEDICAL GROUP | Facility: PHYSICIAN GROUP | Age: 72
End: 2022-08-17
Payer: MEDICARE

## 2022-08-17 VITALS
OXYGEN SATURATION: 93 % | BODY MASS INDEX: 28.66 KG/M2 | DIASTOLIC BLOOD PRESSURE: 72 MMHG | TEMPERATURE: 97.8 F | SYSTOLIC BLOOD PRESSURE: 126 MMHG | WEIGHT: 172 LBS | HEIGHT: 65 IN | HEART RATE: 78 BPM

## 2022-08-17 DIAGNOSIS — Z00.00 ENCOUNTER FOR WELL ADULT EXAM WITHOUT ABNORMAL FINDINGS: ICD-10-CM

## 2022-08-17 DIAGNOSIS — E66.3 OVERWEIGHT (BMI 25.0-29.9): ICD-10-CM

## 2022-08-17 DIAGNOSIS — K21.9 GASTROESOPHAGEAL REFLUX DISEASE WITHOUT ESOPHAGITIS: ICD-10-CM

## 2022-08-17 DIAGNOSIS — E78.2 MIXED HYPERLIPIDEMIA: ICD-10-CM

## 2022-08-17 DIAGNOSIS — E55.9 VITAMIN D INSUFFICIENCY: ICD-10-CM

## 2022-08-17 DIAGNOSIS — B35.1 OM (ONYCHOMYCOSIS): ICD-10-CM

## 2022-08-17 DIAGNOSIS — E07.9 THYROID MASS: ICD-10-CM

## 2022-08-17 PROCEDURE — 99214 OFFICE O/P EST MOD 30 MIN: CPT | Performed by: NURSE PRACTITIONER

## 2022-08-17 ASSESSMENT — FIBROSIS 4 INDEX: FIB4 SCORE: 0.96

## 2022-08-17 NOTE — PROGRESS NOTES
Subjective:   CC:   Chief Complaint   Patient presents with    Annual Exam     HPI:   Shannan Dove is a 71 y.o. female who presents for annual exam    Patient has GYN provider: No   Last Pap Smear: 2016   H/O Abnormal Pap: No  Last Mammogram: 10/2021  Last Bone Density Test: 10/2021  Last Colorectal Cancer Screening: 10/2021  Last Tdap: 2020  Received HPV series: Aged out    Exercise: Walks occasionally, walks in the house, does all of her lawn work.   Diet: Too much sugar      Patient's last menstrual period was 2006 (within years).  She has not utilized hormone replacement therapy.  Denies any menopausal symptoms.  No significant bloating/fluid retention, pelvic pain, or dyspareunia. No abnormal vaginal discharge.   No breast tenderness, mass, nipple discharge or changes in size or contour.    OB History    Para Term  AB Living   2 2 2 0 0 2   SAB IAB Ectopic Molar Multiple Live Births   0 0 0 0 0 2      She  reports that she is not currently sexually active and has had partner(s) who are male. She reports using the following method of birth control/protection: Post-Menopausal.  She  has a past medical history of Gyn exam, Heart murmur, Hyperglycemia (2018), Hyperlipidemia, Leucocytosis (2018), Pap smear, Post menopausal problems, Varicose veins of both legs with edema, and Vitamin D deficiency.  She  has a past surgical history that includes tonsillectomy.    Family History   Problem Relation Age of Onset    Heart Disease Mother         fast heart beat    Cancer Mother         colon    Psychiatric Illness Father         suicide    Autism Brother         asperger's ?    Cancer Brother         colon,liver,lung    Stroke Brother     Alcohol/Drug Brother         marijuana    Drug abuse Brother         cocaine,marijuana    Alcohol abuse Brother     Hypertension Brother     Hypertension Maternal Aunt     Heart Disease Maternal Aunt         fast heart beat,had ablation    Heart  Disease Maternal Aunt         fast heart beat    Alcohol/Drug Maternal Uncle     Asthma Maternal Grandmother     Heart Disease Maternal Grandmother         weak heart ?    Alcohol/Drug Maternal Grandfather     Heart Disease Maternal Grandfather         heart attack    Heart Disease Paternal Grandmother     No Known Problems Paternal Grandfather      Social History     Tobacco Use    Smoking status: Never    Smokeless tobacco: Never   Vaping Use    Vaping Use: Never used   Substance Use Topics    Alcohol use: No    Drug use: No     Patient Active Problem List    Diagnosis Date Noted    Overweight (BMI 25.0-29.9) 08/17/2022    Urinary frequency 07/26/2022    Bilateral primary osteoarthritis of knee 01/31/2022    Paronychia of great toe, right 08/17/2021    Bilateral lower extremity edema 03/03/2021    Primary osteoarthritis of right shoulder 03/03/2021    Varicose vein of leg 08/10/2018    Gastroesophageal reflux disease without esophagitis 12/22/2017    Thyroid mass 06/23/2017    Vitamin D insufficiency 06/22/2017    OM (onychomycosis) 06/22/2017    AK (actinic keratosis) 06/23/2016    Chronic pain of right knee 06/23/2016    Mixed hyperlipidemia 06/23/2016    Right-sided thoracic back pain 06/23/2016    Heart murmur     Osteopenia of multiple sites      Current Outpatient Medications   Medication Sig Dispense Refill    meloxicam (MOBIC) 15 MG tablet Take 1 Tablet by mouth 1 time a day as needed for Moderate Pain. 90 Tablet 1    triamcinolone acetonide (KENALOG) 0.1 % Cream Apply 1 Application to affected area(s) 2 times a day as needed. 1 Tube 11    Omega-3 Fatty Acids (OMEGA 3 500 PO) Take 1,000 mg by mouth.      famotidine (PEPCID) 20 MG Tab Take 1 Tab by mouth 2 times a day. 60 Tab 0    Calcium Carbonate 600 MG Tab Take 1,200 mg by mouth every day.      Cholecalciferol (VITAMIN D3) 5000 units Tab Take 5,000 Units by mouth every day.       No current facility-administered medications for this visit.     Allergies  "  Allergen Reactions    Pcn [Penicillins] Hives     Review of Systems   Constitutional: Negative for fever, chills and malaise/fatigue.   HENT: Negative for congestion.    Eyes: Negative for pain.   Respiratory: Negative for cough and shortness of breath.    Cardiovascular: Negative for chest pain and leg swelling.   Gastrointestinal: Negative for nausea, vomiting, abdominal pain and diarrhea.   Genitourinary: Negative for dysuria and hematuria.   Skin: Negative for rash.   Neurological: Negative for dizziness, focal weakness and headaches.   Endo/Heme/Allergies: Does not bruise/bleed easily.   Psychiatric/Behavioral: Negative for depression.  The patient is not nervous/anxious.      Objective:   /72 (BP Location: Right arm, Patient Position: Sitting, BP Cuff Size: Adult)   Pulse 78   Temp 36.6 °C (97.8 °F) (Temporal)   Ht 1.638 m (5' 4.5\")   Wt 78 kg (172 lb)   LMP 01/01/2006 (Within Years)   SpO2 93%   BMI 29.07 kg/m²     Wt Readings from Last 4 Encounters:   08/17/22 78 kg (172 lb)   07/26/22 79.4 kg (175 lb)   01/31/22 78.5 kg (173 lb)   08/17/21 75.8 kg (167 lb)     Physical Exam:  Constitutional: Well-developed and well-nourished. Not diaphoretic. No distress.   Skin: Skin is warm and dry. No rash noted.  Head: Atraumatic without lesions.  Eyes: Conjunctivae and extraocular motions are normal. Pupils are equal, round, and reactive to light. No scleral icterus.   Ears:  External ears unremarkable. Tympanic membranes clear and intact.  Nose: Nares patent. Septum midline. Turbinates without erythema nor edema. No discharge.   Mouth/Throat: Tongue normal. Oropharynx is clear and moist. Posterior pharynx without erythema or exudates.  Neck: Supple, trachea midline. Normal range of motion. No thyromegaly present. No lymphadenopathy--cervical or supraclavicular.  Cardiovascular: Regular rate and rhythm, S1 and S2 without murmur, rubs, or gallops.    Respiratory: Effort normal. Clear to auscultation " throughout. No adventitious sounds.   Breast:  Breast exam deferred. Discussed monthly self exams and what to look for, including peau d'orange or nipple retraction, discharge, breasts moving freely and equally without restriction, axillary/supraclavicular adenopathy, or palpable masses/nodules.  Abdomen: Soft, non tender, and without distention. Active bowel sounds in all four quadrants. No rebound, guarding, masses or HSM.  Extremities: No cyanosis, clubbing, erythema, nor edema. Distal pulses intact and symmetric.   Musculoskeletal: All major joints AROM full in all directions without pain.  Neurological: Alert and oriented x 3. Grossly non-focal. Strength and sensation grossly intact. DTRs 2+/3 and symmetric.   Psychiatric:  Behavior, mood, and affect are appropriate.    Assessment and Plan:   1. Encounter for well adult exam without abnormal findings    2. Mixed hyperlipidemia  Chronic, ongoing without statin medication.  Continues over-the-counter omega-3 supplement daily, healthy diet, exercise.  Due for updated annual labs prior to follow-up.  - Comp Metabolic Panel; Future  - Lipid Profile; Future  - TSH WITH REFLEX TO FT4; Future    3. Overweight (BMI 25.0-29.9)  Encourage diet high in fruits, vegetables, and fiber. And a diet low in salt, refined carbohydrates, cholesterol, saturated fat, and trans fatty acids.    Encourage a minimum of 30 minutes of moderate intensity aerobic exercise (eg, brisk walking) is recommended on five days each week. Or 30 minutes of vigorous-intensity aerobic exercise (eg, jogging) on three days each week.   Patient's body mass index is 29.07 kg/m². Exercise and nutrition counseling were performed at this visit.  Due for updated annual labs prior to follow-up.  - CBC WITH DIFFERENTIAL; Future  - Comp Metabolic Panel; Future  - Lipid Profile; Future  - TSH WITH REFLEX TO FT4; Future    4. Gastroesophageal reflux disease without esophagitis  Chronic, ongoing.  Continue famotidine  20 mg twice daily and avoiding triggers.  Due for updated annual labs prior to follow-up.  - CBC WITH DIFFERENTIAL; Future  - Comp Metabolic Panel; Future  - TSH WITH REFLEX TO FT4; Future    5. Thyroid mass  Chronic, stable.  Routine screening not recommended unless patient notices changes and mass develops or symptoms.  Due for updated annual labs prior to follow-up.  - TSH WITH REFLEX TO FT4; Future    6. Vitamin D insufficiency  Chronic, ongoing.  Continue vitamin D and calcium supplement daily.  Due for updated annual labs prior to follow-up.  - VITAMIN D,25 HYDROXY; Future    7. OM (onychomycosis)  Chronic, ongoing.  Due for updated annual labs prior to follow-up.  May continue terbinafine after lab review.  - Comp Metabolic Panel; Future     Health maintenance: Up-to-date  Labs per orders  Immunizations: Up-to-date  Patient counseled about skin care, diet, supplements, and exercise.  Discussed  breast self exam, mammography screening, menopause, osteoporosis, adequate intake of calcium and vitamin D, diet and exercise, colorectal cancer screening     Follow-up: Return in about 3 weeks (around 9/7/2022) for Follow up Labs.     Please note that this dictation was created using voice recognition software. I have worked with consultants from the vendor as well as technical experts from [employer] to optimize the interface. I have made every reasonable attempt to correct obvious errors, but I expect that there are errors of grammar and possibly content that I did not discover before finalizing the note.

## 2022-08-26 ENCOUNTER — HOSPITAL ENCOUNTER (OUTPATIENT)
Dept: RADIOLOGY | Facility: MEDICAL CENTER | Age: 72
End: 2022-08-26
Attending: NURSE PRACTITIONER
Payer: MEDICARE

## 2022-08-26 DIAGNOSIS — R10.2 PELVIC PRESSURE IN FEMALE: ICD-10-CM

## 2022-08-26 PROCEDURE — 76830 TRANSVAGINAL US NON-OB: CPT

## 2022-08-27 DIAGNOSIS — R93.89 THICKENED ENDOMETRIUM: ICD-10-CM

## 2022-09-06 ENCOUNTER — TELEPHONE (OUTPATIENT)
Dept: MEDICAL GROUP | Facility: PHYSICIAN GROUP | Age: 72
End: 2022-09-06
Payer: MEDICARE

## 2022-09-06 NOTE — TELEPHONE ENCOUNTER
Future Appointments         Provider Department Center    9/8/2022 7:45 AM LAB [facility]     9/13/2022 10:20 AM (Arrive by 10:05 AM) [facility]    10/17/2022 8:30 AM (Arrive by 8:15 AM) [facility]          ESTABLISHED PATIENT PRE-VISIT PLANNING     Patient was NOT contacted to complete PVP.     Note: Patient will not be contacted if there is no indication to call.     1.  Reviewed notes from the last few office visits within the medical group: Yes, LOV 08/17/2022    2.  If any orders were placed at last visit or intended to be done for this visit (i.e. 6 mos follow-up), do we have Results/Consult Notes?           Labs - Labs ordered, but not to be completed until 09/08/22 pt has appt with lab.  Note: If patient appointment is for lab review and patient did not complete labs, check with provider if OK to reschedule patient until labs completed.         Imaging - Imaging was not ordered at last office visit.         Referrals - No referrals were ordered at last office visit.    3. Is this appointment scheduled as a Hospital Follow-Up? No    4.  Immunizations were updated in Epic using Reconcile Outside Information activity? Yes    5.  Patient is due for the following Health Maintenance Topics:   Health Maintenance Due   Topic Date Due    IMM INFLUENZA (1) 09/01/2022     6.  AHA (Pulse8) form printed for Provider? N/A

## 2022-09-08 ENCOUNTER — HOSPITAL ENCOUNTER (OUTPATIENT)
Dept: LAB | Facility: MEDICAL CENTER | Age: 72
End: 2022-09-08
Attending: NURSE PRACTITIONER
Payer: MEDICARE

## 2022-09-08 DIAGNOSIS — K21.9 GASTROESOPHAGEAL REFLUX DISEASE WITHOUT ESOPHAGITIS: ICD-10-CM

## 2022-09-08 DIAGNOSIS — E66.3 OVERWEIGHT (BMI 25.0-29.9): ICD-10-CM

## 2022-09-08 DIAGNOSIS — E78.2 MIXED HYPERLIPIDEMIA: ICD-10-CM

## 2022-09-08 DIAGNOSIS — B35.1 OM (ONYCHOMYCOSIS): ICD-10-CM

## 2022-09-08 DIAGNOSIS — E07.9 THYROID MASS: ICD-10-CM

## 2022-09-08 DIAGNOSIS — E55.9 VITAMIN D INSUFFICIENCY: ICD-10-CM

## 2022-09-08 LAB
25(OH)D3 SERPL-MCNC: 40 NG/ML (ref 30–100)
ALBUMIN SERPL BCP-MCNC: 4.4 G/DL (ref 3.2–4.9)
ALBUMIN/GLOB SERPL: 1.9 G/DL
ALP SERPL-CCNC: 49 U/L (ref 30–99)
ALT SERPL-CCNC: 33 U/L (ref 2–50)
ANION GAP SERPL CALC-SCNC: 9 MMOL/L (ref 7–16)
AST SERPL-CCNC: 17 U/L (ref 12–45)
BASOPHILS # BLD AUTO: 0.5 % (ref 0–1.8)
BASOPHILS # BLD: 0.03 K/UL (ref 0–0.12)
BILIRUB SERPL-MCNC: 0.6 MG/DL (ref 0.1–1.5)
BUN SERPL-MCNC: 21 MG/DL (ref 8–22)
CALCIUM SERPL-MCNC: 9.5 MG/DL (ref 8.5–10.5)
CHLORIDE SERPL-SCNC: 103 MMOL/L (ref 96–112)
CHOLEST SERPL-MCNC: 238 MG/DL (ref 100–199)
CO2 SERPL-SCNC: 26 MMOL/L (ref 20–33)
CREAT SERPL-MCNC: 0.67 MG/DL (ref 0.5–1.4)
EOSINOPHIL # BLD AUTO: 0.23 K/UL (ref 0–0.51)
EOSINOPHIL NFR BLD: 4.1 % (ref 0–6.9)
ERYTHROCYTE [DISTWIDTH] IN BLOOD BY AUTOMATED COUNT: 42.5 FL (ref 35.9–50)
FASTING STATUS PATIENT QL REPORTED: NORMAL
GFR SERPLBLD CREATININE-BSD FMLA CKD-EPI: 94 ML/MIN/1.73 M 2
GLOBULIN SER CALC-MCNC: 2.3 G/DL (ref 1.9–3.5)
GLUCOSE SERPL-MCNC: 98 MG/DL (ref 65–99)
HCT VFR BLD AUTO: 39.8 % (ref 37–47)
HDLC SERPL-MCNC: 44 MG/DL
HGB BLD-MCNC: 13.3 G/DL (ref 12–16)
IMM GRANULOCYTES # BLD AUTO: 0.01 K/UL (ref 0–0.11)
IMM GRANULOCYTES NFR BLD AUTO: 0.2 % (ref 0–0.9)
LDLC SERPL CALC-MCNC: 160 MG/DL
LYMPHOCYTES # BLD AUTO: 3.2 K/UL (ref 1–4.8)
LYMPHOCYTES NFR BLD: 56.6 % (ref 22–41)
MCH RBC QN AUTO: 30.3 PG (ref 27–33)
MCHC RBC AUTO-ENTMCNC: 33.4 G/DL (ref 33.6–35)
MCV RBC AUTO: 90.7 FL (ref 81.4–97.8)
MONOCYTES # BLD AUTO: 0.46 K/UL (ref 0–0.85)
MONOCYTES NFR BLD AUTO: 8.1 % (ref 0–13.4)
NEUTROPHILS # BLD AUTO: 1.72 K/UL (ref 2–7.15)
NEUTROPHILS NFR BLD: 30.5 % (ref 44–72)
NRBC # BLD AUTO: 0 K/UL
NRBC BLD-RTO: 0 /100 WBC
PLATELET # BLD AUTO: 241 K/UL (ref 164–446)
PMV BLD AUTO: 10.1 FL (ref 9–12.9)
POTASSIUM SERPL-SCNC: 3.8 MMOL/L (ref 3.6–5.5)
PROT SERPL-MCNC: 6.7 G/DL (ref 6–8.2)
RBC # BLD AUTO: 4.39 M/UL (ref 4.2–5.4)
SODIUM SERPL-SCNC: 138 MMOL/L (ref 135–145)
TRIGL SERPL-MCNC: 168 MG/DL (ref 0–149)
TSH SERPL DL<=0.005 MIU/L-ACNC: 2.46 UIU/ML (ref 0.38–5.33)
WBC # BLD AUTO: 5.7 K/UL (ref 4.8–10.8)

## 2022-09-08 PROCEDURE — 84443 ASSAY THYROID STIM HORMONE: CPT

## 2022-09-08 PROCEDURE — 82306 VITAMIN D 25 HYDROXY: CPT

## 2022-09-08 PROCEDURE — 85025 COMPLETE CBC W/AUTO DIFF WBC: CPT

## 2022-09-08 PROCEDURE — 36415 COLL VENOUS BLD VENIPUNCTURE: CPT

## 2022-09-08 PROCEDURE — 80053 COMPREHEN METABOLIC PANEL: CPT

## 2022-09-08 PROCEDURE — 80061 LIPID PANEL: CPT

## 2022-09-13 ENCOUNTER — OFFICE VISIT (OUTPATIENT)
Dept: MEDICAL GROUP | Facility: PHYSICIAN GROUP | Age: 72
End: 2022-09-13
Payer: MEDICARE

## 2022-09-13 VITALS
TEMPERATURE: 97.7 F | SYSTOLIC BLOOD PRESSURE: 130 MMHG | HEART RATE: 77 BPM | DIASTOLIC BLOOD PRESSURE: 78 MMHG | HEIGHT: 65 IN | BODY MASS INDEX: 27.99 KG/M2 | RESPIRATION RATE: 15 BRPM | OXYGEN SATURATION: 96 % | WEIGHT: 168 LBS

## 2022-09-13 DIAGNOSIS — E78.2 MIXED HYPERLIPIDEMIA: ICD-10-CM

## 2022-09-13 DIAGNOSIS — E55.9 VITAMIN D INSUFFICIENCY: ICD-10-CM

## 2022-09-13 PROCEDURE — 99214 OFFICE O/P EST MOD 30 MIN: CPT | Performed by: NURSE PRACTITIONER

## 2022-09-13 RX ORDER — ATORVASTATIN CALCIUM 20 MG/1
20 TABLET, FILM COATED ORAL EVERY EVENING
Qty: 100 TABLET | Refills: 3 | Status: SHIPPED | OUTPATIENT
Start: 2022-09-13 | End: 2023-10-31

## 2022-09-13 ASSESSMENT — FIBROSIS 4 INDEX: FIB4 SCORE: 0.86

## 2022-09-13 NOTE — PROGRESS NOTES
CC:   Chief Complaint   Patient presents with    Lab Follow-up     HISTORY OF THE PRESENT ILLNESS: Patient is a 71 y.o. female. This pleasant patient is here today to review lab results.    Health Maintenance: Reviewed    Mixed hyperlipidemia  Chronic, uncontrolled.  Continues omega-3 fatty acids, healthy diet, exercise.   The 10-year ASCVD risk score (Tununakzackary TRUJILLO Jr., et al., 2013) is: 10.7%     Vitamin D insufficiency  Chronic, ongoing.  Continues vitamin D and calcium supplement daily.  Due for annual labs in September 2023.    Allergies: Pcn [penicillins]  Current Outpatient Medications Ordered in Epic   Medication Sig Dispense Refill    atorvastatin (LIPITOR) 20 MG Tab Take 1 Tablet by mouth every evening. 100 Tablet 3    meloxicam (MOBIC) 15 MG tablet Take 1 Tablet by mouth 1 time a day as needed for Moderate Pain. 90 Tablet 1    triamcinolone acetonide (KENALOG) 0.1 % Cream Apply 1 Application to affected area(s) 2 times a day as needed. 1 Tube 11    Omega-3 Fatty Acids (OMEGA 3 500 PO) Take 1,000 mg by mouth.      famotidine (PEPCID) 20 MG Tab Take 1 Tab by mouth 2 times a day. 60 Tab 0    Calcium Carbonate 600 MG Tab Take 1,200 mg by mouth every day.      Cholecalciferol (VITAMIN D3) 5000 units Tab Take 5,000 Units by mouth every day.       No current [facility]-ordered facility-administered medications on file.     Past Medical History:   Diagnosis Date    Gyn exam     Heart murmur     Hyperglycemia 12/27/2018    Hyperlipidemia     Leucocytosis 12/27/2018    Pap smear     Post menopausal problems     Varicose veins of both legs with edema     Vitamin D deficiency      Past Surgical History:   Procedure Laterality Date    TONSILLECTOMY       Social History     Tobacco Use    Smoking status: Never    Smokeless tobacco: Never   Vaping Use    Vaping Use: Never used   Substance Use Topics    Alcohol use: No    Drug use: No     Social History     Social History Narrative    Not on file     Family History   Problem  "Relation Age of Onset    Heart Disease Mother         fast heart beat    Cancer Mother         colon    Psychiatric Illness Father         suicide    Autism Brother         asperger's ?    Cancer Brother         colon,liver,lung    Stroke Brother     Alcohol/Drug Brother         marijuana    Drug abuse Brother         cocaine,marijuana    Alcohol abuse Brother     Hypertension Brother     Hypertension Maternal Aunt     Heart Disease Maternal Aunt         fast heart beat,had ablation    Heart Disease Maternal Aunt         fast heart beat    Alcohol/Drug Maternal Uncle     Asthma Maternal Grandmother     Heart Disease Maternal Grandmother         weak heart ?    Alcohol/Drug Maternal Grandfather     Heart Disease Maternal Grandfather         heart attack    Heart Disease Paternal Grandmother     No Known Problems Paternal Grandfather      ROS:   Constitutional: No fevers, chills, malaise/fatigue.  Eyes: No eye pain.  ENT: No sore throat, congestion.   Resp: No cough, shortness of breath.  CV: No chest pain, leg swelling, palpitations.  GI: No nausea/vomiting, abdominal pain, constipation, diarrhea.  : No dysuria, hematuria.  MSK: No weakness.  Skin: No rashes.  Neuro: No dizziness, weakness, headaches.  Psych: No suicidal ideations.    All remaining systems reviewed and found to be negative, except as stated above.        Exam: /78 (BP Location: Left arm, Patient Position: Sitting, BP Cuff Size: Small adult)   Pulse 77   Temp 36.5 °C (97.7 °F) (Temporal)   Resp 15   Ht 1.651 m (5' 5\")   Wt 76.2 kg (168 lb)   SpO2 96%  Body mass index is 27.96 kg/m².    General: Well nourished, well developed female in NAD, awake and conversant.  Eyes: Normal conjunctiva, anicteric.  Round symmetrical pupils.  ENT: Hearing grossly intact.  No nasal discharge.  Neck: Neck is supple.  No masses or thyromegaly.  CV: No lower extremity edema.  Respiratory: Respirations are nonlabored.  No wheezing.  Abdomen: " Non-Distended.  Skin: Warm.  No rashes or ulcers.  MSK: Normal ambulation.  No clubbing or cyanosis.  Neuro: Sensation and CN II-XII grossly normal.  Psych: Alert and oriented.  Cooperative, appropriate mood and affect, normal judgment.      Assessment/Plan:  1. Mixed hyperlipidemia  Chronic, uncontrolled.  Patient agrees to start atorvastatin 20 mg daily, recommend taking the medication in the evening before bed.  Continue over-the-counter omega-3 supplement.  May also start co-Q10 supplement to help decrease chance of myalgias related to statin.  Reviewed the risks and benefits of treatment and potential side effects of medication.  Patient understands to contact provider if side effects occur.  Encourage diet high in fruits, vegetables, and fiber. And a diet low in salt, refined carbohydrates, cholesterol, saturated fat, and trans fatty acids.    Encourage a minimum of 30 minutes of moderate intensity aerobic exercise (eg, brisk walking) is recommended on five days each week. Or 30 minutes of vigorous-intensity aerobic exercise (eg, jogging) on three days each week.   Due for updated lab work in 6 months.  - atorvastatin (LIPITOR) 20 MG Tab; Take 1 Tablet by mouth every evening.  Dispense: 100 Tablet; Refill: 3  - Comp Metabolic Panel; Future  - Lipid Profile; Future    2. Vitamin D insufficiency  Chronic, stable.  Continue vitamin D and calcium supplement daily.  Due for annual labs in September 2023.    Educated in proper administration of medication(s) ordered today including safety, possible SE, risks, benefits, rationale and alternatives to therapy.   Supportive care, differential diagnoses, and indications for immediate follow-up discussed with patient.    Pathogenesis of diagnosis discussed including typical length and natural progression.    Instructed to return to clinic or nearest emergency department for any change in condition, further concerns, or worsening of symptoms.  Patient states understanding  of the plan of care and discharge instructions.    Return in about 6 months (around 3/13/2023) for High Cholesterol, Follow up Labs, Follow up Medications.    Please note that this dictation was created using voice recognition software. I have made every reasonable attempt to correct obvious errors, but I expect that there are errors of grammar and possibly content that I did not discover before finalizing the note.

## 2022-09-13 NOTE — ASSESSMENT & PLAN NOTE
Chronic, uncontrolled.  Continues omega-3 fatty acids, healthy diet, exercise.   The 10-year ASCVD risk score (Hamiltonzackary TRUJILLO Jr., et al., 2013) is: 10.7%

## 2022-09-13 NOTE — ASSESSMENT & PLAN NOTE
Chronic, ongoing.  Continues vitamin D and calcium supplement daily.  Due for annual labs in September 2023.

## 2022-09-28 ENCOUNTER — APPOINTMENT (RX ONLY)
Dept: URBAN - METROPOLITAN AREA CLINIC 22 | Facility: CLINIC | Age: 72
Setting detail: DERMATOLOGY
End: 2022-09-28

## 2022-09-28 DIAGNOSIS — Z71.89 OTHER SPECIFIED COUNSELING: ICD-10-CM

## 2022-09-28 DIAGNOSIS — D22 MELANOCYTIC NEVI: ICD-10-CM

## 2022-09-28 DIAGNOSIS — L40.4 GUTTATE PSORIASIS: ICD-10-CM

## 2022-09-28 DIAGNOSIS — L81.4 OTHER MELANIN HYPERPIGMENTATION: ICD-10-CM

## 2022-09-28 DIAGNOSIS — L82.1 OTHER SEBORRHEIC KERATOSIS: ICD-10-CM

## 2022-09-28 DIAGNOSIS — L57.0 ACTINIC KERATOSIS: ICD-10-CM

## 2022-09-28 PROBLEM — D22.5 MELANOCYTIC NEVI OF TRUNK: Status: ACTIVE | Noted: 2022-09-28

## 2022-09-28 PROCEDURE — 17000 DESTRUCT PREMALG LESION: CPT

## 2022-09-28 PROCEDURE — ? LIQUID NITROGEN

## 2022-09-28 PROCEDURE — ? COUNSELING

## 2022-09-28 PROCEDURE — 99213 OFFICE O/P EST LOW 20 MIN: CPT | Mod: 25

## 2022-09-28 PROCEDURE — 17003 DESTRUCT PREMALG LES 2-14: CPT

## 2022-09-28 PROCEDURE — ? SUNSCREEN TREATMENT REGIMEN

## 2022-09-28 ASSESSMENT — LOCATION SIMPLE DESCRIPTION DERM
LOCATION SIMPLE: RIGHT PRETIBIAL REGION
LOCATION SIMPLE: LEFT PRETIBIAL REGION
LOCATION SIMPLE: LEFT LIP
LOCATION SIMPLE: UPPER BACK
LOCATION SIMPLE: INFERIOR FOREHEAD
LOCATION SIMPLE: LEFT FOREARM
LOCATION SIMPLE: RIGHT ZYGOMA
LOCATION SIMPLE: RIGHT FOREARM
LOCATION SIMPLE: LEFT CHEEK

## 2022-09-28 ASSESSMENT — LOCATION DETAILED DESCRIPTION DERM
LOCATION DETAILED: LEFT DISTAL PRETIBIAL REGION
LOCATION DETAILED: SUPERIOR THORACIC SPINE
LOCATION DETAILED: RIGHT VENTRAL PROXIMAL FOREARM
LOCATION DETAILED: LEFT PROXIMAL RADIAL DORSAL FOREARM
LOCATION DETAILED: INFERIOR MID FOREHEAD
LOCATION DETAILED: LEFT SUPERIOR LATERAL BUCCAL CHEEK
LOCATION DETAILED: LEFT LATERAL BUCCAL CHEEK
LOCATION DETAILED: RIGHT PROXIMAL PRETIBIAL REGION
LOCATION DETAILED: LEFT VENTRAL PROXIMAL FOREARM
LOCATION DETAILED: RIGHT MEDIAL ZYGOMA
LOCATION DETAILED: LEFT UPPER CUTANEOUS LIP
LOCATION DETAILED: LEFT SUPERIOR MEDIAL MALAR CHEEK
LOCATION DETAILED: INFERIOR THORACIC SPINE

## 2022-09-28 ASSESSMENT — LOCATION ZONE DERM
LOCATION ZONE: TRUNK
LOCATION ZONE: LIP
LOCATION ZONE: FACE
LOCATION ZONE: LEG
LOCATION ZONE: ARM

## 2022-10-17 ENCOUNTER — HOSPITAL ENCOUNTER (OUTPATIENT)
Dept: RADIOLOGY | Facility: MEDICAL CENTER | Age: 72
End: 2022-10-17
Attending: NURSE PRACTITIONER
Payer: MEDICARE

## 2022-10-17 DIAGNOSIS — Z12.31 VISIT FOR SCREENING MAMMOGRAM: ICD-10-CM

## 2022-10-17 PROCEDURE — 77063 BREAST TOMOSYNTHESIS BI: CPT

## 2022-11-03 ENCOUNTER — APPOINTMENT (OUTPATIENT)
Dept: MEDICAL GROUP | Facility: PHYSICIAN GROUP | Age: 72
End: 2022-11-03
Payer: MEDICARE

## 2022-11-06 DIAGNOSIS — R10.2 PELVIC PRESSURE IN FEMALE: ICD-10-CM

## 2022-11-06 DIAGNOSIS — R35.0 URINARY FREQUENCY: ICD-10-CM

## 2022-11-06 NOTE — PROGRESS NOTES
1. Urinary frequency  - Referral to Urogynecology    2. Pelvic pressure in female  - Referral to Urogynecology

## 2022-12-19 ENCOUNTER — HOSPITAL ENCOUNTER (OUTPATIENT)
Dept: RADIOLOGY | Facility: MEDICAL CENTER | Age: 72
End: 2022-12-19
Attending: NURSE PRACTITIONER
Payer: MEDICARE

## 2022-12-19 ENCOUNTER — OFFICE VISIT (OUTPATIENT)
Dept: MEDICAL GROUP | Facility: PHYSICIAN GROUP | Age: 72
End: 2022-12-19
Payer: MEDICARE

## 2022-12-19 VITALS
OXYGEN SATURATION: 98 % | SYSTOLIC BLOOD PRESSURE: 130 MMHG | TEMPERATURE: 97.7 F | RESPIRATION RATE: 16 BRPM | HEIGHT: 64 IN | WEIGHT: 179 LBS | BODY MASS INDEX: 30.56 KG/M2 | HEART RATE: 77 BPM | DIASTOLIC BLOOD PRESSURE: 78 MMHG

## 2022-12-19 DIAGNOSIS — I89.0 LYMPHEDEMA OF BOTH LOWER EXTREMITIES: ICD-10-CM

## 2022-12-19 DIAGNOSIS — B35.1 OM (ONYCHOMYCOSIS): ICD-10-CM

## 2022-12-19 DIAGNOSIS — M25.512 ACUTE PAIN OF LEFT SHOULDER: ICD-10-CM

## 2022-12-19 PROCEDURE — 99214 OFFICE O/P EST MOD 30 MIN: CPT | Performed by: NURSE PRACTITIONER

## 2022-12-19 PROCEDURE — 73030 X-RAY EXAM OF SHOULDER: CPT | Mod: LT

## 2022-12-19 RX ORDER — TERBINAFINE HYDROCHLORIDE 250 MG/1
250 TABLET ORAL DAILY
Qty: 90 TABLET | Refills: 0 | Status: SHIPPED | OUTPATIENT
Start: 2022-12-19 | End: 2023-03-19

## 2022-12-19 ASSESSMENT — FIBROSIS 4 INDEX: FIB4 SCORE: 0.87

## 2022-12-19 NOTE — ASSESSMENT & PLAN NOTE
New to examiner.  Patient reports that she had sudden left shoulder pain that occurred just before Thanksgiving.  Reports that she has to lift her left arm with her right arm, she is unable to lift independently.  Holding her left upper extremity forward while driving causes pain.  She has had to change the way she dresses her self due to this pain.  She has tried ibuprofen, meloxicam, heating pads, Salonpas patches, and topical Bengay.

## 2022-12-21 ENCOUNTER — OFFICE VISIT (OUTPATIENT)
Dept: MEDICAL GROUP | Facility: PHYSICIAN GROUP | Age: 72
End: 2022-12-21
Payer: MEDICARE

## 2022-12-21 VITALS
SYSTOLIC BLOOD PRESSURE: 130 MMHG | TEMPERATURE: 98 F | HEART RATE: 78 BPM | DIASTOLIC BLOOD PRESSURE: 78 MMHG | OXYGEN SATURATION: 98 % | WEIGHT: 179 LBS | HEIGHT: 65 IN | RESPIRATION RATE: 16 BRPM | BODY MASS INDEX: 29.82 KG/M2

## 2022-12-21 DIAGNOSIS — M19.012 PRIMARY OSTEOARTHRITIS OF LEFT SHOULDER: ICD-10-CM

## 2022-12-21 PROCEDURE — 20610 DRAIN/INJ JOINT/BURSA W/O US: CPT | Performed by: NURSE PRACTITIONER

## 2022-12-21 RX ORDER — TRIAMCINOLONE ACETONIDE 40 MG/ML
40 INJECTION, SUSPENSION INTRA-ARTICULAR; INTRAMUSCULAR ONCE
Status: DISCONTINUED | OUTPATIENT
Start: 2022-12-21 | End: 2022-12-21

## 2022-12-21 RX ORDER — LIDOCAINE HYDROCHLORIDE 20 MG/ML
4 INJECTION, SOLUTION EPIDURAL; INFILTRATION; INTRACAUDAL; PERINEURAL ONCE
Status: COMPLETED | OUTPATIENT
Start: 2022-12-21 | End: 2022-12-21

## 2022-12-21 RX ORDER — TRIAMCINOLONE ACETONIDE 40 MG/ML
40 INJECTION, SUSPENSION INTRA-ARTICULAR; INTRAMUSCULAR ONCE
Status: COMPLETED | OUTPATIENT
Start: 2022-12-21 | End: 2022-12-21

## 2022-12-21 RX ADMIN — LIDOCAINE HYDROCHLORIDE 4 ML: 20 INJECTION, SOLUTION EPIDURAL; INFILTRATION; INTRACAUDAL; PERINEURAL at 09:35

## 2022-12-21 RX ADMIN — TRIAMCINOLONE ACETONIDE 40 MG: 40 INJECTION, SUSPENSION INTRA-ARTICULAR; INTRAMUSCULAR at 09:35

## 2022-12-21 ASSESSMENT — FIBROSIS 4 INDEX: FIB4 SCORE: 0.87

## 2022-12-21 NOTE — LETTER
[facility]  ELLEN Gomez  [address]  [facility] [address]  Fax: [phone]   Authorization for Release/Disclosure of   Protected Health Information   Name: SHANNAN MACIEL : 1950 SSN: [id number]   Address: [address] [place], [place] [address] [address] Phone:    [phone] (home)    I authorize the entity listed below to release/disclose the PHI below to:   [facility]/ELLEN Gomez and ELLEN Gomez   Provider or Entity Name: [facility]      Address   City, State, [facility]   Phone:      Fax:     Reason for request: continuity of care   Information to be released:    [  ] LAST COLONOSCOPY,  including any PATH REPORT and follow-up  [  ] LAST FIT/COLOGUARD RESULT [  ] LAST DEXA  [  ] LAST MAMMOGRAM  [  ] LAST PAP  [  ] LAST LABS [  ] RETINA EXAM REPORT  [  ] IMMUNIZATION RECORDS  [  ] Release all info      [  ] Check here and initial the line next to each item to release ALL health information INCLUDING  _____ Care and treatment for drug and / or alcohol abuse  _____ HIV testing, infection status, or AIDS  _____ Genetic Testing    DATES OF SERVICE OR TIME PERIOD TO BE DISCLOSED: _____________  I understand and acknowledge that:  * This Authorization may be revoked at any time by you in writing, except if your health information has already been used or disclosed.  * Your health information that will be used or disclosed as a result of you signing this authorization could be re-disclosed by the recipient. If this occurs, your re-disclosed health information may no longer be protected by State or Federal laws.  * You may refuse to sign this Authorization. Your refusal will not affect your ability to obtain treatment.  * This Authorization becomes effective upon signing and will  on (date) __________.      If no date is indicated, this Authorization will  one (1) year from the signature date.    Name: Shannan Maciel    Signature:   Date:      12/21/2022       PLEASE FAX REQUESTED RECORDS BACK TO: [phone]

## 2022-12-25 PROBLEM — I89.0 LYMPHEDEMA OF BOTH LOWER EXTREMITIES: Status: ACTIVE | Noted: 2021-03-03

## 2022-12-25 PROBLEM — M19.012 PRIMARY OSTEOARTHRITIS OF LEFT SHOULDER: Status: ACTIVE | Noted: 2022-12-19

## 2022-12-25 NOTE — PROGRESS NOTES
CC: The encounter diagnosis was Primary osteoarthritis of left shoulder.                                                                                                                                       HPI:   Shannan presents today with the following concerns:    Osteoarthritis of left shoulder  Follows up today to review left shoulder x-ray which shows osteoarthritis.  Patient is interested in steroid injection today.  12/19/2022 9:40 AM     HISTORY/REASON FOR EXAM:  Left shoulder pain     TECHNIQUE/EXAM DESCRIPTION AND NUMBER OF VIEWS:  3 views of the LEFT shoulder.     COMPARISON: None     FINDINGS:  Bone mineralization is normal.  There is no evidence of acute fracture.  There is no evidence of dislocation.  There is moderate joint space narrowing periarticular sclerosis and marginal spurring.  No abnormalities are identified in the soft tissues.     IMPRESSION:     There is no evidence of acute fracture.  Findings are consistent with osteoarthritis.    Patient Active Problem List    Diagnosis Date Noted    Osteoarthritis of left shoulder 12/19/2022    Overweight (BMI 25.0-29.9) 08/17/2022    Urinary frequency 07/26/2022    Bilateral primary osteoarthritis of knee 01/31/2022    Paronychia of great toe, right 08/17/2021    Lymphedema of both lower extremities 03/03/2021    Primary osteoarthritis of right shoulder 03/03/2021    Varicose vein of leg 08/10/2018    Gastroesophageal reflux disease without esophagitis 12/22/2017    Thyroid mass 06/23/2017    Vitamin D insufficiency 06/22/2017    OM (onychomycosis) 06/22/2017    AK (actinic keratosis) 06/23/2016    Chronic pain of right knee 06/23/2016    Mixed hyperlipidemia 06/23/2016    Right-sided thoracic back pain 06/23/2016    Heart murmur     Osteopenia of multiple sites      Current Outpatient Medications   Medication Sig Dispense Refill    terbinafine (LAMISIL) 250 MG Tab Take 1 Tablet by mouth every day for 90 days. 90 Tablet 0    atorvastatin (LIPITOR) 20  "MG Tab Take 1 Tablet by mouth every evening. 100 Tablet 3    meloxicam (MOBIC) 15 MG tablet Take 1 Tablet by mouth 1 time a day as needed for Moderate Pain. 90 Tablet 1    triamcinolone acetonide (KENALOG) 0.1 % Cream Apply 1 Application to affected area(s) 2 times a day as needed. 1 Tube 11    Omega-3 Fatty Acids (OMEGA 3 500 PO) Take 1,000 mg by mouth.      famotidine (PEPCID) 20 MG Tab Take 1 Tab by mouth 2 times a day. 60 Tab 0    Calcium Carbonate 600 MG Tab Take 1,200 mg by mouth every day.      Cholecalciferol (VITAMIN D3) 5000 units Tab Take 5,000 Units by mouth every day.       No current facility-administered medications for this visit.     Allergies as of 12/21/2022 - Reviewed 12/21/2022   Allergen Reaction Noted    Pcn [penicillins] Hives 06/09/2011      ROS:  See HPI    /78 (BP Location: Left arm, Patient Position: Sitting, BP Cuff Size: Adult)   Pulse 78   Temp 36.7 °C (98 °F) (Temporal)   Resp 16   Ht 1.651 m (5' 5\")   Wt 81.2 kg (179 lb)   LMP 01/01/2006 (Within Years)   SpO2 98%   BMI 29.79 kg/m²     Physical Exam:  General: Well nourished, well developed female in NAD, awake and conversant.  Eyes: Normal conjunctiva, anicteric.  Round symmetrical pupils.  ENT: Hearing grossly intact.  No nasal discharge.  Neck: Neck is supple.  No masses or thyromegaly.  CV: No lower extremity edema.  Respiratory: Respirations are nonlabored.  No wheezing.  Abdomen: Non-Distended.  Skin: Warm.  No rashes or ulcers.  MSK: Normal ambulation.  No clubbing or cyanosis.  Neuro: Sensation and CN II-XII grossly normal.  Psych: Alert and oriented.  Cooperative, appropriate mood and affect, normal judgment.      Assessment and Plan.   72 y.o. female with the following issues:  1. Primary osteoarthritis of left shoulder  PROCEDURE NOTE.  Discussed risk and benefit and patient agrees to kenalog injection of left shoulder. Informed consent obtained.  The joint was prepped with betadine. A 25 guage needle was " inserted into the posterior left shoulder joint.   4 ml of 2% lidocaine without epi and 1 ml of kenalog 40mg/ml was then injected into the joint space.   The area was cleaned with alcohol swab and band-aid was applied. Patient tolerated procedure well with no complications   - lidocaine PF (XYLOCAINE-MPF) 2 % injection PF 4 mL  - Consent for Non-Surgery w/o Sedation  - triamcinolone acetonide (KENALOG-40) injection 40 mg     Return if symptoms worsen or fail to improve.     Please note that this dictation was created using voice recognition software. I have worked with consultants from the vendor as well as technical experts from [facility] to optimize the interface. I have made every reasonable attempt to correct obvious errors, but I expect that there are errors of grammar and possibly content that I did not discover before finalizing the note.

## 2022-12-25 NOTE — ASSESSMENT & PLAN NOTE
Follows up today to review left shoulder x-ray which shows osteoarthritis.  Patient is interested in steroid injection today.  12/19/2022 9:40 AM     HISTORY/REASON FOR EXAM:  Left shoulder pain     TECHNIQUE/EXAM DESCRIPTION AND NUMBER OF VIEWS:  3 views of the LEFT shoulder.     COMPARISON: None     FINDINGS:  Bone mineralization is normal.  There is no evidence of acute fracture.  There is no evidence of dislocation.  There is moderate joint space narrowing periarticular sclerosis and marginal spurring.  No abnormalities are identified in the soft tissues.     IMPRESSION:     There is no evidence of acute fracture.  Findings are consistent with osteoarthritis.

## 2022-12-25 NOTE — ASSESSMENT & PLAN NOTE
Chronic, ongoing.  Up-to-date on blood work.  Requesting refill for terbinafine to be sent to pharmacy for ongoing fungal infection of left great toe.

## 2022-12-25 NOTE — PROGRESS NOTES
CC: Diagnoses of Acute pain of left shoulder, Lymphedema of both lower extremities, and OM (onychomycosis) were pertinent to this visit.                                                                                                                                       HPI:   Shannan presents today with the following concerns:    Acute pain of left shoulder  New to examiner.  Patient reports that she had sudden left shoulder pain that occurred just before Thanksgiving.  Reports that she has to lift her left arm with her right arm, she is unable to lift independently.  Holding her left upper extremity forward while driving causes pain.  She has had to change the way she dresses her self due to this pain.  She has tried ibuprofen, meloxicam, heating pads, Salonpas patches, and topical Bengay.    Lymphedema of both lower extremities  Chronic, ongoing.  The patient was seen at [facility] for bilateral lower extremity leg pain and swelling.  Reports that she has not been walking much or wearing compression stockings and she is experiencing bilateral lower extremity edema again.  Elevation of the lower extremities does help.  Reports that she has been seen at [facility] lymphedema clinic during the summer 2021, for approximately 6 visits.  Reports that she has not been doing the lymphedema massage due to confusion of how to do the massage as well as acute left shoulder pain.    OM (onychomycosis)  Chronic, ongoing.  Up-to-date on blood work.  Requesting refill for terbinafine to be sent to pharmacy for ongoing fungal infection of left great toe.    Patient Active Problem List    Diagnosis Date Noted    Acute pain of left shoulder 12/19/2022    Overweight (BMI 25.0-29.9) 08/17/2022    Urinary frequency 07/26/2022    Bilateral primary osteoarthritis of knee 01/31/2022    Paronychia of great toe, right 08/17/2021    Lymphedema of both lower extremities 03/03/2021    Primary osteoarthritis of right shoulder 03/03/2021     "Varicose vein of leg 08/10/2018    Gastroesophageal reflux disease without esophagitis 12/22/2017    Thyroid mass 06/23/2017    Vitamin D insufficiency 06/22/2017    OM (onychomycosis) 06/22/2017    AK (actinic keratosis) 06/23/2016    Chronic pain of right knee 06/23/2016    Mixed hyperlipidemia 06/23/2016    Right-sided thoracic back pain 06/23/2016    Heart murmur     Osteopenia of multiple sites      Current Outpatient Medications   Medication Sig Dispense Refill    terbinafine (LAMISIL) 250 MG Tab Take 1 Tablet by mouth every day for 90 days. 90 Tablet 0    atorvastatin (LIPITOR) 20 MG Tab Take 1 Tablet by mouth every evening. 100 Tablet 3    meloxicam (MOBIC) 15 MG tablet Take 1 Tablet by mouth 1 time a day as needed for Moderate Pain. 90 Tablet 1    triamcinolone acetonide (KENALOG) 0.1 % Cream Apply 1 Application to affected area(s) 2 times a day as needed. 1 Tube 11    Omega-3 Fatty Acids (OMEGA 3 500 PO) Take 1,000 mg by mouth.      famotidine (PEPCID) 20 MG Tab Take 1 Tab by mouth 2 times a day. 60 Tab 0    Calcium Carbonate 600 MG Tab Take 1,200 mg by mouth every day.      Cholecalciferol (VITAMIN D3) 5000 units Tab Take 5,000 Units by mouth every day.       No current facility-administered medications for this visit.     Allergies as of 12/19/2022 - Reviewed 12/19/2022   Allergen Reaction Noted    Pcn [penicillins] Hives 06/09/2011      ROS:  See HPI    /78 (BP Location: Right arm, Patient Position: Sitting, BP Cuff Size: Large adult)   Pulse 77   Temp 36.5 °C (97.7 °F) (Temporal)   Resp 16   Ht 1.626 m (5' 4\")   Wt 81.2 kg (179 lb)   LMP 01/01/2006 (Within Years)   SpO2 98%   BMI 30.73 kg/m²     Physical Exam:  General: Well nourished, well developed female in NAD, awake and conversant.  Eyes: Normal conjunctiva, anicteric.  Round symmetrical pupils.  ENT: Hearing grossly intact.  No nasal discharge.  Neck: Neck is supple.  No masses or thyromegaly.  CV: Bilateral lower extremity edema " generalized, 2+ pitting.  Respiratory: Respirations are nonlabored.  No wheezing.  Abdomen: Non-Distended.  Skin: Warm.  No rashes or ulcers.  MSK: Normal ambulation.  No clubbing or cyanosis.  Left shoulder: shoulder appears normal  non-specific diffuse tenderness about the shoulder  positive impingement sign  distal neuromuscular exam negative  does not have  full ROM  sensory exam normal  motor exam normal  radial pulse intact   Neuro: Sensation and CN II-XII grossly normal.  Psych: Alert and oriented.  Cooperative, appropriate mood and affect, normal judgment.      Assessment and Plan.   72 y.o. female with the following issues:  1. Acute pain of left shoulder  New to examiner and patient.  Pain started just prior to Thanksgiving.  Plan to have patient complete left shoulder x-ray, will notify her of results through [facility] when received.  - DX-SHOULDER 2+ LEFT; Future    2. Lymphedema of both lower extremities  New to examiner, chronic for patient.  Referral to lymphedema clinic.  Encourage patient to continue elevating lower extremities, walk often, wear compression stockings.  - REFERRAL TO LYMPhEDEMA-PHYSICAL THERAPY    3. OM (onychomycosis)  Chronic, ongoing.  Refill for terbinafine 250 mg 1 tablet 1 time daily for 90 days at pharmacy.  - terbinafine (LAMISIL) 250 MG Tab; Take 1 Tablet by mouth every day for 90 days.  Dispense: 90 Tablet; Refill: 0     Return for Follow up Diagnostics.     Please note that this dictation was created using voice recognition software. I have worked with consultants from the vendor as well as technical experts from [facility] [employer] to optimize the interface. I have made every reasonable attempt to correct obvious errors, but I expect that there are errors of grammar and possibly content that I did not discover before finalizing the note.

## 2022-12-25 NOTE — ASSESSMENT & PLAN NOTE
Chronic, ongoing.  The patient was seen at [facility] for bilateral lower extremity leg pain and swelling.  Reports that she has not been walking much or wearing compression stockings and she is experiencing bilateral lower extremity edema again.  Elevation of the lower extremities does help.  Reports that she has been seen at [facility] lymphedema clinic during the summer 2021, for approximately 6 visits.  Reports that she has not been doing the lymphedema massage due to confusion of how to do the massage as well as acute left shoulder pain.

## 2022-12-28 ENCOUNTER — PHYSICAL THERAPY (OUTPATIENT)
Dept: PHYSICAL THERAPY | Facility: REHABILITATION | Age: 72
End: 2022-12-28
Attending: NURSE PRACTITIONER
Payer: MEDICARE

## 2022-12-28 DIAGNOSIS — M79.89 LEG SWELLING: ICD-10-CM

## 2022-12-28 PROCEDURE — 97161 PT EVAL LOW COMPLEX 20 MIN: CPT

## 2022-12-28 NOTE — OP THERAPY EVALUATION
"  Outpatient Physical Therapy  LYMPHEDEMA THERAPY INITIAL EVALUATION    [facility] [place] [address] [facility]  [address]  [facility] [address]  Phone:  [phone]  Fax:  [phone]    Date of Evaluation: 12/28/2022    Patient: Shannan Dove  YOB: 1950  MRN: [id number]     Referring Provider: [place] [address]   Referring Diagnosis Lymphedema of both lower extremities [I89.0]     Time Calculation    Start time: 1503  Stop time: 1546 Time Calculation (min): 43 minutes             Chief Complaint: Lipolymphedema    Visit Diagnoses     ICD-10-CM   1. Leg swelling  M79.89       Subjective:   History of Present Illness:     Mechanism of injury:  Shannan reports she was previously seen by lymphedema therapist and has a DVD with Lisbeth for self-MLD. She was seen at a clinic near [facility]     Pt reports she has had large calves since she had her second daughter. Was previously \"always skinny\". She also has pitting edema to her shins. Also reports a hx of achy legs; had RFA in the past at [facility] [place]; around a year later, her leg was still painful. Sometimes the back of her legs feel like someone is pushing on her legs. Reports her mother had similarly shaped legs (\"the same as my mom\").   Pt reports every once in a while her feet are puffy.     Past Medical History:   Diagnosis Date    Heart murmur     Hyperglycemia 12/27/2018    Hyperlipidemia     Leucocytosis 12/27/2018    Post menopausal problems     Varicose veins of both legs with edema     Vitamin D deficiency      Past Surgical History:   Procedure Laterality Date    GYN SURGERY  10/25/2022    Endometrial biopsy    TONSILLECTOMY       Social History     Tobacco Use    Smoking status: Never    Smokeless tobacco: Never   Substance Use Topics    Alcohol use: No     Family and Occupational History     Socioeconomic History    Marital status:      Spouse name: Not on file    Number " "of children: Not on file    Years of education: Not on file    Highest education level: Bachelor's degree (e.g., BA, AB, BS)   Occupational History    Not on file       Lymphedema Objective      Other Notes  Will measure next session; deferred for education.      Therapeutic Treatments and Modalities:     Therapeutic Treatment and Modalities Summary: Educated patient on pathogenesis of lymphedema. Distributed brochure. Patient has also been educated on skin care precautions including hygiene, lotions with pH 5-5.5, and avoidance of lacerations/mosquito bites/heat. Also educated pt on signs/symptoms of cellulitis.        Discussed the need for external compression and educated pt regarding compression garment options.  Patient was educated the optimal use of garment (all day, every day, especially during higher risk activities as discussed, remove at night).  Lymphedema risk factors were discussed with a lymphedema \"Do's and Don'ts\" handout provided.  Patient verbalized understanding to all education today.     Pt has been educated on the pathogenesis of lipedema (fluid in fat) including the following: lipedema is a symmetrical, painful fat disorder affecting subcutaneous adipose tissue mostly in the hips, buttocks, and legs. Often, this fat accumulation does not respond to traditional weight loss strategies of diet and exercise. While the cause is not completely understood, current thinking is that hormonal changes impact the quality of blood vessels and lymphatics such that they become leaky. It is therefore common to feel as though the limb is painful and heavy due to the accumulation of fat and fluid. In addition, sometimes limbs can easily bruise. Informational brochure distributed.      Discussed recommendation for a Flexitouch for continued maintenance of decreased swelling after the initial course of decongestion from physical therapy to prevent return of swelling into the tissues. Because the lymphatic " system is located superficially and is already damaged from long-term swelling, a traditional pump will be inadequate. A traditional pump would cause further damage to the lymphatic limb as the compression will damage the already fragile and intact lymphatic vessels. Patient will be unable to tolerate the high pressures of a basic pump. A basic pump will exacerbate the condition and cause intolerable pain due to the superficial and sensitive nature of lymphatic vessels. A Flexitouch works like manual lymphatic drainage, which clears the unaffected areas first to allow the swollen region to have tissue space to push the fluid and protein to. The Flexitouch also has a more progressive, segmental, and lighter compression than a traditional pump. Therefore, it is able to assist protein to move out of the affected tissue.    Time-based treatments/modalities:           Assessment and Plan:   Functional Impairments: swelling    Assessment details:  Shannan arrives today with report of swelling to her B LE and a large increase in size of her calves following her second daughter's birth over thirty years ago. She reports her mother with similar leg type as well as difficulty with removing fat from her B LE. Per her description and visual appearance, it is likely that Shannan demonstrates symptoms of lipedema. Her recent, inconsistent, foot swelling does appear to be early stage lymphedema. 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  Lymphedema is characterized by high protein edema in the interstitial tissues causing damage to the lymph transport system and resulting in decreased transport capacity of the lymphatic system.  Patient has tried elevation, self OTC compression garments, diuresis and a low sodium diet, all of which were unsuccessful at treating their swelling.  Skilled lymphedema treatment is unable to be carried out by the patient and unskilled caregivers. Services will be provided by  a certified lymphedema therapist.  Complete decongestive therapy is considered the gold standard of medical practice for lymphedema treatment and has proven to be effective for reduction in limb volume size and decrease risk of complications secondary to swelling (such as wounds and infections).    Patient to be seen until decongestion occurs. Physical therapy interventions to include manual lymphatic drainage, compression bandaging, skin care education, wound care if applicable, therapeutic exercises, custom garment fitting and lymphedema education to improve skin integrity, decrease lymphedema swelling, improve joint arthrokinematics and range of motion and improve quality of life.    Spontaneous recovery is not expected without skilled completed decongestive lymphedema therapy.    The patient was informed of evaluation findings and intervention plan and agrees to participate in the plan as outlined.     Goals:   Short Term Goals:  1. Pt will achieve a total limb circumference reduction of 4cm in order to decrease risk for infection and progression of disease process.  2. Pt will be independent with self-MLD to facilitate fluid migration to healthy tissues and lymph nodes.   3. Pt will consistently perform exercises with bandages on to facilitate muscle pump of fluid from affected extremity.   4. Pt will obtain compression garments and wear to reduce leg swelling and improve achy sensation.   Short term goal time span:  2-4 weeks    Long Term Goals:  1. Pt will have a reduction in total limb circumference of 7cm in order to decrease risk for infection and progression of disease process.   2. Patient will be independent with maintenance phase management of lymphedema including: compression garments, skin care education, risk reduction strategies, manual lymphatic drainage, and therapeutic exercise.   Long term goal time span:  4-6 weeks    Plan:  Therapy options:  Physical therapy treatment to continue  Planned  therapy interventions:  Caregiver education, compression bandaging, compression stocking, decongestive exercises, home exercise program, intermittent compression, manual lymph drainage, Velcro wraps, strengthening exercises, soft tissue manual techniques (CPT 75497), skin/wound care, short stretch bandages, sequential compression pump, self-care/training (CPT 41175), referral for compression garment & instructions for don/doffing, range of motion exercises, postural exercises, patient education, orthotic measurements/fitting and myofascial release techniques  Planned education:  Functional anatomy and physiology of the lymphatic system, pathophysiology of lymphedema, lymphedema exercise, lymphedema precautions, proper skin care/nutrition, compression bandaging, self massage, infection prevention, long term self-management of lymphedema, bandage removal, home pump use, skin care guidelines, dietary guidelines, activity guidelines and scar tissue management  Frequency:  2x week  Duration in weeks:  8  Discussed with:  Patient    Functional Assessment Used    LLIS    Referring provider co-signature:  I have reviewed this plan of care and my co-signature certifies the need for services.    Certification Period: 12/28/2022 to  02/22/23    Physician Signature: ________________________________ Date: ______________

## 2023-01-03 ENCOUNTER — APPOINTMENT (OUTPATIENT)
Dept: PHYSICAL THERAPY | Facility: REHABILITATION | Age: 73
End: 2023-01-03
Attending: NURSE PRACTITIONER
Payer: MEDICARE

## 2023-01-03 NOTE — OP THERAPY DAILY TREATMENT
Outpatient Physical Therapy  LYMPHEDEMA THERAPY DAILY TREATMENT     [facility] [address].  [address]  [place] [address]  Phone:  [phone]  Fax:  [phone]    Date: 01/03/2023    Patient: Shannan Dove  YOB: 1950  MRN: [id number]     Time Calculation                   Chief Complaint: No chief complaint on file.    Visit #: 5    Subjective    Lymphedema Objective      Exercises/Treatment  Time-based treatments/modalities:           LYMPHEDEMA ASSESSMENT AND PLAN

## 2023-01-12 ENCOUNTER — PHYSICAL THERAPY (OUTPATIENT)
Dept: PHYSICAL THERAPY | Facility: REHABILITATION | Age: 73
End: 2023-01-12
Attending: NURSE PRACTITIONER
Payer: MEDICARE

## 2023-01-12 DIAGNOSIS — M79.89 LEG SWELLING: ICD-10-CM

## 2023-01-12 PROCEDURE — 29581 APPL MULTLAYER CMPRN SYS LEG: CPT

## 2023-01-12 PROCEDURE — 97140 MANUAL THERAPY 1/> REGIONS: CPT

## 2023-01-12 PROCEDURE — 97535 SELF CARE MNGMENT TRAINING: CPT

## 2023-01-12 NOTE — OP THERAPY DAILY TREATMENT
Outpatient Physical Therapy  LYMPHEDEMA THERAPY DAILY TREATMENT     [facility] [address].  [address]  [place] [address]  Phone:  [phone]  Fax:  [phone]    Date: 01/12/2023    Patient: Shannan Dove  YOB: 1950  MRN: [id number]     Time Calculation    Start time: 1330  Stop time: 1450 Time Calculation (min): 80 minutes         Chief Complaint: Lipolymphedema    Visit #: 2    Subjective:   History of Present Illness:     Mechanism of injury:  Located compression garments and brought in.     Lymphedema Objective    Left Lower Extremity Circumferential Measurements  Waist: cm  Hip: cm  Scrotum: cm  Ground/Upper Thigh: 60.2 cm  Mid Thigh: 49.4 cm  Knee: 39.6 cm  Upper Calf: 44.6 cm  Mid Calf: 32.1 cm  Ankle: 21.5 cm  Heel to Foot: 23.4 cm  Total: 270.8 cm    Right Lower Extremity Circumferential Measurements  Waist: cm  Hip: cm  Scrotum: cm  Ground/Upper Thigh: 61.3 cm  Mid Thigh: 48.5 cm  Knee: 39.6 cm  Upper Calf: 45.6 cm  Mid Calf: 30.8 cm  Ankle: 21.7 cm  Heel to Foot: 23.1 cm  Total: 270.6 cm          Therapeutic Treatments and Modalities:     1. Self Care ADL Training (CPT 98476), Evaluated pt's current compression garments including old [employer] medical grade as well as leggings from [employer]. Discussed compression options for long-term. Educated pt on lifespan of garments.    2. Manual Therapy (CPT 72959)    Therapeutic Treatment and Modalities Summary: MLD to B LE without trunk involvement. Focus on B LE and accompanying anastomoses. Reviewed self-MLD.   The purpose of Manual Lymph Drainage (MLD) is to reduce lymph volume in the affected limb by increasing intake of lymphatic load into the lymphatic system, increasing the volume of transported lymph fluid, moving lymph fluid in superficial lymph vessels to collateral lymph collectors, anastomoses, or tissue channels, and increasing venous return. The goal of MLD is to re-route the lymph flow around  blocked areas into more centrally located healthy lymph vessels, which drain into the venous system.      Multilayer compression applied to distal B LE.   Time-based treatments/modalities:    Physical Therapy Timed Treatment Charges  Functional training, self care minutes (CPT 93852): 30 minutes  Manual therapy minutes (CPT 14248): 50 minutes      Assessment and Plan:   Assessment details:  Today was pt's first day of CDT. Pt understands to unwrap should she experience numbness/large quantity of pain to B LE. Pt has been educated on how to check for capillary refill. Pt also understands importance of maintaining compression and alternatives to bathing. Will benefit from return to clinic to assess previous treatment results and achieve further reduction to B LE.     Plan:  Therapy options:  Physical therapy treatment to continue  Discussed with:  Patient

## 2023-01-19 ENCOUNTER — PHYSICAL THERAPY (OUTPATIENT)
Dept: PHYSICAL THERAPY | Facility: REHABILITATION | Age: 73
End: 2023-01-19
Attending: NURSE PRACTITIONER
Payer: MEDICARE

## 2023-01-19 DIAGNOSIS — M79.89 LEG SWELLING: ICD-10-CM

## 2023-01-19 PROCEDURE — 97535 SELF CARE MNGMENT TRAINING: CPT

## 2023-01-19 NOTE — OP THERAPY DAILY TREATMENT
Outpatient Physical Therapy  LYMPHEDEMA THERAPY DAILY TREATMENT     [facility] Physical Therapy [address].  [address]  [place] [address]  Phone:  [phone]  Fax:  [phone]    Date: 01/19/2023    Patient: Shannan Dove  YOB: 1950  MRN: [id number]     Time Calculation    Start time: 1331  Stop time: 1430 Time Calculation (min): 59 minutes         Chief Complaint: Lipolymphedema    Visit #: 3    Subjective:   History of Present Illness:     Mechanism of injury:  Pt's spouse able to re-wrap. Took measurements. Has been wearing her thigh-highs daily since she removed wraps on Friday.     Lymphedema Objective    Left Lower Extremity Circumferential Measurements  Waist: cm  Hip: cm  Scrotum: cm  Ground/Upper Thigh: 58.7 cm  Mid Thigh: 47.5 cm  Knee: 39.5 cm  Upper Calf: 45.2 cm  Mid Calf: 28.6 cm  Ankle: 21.5 cm  Heel to Foot: 23 cm  Total: 264 cm    Right Lower Extremity Circumferential Measurements  Waist: cm  Hip: cm  Scrotum: cm  Ground/Upper Thigh: 58.3 cm  Mid Thigh: 47.6 cm  Knee: 39.8 cm  Upper Calf: 45.8 cm  Mid Calf: 27.7 cm  Ankle: 21.2 cm  Heel to Foot: 22.8 cm  Total: 263.2 cm      Left Lower Extremity Circumferential Measurements 1/12  Waist: cm  Hip: cm  Scrotum: cm  Ground/Upper Thigh: 60.2 cm  Mid Thigh: 49.4 cm  Knee: 39.6 cm  Upper Calf: 44.6 cm  Mid Calf: 32.1 cm  Ankle: 21.5 cm  Heel to Foot: 23.4 cm  Total: 270.8 cm     Right Lower Extremity Circumferential Measurements 1/12  Waist: cm  Hip: cm  Scrotum: cm  Ground/Upper Thigh: 61.3 cm  Mid Thigh: 48.5 cm  Knee: 39.6 cm  Upper Calf: 45.6 cm  Mid Calf: 30.8 cm  Ankle: 21.7 cm  Heel to Foot: 23.1 cm  Total: 270.6 cm    Therapeutic Treatments and Modalities:     1. Self Care ADL Training (CPT 51929)    Therapeutic Treatment and Modalities Summary: -discussed wear time for thigh-highs (daily, remove at night). Can wear [employer] leggings at night. Can wear thigh highs plus leggings.   -educated pt on lifespan of  compression garments given wear time and wash schedule.  -briefly reviewed benefits of pump.   -answered miscellaneous questions to pt's comfort  Time-based treatments/modalities:    Physical Therapy Timed Treatment Charges  Functional training, self care minutes (CPT 06656): 30 minutes      Assessment and Plan:   Assessment details:  Shannan reduced her B LE by 7cm since previous visit with consistent use of compression. Discussed benefit of obtaining new garments and continued wear. Should she like to return for follow up to ensure garments are adequate, she may. Otherwise, will likely DC.     Plan:  Therapy options:  Physical therapy treatment to continue  Discussed with:  Patient

## 2023-03-24 ENCOUNTER — HOSPITAL ENCOUNTER (OUTPATIENT)
Dept: LAB | Facility: MEDICAL CENTER | Age: 73
End: 2023-03-24
Attending: NURSE PRACTITIONER
Payer: MEDICARE

## 2023-03-24 DIAGNOSIS — E78.2 MIXED HYPERLIPIDEMIA: ICD-10-CM

## 2023-03-24 PROCEDURE — 80061 LIPID PANEL: CPT

## 2023-03-24 PROCEDURE — 36415 COLL VENOUS BLD VENIPUNCTURE: CPT

## 2023-03-24 PROCEDURE — 80053 COMPREHEN METABOLIC PANEL: CPT

## 2023-03-25 LAB
ALBUMIN SERPL BCP-MCNC: 4.9 G/DL (ref 3.2–4.9)
ALBUMIN/GLOB SERPL: 2 G/DL
ALP SERPL-CCNC: 51 U/L (ref 30–99)
ALT SERPL-CCNC: 37 U/L (ref 2–50)
ANION GAP SERPL CALC-SCNC: 13 MMOL/L (ref 7–16)
AST SERPL-CCNC: 21 U/L (ref 12–45)
BILIRUB SERPL-MCNC: 0.6 MG/DL (ref 0.1–1.5)
BUN SERPL-MCNC: 18 MG/DL (ref 8–22)
CALCIUM ALBUM COR SERPL-MCNC: 9.3 MG/DL (ref 8.5–10.5)
CALCIUM SERPL-MCNC: 10 MG/DL (ref 8.5–10.5)
CHLORIDE SERPL-SCNC: 101 MMOL/L (ref 96–112)
CHOLEST SERPL-MCNC: 147 MG/DL (ref 100–199)
CO2 SERPL-SCNC: 24 MMOL/L (ref 20–33)
CREAT SERPL-MCNC: 0.71 MG/DL (ref 0.5–1.4)
FASTING STATUS PATIENT QL REPORTED: NORMAL
GFR SERPLBLD CREATININE-BSD FMLA CKD-EPI: 91 ML/MIN/1.73 M 2
GLOBULIN SER CALC-MCNC: 2.5 G/DL (ref 1.9–3.5)
GLUCOSE SERPL-MCNC: 92 MG/DL (ref 65–99)
HDLC SERPL-MCNC: 50 MG/DL
LDLC SERPL CALC-MCNC: 75 MG/DL
POTASSIUM SERPL-SCNC: 3.8 MMOL/L (ref 3.6–5.5)
PROT SERPL-MCNC: 7.4 G/DL (ref 6–8.2)
SODIUM SERPL-SCNC: 138 MMOL/L (ref 135–145)
TRIGL SERPL-MCNC: 112 MG/DL (ref 0–149)

## 2023-03-27 ENCOUNTER — TELEPHONE (OUTPATIENT)
Dept: MEDICAL GROUP | Facility: PHYSICIAN GROUP | Age: 73
End: 2023-03-27
Payer: MEDICARE

## 2023-03-27 NOTE — TELEPHONE ENCOUNTER
Future Appointments         Provider Department Center    4/3/2023 7:20 AM (Arrive by 7:05 AM) ELLEN Gomez [facility] [place]          ESTABLISHED PATIENT PRE-VISIT PLANNING     Patient was NOT contacted to complete PVP.     Note: Patient will not be contacted if there is no indication to call.     1.  Reviewed notes from the last few office visits within the medical group: Yes, LOV 12/21/23    2.  If any orders were placed at last visit or intended to be done for this visit (i.e. 6 mos follow-up), do we have Results/Consult Notes?           Labs - Labs ordered, completed on 03/25/2023 and results are in chart.  Note: If patient appointment is for lab review and patient did not complete labs, check with provider if OK to reschedule patient until labs completed.         Imaging - Imaging was not ordered at last office visit.         Referrals - Referral ordered, patient was seen and consult notes are in chart. Care Teams updated  YES.    3. Is this appointment scheduled as a Hospital Follow-Up? No    4.  Immunizations were updated in Epic using Reconcile Outside Information activity? Yes    5.  Patient is due for the following Health Maintenance Topics:   Health Maintenance Due   Topic Date Due    Annual Wellness Visit  10/14/2022    COVID-19 Vaccine (5 - Booster for Moderna series) 12/29/2022     6.  AHA (Pulse8) form printed for Provider? N/A

## 2023-04-03 ENCOUNTER — OFFICE VISIT (OUTPATIENT)
Dept: MEDICAL GROUP | Facility: PHYSICIAN GROUP | Age: 73
End: 2023-04-03
Payer: MEDICARE

## 2023-04-03 VITALS
TEMPERATURE: 98.3 F | SYSTOLIC BLOOD PRESSURE: 118 MMHG | RESPIRATION RATE: 16 BRPM | OXYGEN SATURATION: 97 % | DIASTOLIC BLOOD PRESSURE: 70 MMHG | HEART RATE: 81 BPM | BODY MASS INDEX: 29.99 KG/M2 | WEIGHT: 180 LBS | HEIGHT: 65 IN

## 2023-04-03 DIAGNOSIS — Z00.00 ROUTINE HEALTH MAINTENANCE: ICD-10-CM

## 2023-04-03 DIAGNOSIS — E78.2 MIXED HYPERLIPIDEMIA: ICD-10-CM

## 2023-04-03 DIAGNOSIS — E66.09 CLASS 1 OBESITY DUE TO EXCESS CALORIES WITH SERIOUS COMORBIDITY AND BODY MASS INDEX (BMI) OF 30.0 TO 30.9 IN ADULT: ICD-10-CM

## 2023-04-03 DIAGNOSIS — E55.9 VITAMIN D INSUFFICIENCY: ICD-10-CM

## 2023-04-03 DIAGNOSIS — I89.0 LYMPHEDEMA OF BOTH LOWER EXTREMITIES: ICD-10-CM

## 2023-04-03 PROBLEM — E66.811 CLASS 1 OBESITY DUE TO EXCESS CALORIES WITH SERIOUS COMORBIDITY AND BODY MASS INDEX (BMI) OF 30.0 TO 30.9 IN ADULT: Status: ACTIVE | Noted: 2022-08-17

## 2023-04-03 PROCEDURE — 99214 OFFICE O/P EST MOD 30 MIN: CPT | Performed by: NURSE PRACTITIONER

## 2023-04-03 ASSESSMENT — PATIENT HEALTH QUESTIONNAIRE - PHQ9: CLINICAL INTERPRETATION OF PHQ2 SCORE: 0

## 2023-04-03 ASSESSMENT — FIBROSIS 4 INDEX: FIB4 SCORE: 1.02

## 2023-04-03 NOTE — ASSESSMENT & PLAN NOTE
Chronic, ongoing.  Patient has been working on decreasing sugar in her diet.  Will be due for annual labs in September 2023.

## 2023-04-03 NOTE — ASSESSMENT & PLAN NOTE
"Chronic, ongoing.  She is now using \"Flexitouch Compression System\" 1 hour daily.  She has noticed that at times it may feel too tight, she plans to adjust it.  She does note increased urination after using the compression.  It was recommended for her to increase to twice daily.  "

## 2023-04-03 NOTE — ASSESSMENT & PLAN NOTE
Chronic, improved.  Continues atorvastatin 20 mg daily, denies side effects of medication.  Interested in starting co-Q10 supplement.  She has also tried to cut down on her sweet and sugar intake.  Last 10-year ASCVD score 10.7% in September 2022.  The 10-year ASCVD risk score (Kiran HERNADEZ, et al., 2019) is: 8.5%

## 2023-04-03 NOTE — PROGRESS NOTES
"CC: Diagnoses of Mixed hyperlipidemia, Class 1 obesity due to excess calories with serious comorbidity and body mass index (BMI) of 30.0 to 30.9 in adult, Lymphedema of both lower extremities, Vitamin D insufficiency, and Routine health maintenance were pertinent to this visit.                                                                                                                                       HPI:   Shannan presents today with the following concerns:    Lymphedema of both lower extremities  Chronic, ongoing.  She is now using \"Flexitouch Compression System\" 1 hour daily.  She has noticed that at times it may feel too tight, she plans to adjust it.  She does note increased urination after using the compression.  It was recommended for her to increase to twice daily.    Mixed hyperlipidemia  Chronic, improved.  Continues atorvastatin 20 mg daily, denies side effects of medication.  Interested in starting co-Q10 supplement.  She has also tried to cut down on her sweet and sugar intake.  Last 10-year ASCVD score 10.7% in September 2022.  The 10-year ASCVD risk score (Kiran DK, et al., 2019) is: 8.5%     Class 1 obesity due to excess calories with serious comorbidity and body mass index (BMI) of 30.0 to 30.9 in adult  Chronic, ongoing.  Patient has been working on decreasing sugar in her diet.  Will be due for annual labs in September 2023.    Vitamin D insufficiency  Chronic, ongoing.  Continues vitamin D and calcium supplement daily.  Due for annual labs in September 2023.    Patient Active Problem List    Diagnosis Date Noted    Osteoarthritis of left shoulder 12/19/2022    Class 1 obesity due to excess calories with serious comorbidity and body mass index (BMI) of 30.0 to 30.9 in adult 08/17/2022    Urinary frequency 07/26/2022    Bilateral primary osteoarthritis of knee 01/31/2022    Paronychia of great toe, right 08/17/2021    Lymphedema of both lower extremities 03/03/2021    Primary osteoarthritis " of right shoulder 03/03/2021    Varicose vein of leg 08/10/2018    Gastroesophageal reflux disease without esophagitis 12/22/2017    Thyroid mass 06/23/2017    Vitamin D insufficiency 06/22/2017    OM (onychomycosis) 06/22/2017    AK (actinic keratosis) 06/23/2016    Chronic pain of right knee 06/23/2016    Mixed hyperlipidemia 06/23/2016    Right-sided thoracic back pain 06/23/2016    Heart murmur     Osteopenia of multiple sites      Current Outpatient Medications   Medication Sig Dispense Refill    atorvastatin (LIPITOR) 20 MG Tab Take 1 Tablet by mouth every evening. 100 Tablet 3    meloxicam (MOBIC) 15 MG tablet Take 1 Tablet by mouth 1 time a day as needed for Moderate Pain. 90 Tablet 1    triamcinolone acetonide (KENALOG) 0.1 % Cream Apply 1 Application to affected area(s) 2 times a day as needed. 1 Tube 11    Omega-3 Fatty Acids (OMEGA 3 500 PO) Take 1,000 mg by mouth.      famotidine (PEPCID) 20 MG Tab Take 1 Tab by mouth 2 times a day. 60 Tab 0    Calcium Carbonate 600 MG Tab Take 1,200 mg by mouth every day.      Cholecalciferol (VITAMIN D3) 5000 units Tab Take 5,000 Units by mouth every day.       No current facility-administered medications for this visit.     Allergies as of 04/03/2023 - Reviewed 04/03/2023   Allergen Reaction Noted    Pcn [penicillins] Hives 06/09/2011      ROS:  Constitutional: No fevers, chills, malaise/fatigue.  Eyes: No eye pain.  ENT: No sore throat, congestion.   Resp: No cough, shortness of breath.  CV: No chest pain, leg swelling, palpitations.  GI: No nausea/vomiting, abdominal pain, constipation, diarrhea.  : No dysuria, hematuria.  MSK: No weakness.  Skin: No rashes.  Neuro: No dizziness, weakness, headaches.  Psych: No suicidal ideations.    All remaining systems reviewed and found to be negative, except as stated above.      /70 (BP Location: Left arm, Patient Position: Sitting, BP Cuff Size: Adult)   Pulse 81   Temp 36.8 °C (98.3 °F) (Temporal)   Resp 16   Ht  "1.638 m (5' 4.5\")   Wt 81.6 kg (180 lb)   LMP 01/01/2006 (Within Years)   SpO2 97%   BMI 30.42 kg/m²     Physical Exam:  General: Well nourished, well developed female in NAD, awake and conversant.  Eyes: Normal conjunctiva, anicteric.  Round symmetrical pupils.  ENT: Hearing grossly intact.  No nasal discharge.  Neck: Neck is supple.  No masses or thyromegaly.  CV: No lower extremity edema.  Respiratory: Respirations are nonlabored.  No wheezing.  Abdomen: Non-Distended.  Skin: Warm.  No rashes or ulcers.  MSK: Normal ambulation.  No clubbing or cyanosis.  Neuro: Sensation and CN II-XII grossly normal.  Psych: Alert and oriented.  Cooperative, appropriate mood and affect, normal judgment.      Assessment and Plan.   72 y.o. female with the following issues:  1. Mixed hyperlipidemia  Chronic, improving.  Continue atorvastatin 20 mg daily, does not need refill at this time.  May start co-Q10 supplement if desired.  Plan to complete annual fasting labs prior to annual follow-up in September/October 2023.  - Comp Metabolic Panel; Future  - Lipid Profile; Future  - TSH WITH REFLEX TO FT4; Future    2. Class 1 obesity due to excess calories with serious comorbidity and body mass index (BMI) of 30.0 to 30.9 in adult  Encourage diet high in fruits, vegetables, and fiber. And a diet low in salt, refined carbohydrates, cholesterol, saturated fat, and trans fatty acids.    Encourage a minimum of 30 minutes of moderate intensity aerobic exercise (eg, brisk walking) is recommended on five days each week. Or 30 minutes of vigorous-intensity aerobic exercise (eg, jogging) on three days each week.   Patient's body mass index is 30.42 kg/m². Exercise and nutrition counseling were performed at this visit.  Plan to complete annual fasting labs prior to annual follow-up in September/October 2023.   - CBC WITH DIFFERENTIAL; Future  - Comp Metabolic Panel; Future  - Lipid Profile; Future  - TSH WITH REFLEX TO FT4; Future    3. " Lymphedema of both lower extremities  Chronic, ongoing. Continue using Flexitouch compression system, 1 hour twice daily once in the morning and once in mid afternoon. Plan to complete annual fasting labs prior to annual follow-up in September/October 2023.   - CBC WITH DIFFERENTIAL; Future  - Comp Metabolic Panel; Future    4. Vitamin D insufficiency  Chronic, ongoing.  Continue over-the-counter vitamin D and calcium supplement daily.P evelyn to complete annual fasting labs prior to annual follow-up in September/October 2023.   - VITAMIN D,25 HYDROXY (DEFICIENCY); Future    5. Routine health maintenance  Plan to complete annual fasting labs prior to annual follow-up in September/October 2023.   - CBC WITH DIFFERENTIAL; Future  - Comp Metabolic Panel; Future  - Lipid Profile; Future  - TSH WITH REFLEX TO FT4; Future  - VITAMIN D,25 HYDROXY (DEFICIENCY); Future     Return in about 6 months (around 10/3/2023) for AWV, Follow up Labs.     Please note that this dictation was created using voice recognition software. I have worked with consultants from the vendor as well as technical experts from [facility] [employer] to optimize the interface. I have made every reasonable attempt to correct obvious errors, but I expect that there are errors of grammar and possibly content that I did not discover before finalizing the note.

## 2023-05-05 ENCOUNTER — TELEPHONE (OUTPATIENT)
Dept: HEALTH INFORMATION MANAGEMENT | Facility: OTHER | Age: 73
End: 2023-05-05
Payer: MEDICARE

## 2023-08-01 DIAGNOSIS — B35.1 OM (ONYCHOMYCOSIS): ICD-10-CM

## 2023-08-07 RX ORDER — TERBINAFINE HYDROCHLORIDE 250 MG/1
250 TABLET ORAL
Qty: 90 TABLET | Refills: 0 | OUTPATIENT
Start: 2023-08-07

## 2023-08-08 NOTE — TELEPHONE ENCOUNTER
Requested Prescriptions     Refused Prescriptions Disp Refills    terbinafine (LAMISIL) 250 MG Tab [Pharmacy Med Name: TERBINAFINE  MG TABLET] 90 Tablet 0     Sig: TAKE 1 TABLET BY MOUTH EVERY DAY     Refused By: YUNIER QUICK     Reason for Refusal: Patient needs appointment.     ELLEN Rudd

## 2023-09-22 ENCOUNTER — HOSPITAL ENCOUNTER (OUTPATIENT)
Dept: LAB | Facility: MEDICAL CENTER | Age: 73
End: 2023-09-22
Attending: NURSE PRACTITIONER
Payer: MEDICARE

## 2023-09-22 DIAGNOSIS — I89.0 LYMPHEDEMA OF BOTH LOWER EXTREMITIES: ICD-10-CM

## 2023-09-22 DIAGNOSIS — Z00.00 ROUTINE HEALTH MAINTENANCE: ICD-10-CM

## 2023-09-22 DIAGNOSIS — E66.09 CLASS 1 OBESITY DUE TO EXCESS CALORIES WITH SERIOUS COMORBIDITY AND BODY MASS INDEX (BMI) OF 30.0 TO 30.9 IN ADULT: ICD-10-CM

## 2023-09-22 DIAGNOSIS — E78.2 MIXED HYPERLIPIDEMIA: ICD-10-CM

## 2023-09-22 DIAGNOSIS — E55.9 VITAMIN D INSUFFICIENCY: ICD-10-CM

## 2023-09-22 LAB
25(OH)D3 SERPL-MCNC: 44 NG/ML (ref 30–100)
ALBUMIN SERPL BCP-MCNC: 4.6 G/DL (ref 3.2–4.9)
ALBUMIN/GLOB SERPL: 2.2 G/DL
ALP SERPL-CCNC: 51 U/L (ref 30–99)
ALT SERPL-CCNC: 32 U/L (ref 2–50)
ANION GAP SERPL CALC-SCNC: 10 MMOL/L (ref 7–16)
AST SERPL-CCNC: 23 U/L (ref 12–45)
BASOPHILS # BLD AUTO: 0.3 % (ref 0–1.8)
BASOPHILS # BLD: 0.02 K/UL (ref 0–0.12)
BILIRUB SERPL-MCNC: 0.6 MG/DL (ref 0.1–1.5)
BUN SERPL-MCNC: 17 MG/DL (ref 8–22)
CALCIUM ALBUM COR SERPL-MCNC: 9.3 MG/DL (ref 8.5–10.5)
CALCIUM SERPL-MCNC: 9.8 MG/DL (ref 8.5–10.5)
CHLORIDE SERPL-SCNC: 105 MMOL/L (ref 96–112)
CHOLEST SERPL-MCNC: 128 MG/DL (ref 100–199)
CO2 SERPL-SCNC: 25 MMOL/L (ref 20–33)
CREAT SERPL-MCNC: 0.63 MG/DL (ref 0.5–1.4)
EOSINOPHIL # BLD AUTO: 0.19 K/UL (ref 0–0.51)
EOSINOPHIL NFR BLD: 3.3 % (ref 0–6.9)
ERYTHROCYTE [DISTWIDTH] IN BLOOD BY AUTOMATED COUNT: 41.8 FL (ref 35.9–50)
FASTING STATUS PATIENT QL REPORTED: NORMAL
GFR SERPLBLD CREATININE-BSD FMLA CKD-EPI: 94 ML/MIN/1.73 M 2
GLOBULIN SER CALC-MCNC: 2.1 G/DL (ref 1.9–3.5)
GLUCOSE SERPL-MCNC: 98 MG/DL (ref 65–99)
HCT VFR BLD AUTO: 40.3 % (ref 37–47)
HDLC SERPL-MCNC: 39 MG/DL
HGB BLD-MCNC: 13.2 G/DL (ref 12–16)
IMM GRANULOCYTES # BLD AUTO: 0.01 K/UL (ref 0–0.11)
IMM GRANULOCYTES NFR BLD AUTO: 0.2 % (ref 0–0.9)
LDLC SERPL CALC-MCNC: 69 MG/DL
LYMPHOCYTES # BLD AUTO: 3.22 K/UL (ref 1–4.8)
LYMPHOCYTES NFR BLD: 55.3 % (ref 22–41)
MCH RBC QN AUTO: 29.7 PG (ref 27–33)
MCHC RBC AUTO-ENTMCNC: 32.8 G/DL (ref 32.2–35.5)
MCV RBC AUTO: 90.6 FL (ref 81.4–97.8)
MONOCYTES # BLD AUTO: 0.46 K/UL (ref 0–0.85)
MONOCYTES NFR BLD AUTO: 7.9 % (ref 0–13.4)
NEUTROPHILS # BLD AUTO: 1.92 K/UL (ref 1.82–7.42)
NEUTROPHILS NFR BLD: 33 % (ref 44–72)
NRBC # BLD AUTO: 0 K/UL
NRBC BLD-RTO: 0 /100 WBC (ref 0–0.2)
PLATELET # BLD AUTO: 251 K/UL (ref 164–446)
PMV BLD AUTO: 10 FL (ref 9–12.9)
POTASSIUM SERPL-SCNC: 3.6 MMOL/L (ref 3.6–5.5)
PROT SERPL-MCNC: 6.7 G/DL (ref 6–8.2)
RBC # BLD AUTO: 4.45 M/UL (ref 4.2–5.4)
SODIUM SERPL-SCNC: 140 MMOL/L (ref 135–145)
TRIGL SERPL-MCNC: 98 MG/DL (ref 0–149)
TSH SERPL DL<=0.005 MIU/L-ACNC: 2.17 UIU/ML (ref 0.38–5.33)
WBC # BLD AUTO: 5.8 K/UL (ref 4.8–10.8)

## 2023-09-22 PROCEDURE — 80061 LIPID PANEL: CPT

## 2023-09-22 PROCEDURE — 36415 COLL VENOUS BLD VENIPUNCTURE: CPT

## 2023-09-22 PROCEDURE — 84443 ASSAY THYROID STIM HORMONE: CPT

## 2023-09-22 PROCEDURE — 85025 COMPLETE CBC W/AUTO DIFF WBC: CPT

## 2023-09-22 PROCEDURE — 80053 COMPREHEN METABOLIC PANEL: CPT

## 2023-09-22 PROCEDURE — 82306 VITAMIN D 25 HYDROXY: CPT

## 2023-10-01 SDOH — HEALTH STABILITY: PHYSICAL HEALTH: ON AVERAGE, HOW MANY DAYS PER WEEK DO YOU ENGAGE IN MODERATE TO STRENUOUS EXERCISE (LIKE A BRISK WALK)?: 0 DAYS

## 2023-10-01 SDOH — ECONOMIC STABILITY: FOOD INSECURITY: WITHIN THE PAST 12 MONTHS, YOU WORRIED THAT YOUR FOOD WOULD RUN OUT BEFORE YOU GOT MONEY TO BUY MORE.: NEVER TRUE

## 2023-10-01 SDOH — ECONOMIC STABILITY: HOUSING INSECURITY: IN THE LAST 12 MONTHS, HOW MANY PLACES HAVE YOU LIVED?: 1

## 2023-10-01 SDOH — ECONOMIC STABILITY: FOOD INSECURITY: WITHIN THE PAST 12 MONTHS, THE FOOD YOU BOUGHT JUST DIDN'T LAST AND YOU DIDN'T HAVE MONEY TO GET MORE.: NEVER TRUE

## 2023-10-01 SDOH — HEALTH STABILITY: PHYSICAL HEALTH: ON AVERAGE, HOW MANY MINUTES DO YOU ENGAGE IN EXERCISE AT THIS LEVEL?: 0 MIN

## 2023-10-01 SDOH — ECONOMIC STABILITY: INCOME INSECURITY: IN THE LAST 12 MONTHS, WAS THERE A TIME WHEN YOU WERE NOT ABLE TO PAY THE MORTGAGE OR RENT ON TIME?: NO

## 2023-10-01 SDOH — ECONOMIC STABILITY: INCOME INSECURITY: HOW HARD IS IT FOR YOU TO PAY FOR THE VERY BASICS LIKE FOOD, HOUSING, MEDICAL CARE, AND HEATING?: NOT HARD AT ALL

## 2023-10-01 ASSESSMENT — SOCIAL DETERMINANTS OF HEALTH (SDOH)
HOW OFTEN DO YOU ATTEND CHURCH OR RELIGIOUS SERVICES?: NEVER
HOW OFTEN DO YOU ATTENT MEETINGS OF THE CLUB OR ORGANIZATION YOU BELONG TO?: NEVER
HOW OFTEN DO YOU HAVE A DRINK CONTAINING ALCOHOL: NEVER
DO YOU BELONG TO ANY CLUBS OR ORGANIZATIONS SUCH AS CHURCH GROUPS UNIONS, FRATERNAL OR ATHLETIC GROUPS, OR SCHOOL GROUPS?: NO
HOW OFTEN DO YOU GET TOGETHER WITH FRIENDS OR RELATIVES?: TWICE A WEEK
HOW OFTEN DO YOU ATTENT MEETINGS OF THE CLUB OR ORGANIZATION YOU BELONG TO?: NEVER
HOW MANY DRINKS CONTAINING ALCOHOL DO YOU HAVE ON A TYPICAL DAY WHEN YOU ARE DRINKING: PATIENT DOES NOT DRINK
HOW OFTEN DO YOU HAVE SIX OR MORE DRINKS ON ONE OCCASION: NEVER
HOW OFTEN DO YOU GET TOGETHER WITH FRIENDS OR RELATIVES?: TWICE A WEEK
HOW OFTEN DO YOU ATTEND CHURCH OR RELIGIOUS SERVICES?: NEVER
IN A TYPICAL WEEK, HOW MANY TIMES DO YOU TALK ON THE PHONE WITH FAMILY, FRIENDS, OR NEIGHBORS?: TWICE A WEEK
IN A TYPICAL WEEK, HOW MANY TIMES DO YOU TALK ON THE PHONE WITH FAMILY, FRIENDS, OR NEIGHBORS?: TWICE A WEEK
DO YOU BELONG TO ANY CLUBS OR ORGANIZATIONS SUCH AS CHURCH GROUPS UNIONS, FRATERNAL OR ATHLETIC GROUPS, OR SCHOOL GROUPS?: NO
WITHIN THE PAST 12 MONTHS, YOU WORRIED THAT YOUR FOOD WOULD RUN OUT BEFORE YOU GOT THE MONEY TO BUY MORE: NEVER TRUE
HOW HARD IS IT FOR YOU TO PAY FOR THE VERY BASICS LIKE FOOD, HOUSING, MEDICAL CARE, AND HEATING?: NOT HARD AT ALL

## 2023-10-01 ASSESSMENT — LIFESTYLE VARIABLES
HOW OFTEN DO YOU HAVE SIX OR MORE DRINKS ON ONE OCCASION: NEVER
AUDIT-C TOTAL SCORE: 0
HOW MANY STANDARD DRINKS CONTAINING ALCOHOL DO YOU HAVE ON A TYPICAL DAY: PATIENT DOES NOT DRINK
HOW OFTEN DO YOU HAVE A DRINK CONTAINING ALCOHOL: NEVER
SKIP TO QUESTIONS 9-10: 1

## 2023-10-03 ENCOUNTER — OFFICE VISIT (OUTPATIENT)
Dept: MEDICAL GROUP | Facility: PHYSICIAN GROUP | Age: 73
End: 2023-10-03
Payer: MEDICARE

## 2023-10-03 VITALS
SYSTOLIC BLOOD PRESSURE: 130 MMHG | OXYGEN SATURATION: 97 % | HEART RATE: 82 BPM | WEIGHT: 172 LBS | HEIGHT: 64 IN | BODY MASS INDEX: 29.37 KG/M2 | TEMPERATURE: 97.4 F | RESPIRATION RATE: 16 BRPM | DIASTOLIC BLOOD PRESSURE: 68 MMHG

## 2023-10-03 DIAGNOSIS — Z00.00 MEDICARE ANNUAL WELLNESS VISIT, SUBSEQUENT: ICD-10-CM

## 2023-10-03 DIAGNOSIS — N95.1 MENOPAUSAL STATE: ICD-10-CM

## 2023-10-03 DIAGNOSIS — M85.89 OSTEOPENIA OF MULTIPLE SITES: ICD-10-CM

## 2023-10-03 DIAGNOSIS — Z12.31 ENCOUNTER FOR SCREENING MAMMOGRAM FOR BREAST CANCER: ICD-10-CM

## 2023-10-03 DIAGNOSIS — B35.1 OM (ONYCHOMYCOSIS): ICD-10-CM

## 2023-10-03 DIAGNOSIS — E78.2 MIXED HYPERLIPIDEMIA: ICD-10-CM

## 2023-10-03 DIAGNOSIS — Z00.00 ROUTINE HEALTH MAINTENANCE: ICD-10-CM

## 2023-10-03 DIAGNOSIS — E55.9 VITAMIN D INSUFFICIENCY: ICD-10-CM

## 2023-10-03 DIAGNOSIS — E66.3 OVERWEIGHT (BMI 25.0-29.9): ICD-10-CM

## 2023-10-03 DIAGNOSIS — Z78.0 POSTMENOPAUSAL STATUS (AGE-RELATED) (NATURAL): ICD-10-CM

## 2023-10-03 DIAGNOSIS — K21.9 GASTROESOPHAGEAL REFLUX DISEASE WITHOUT ESOPHAGITIS: ICD-10-CM

## 2023-10-03 PROBLEM — M19.011 PRIMARY OSTEOARTHRITIS OF RIGHT SHOULDER: Status: RESOLVED | Noted: 2021-03-03 | Resolved: 2023-10-03

## 2023-10-03 PROBLEM — M19.011 PRIMARY OSTEOARTHRITIS OF BOTH SHOULDERS: Status: ACTIVE | Noted: 2022-12-19

## 2023-10-03 PROCEDURE — 3075F SYST BP GE 130 - 139MM HG: CPT | Performed by: NURSE PRACTITIONER

## 2023-10-03 PROCEDURE — G0439 PPPS, SUBSEQ VISIT: HCPCS | Performed by: NURSE PRACTITIONER

## 2023-10-03 PROCEDURE — 3078F DIAST BP <80 MM HG: CPT | Performed by: NURSE PRACTITIONER

## 2023-10-03 RX ORDER — TERBINAFINE HYDROCHLORIDE 250 MG/1
250 TABLET ORAL DAILY
Qty: 90 TABLET | Refills: 0 | Status: SHIPPED | OUTPATIENT
Start: 2023-10-03 | End: 2024-01-01

## 2023-10-03 ASSESSMENT — PATIENT HEALTH QUESTIONNAIRE - PHQ9: CLINICAL INTERPRETATION OF PHQ2 SCORE: 0

## 2023-10-03 ASSESSMENT — FIBROSIS 4 INDEX: FIB4 SCORE: 1.15

## 2023-10-03 ASSESSMENT — ACTIVITIES OF DAILY LIVING (ADL): BATHING_REQUIRES_ASSISTANCE: 0

## 2023-10-03 ASSESSMENT — ENCOUNTER SYMPTOMS: GENERAL WELL-BEING: GOOD

## 2023-10-03 NOTE — ASSESSMENT & PLAN NOTE
Chronic, ongoing. Continues atorvastatin 20 mg/day, denies side effects of the medication. Also continues omega 3 fatty acids.

## 2023-10-03 NOTE — ASSESSMENT & PLAN NOTE
Chronic, ongoing.  Continues vitamin D and calcium supplement daily.  Due for annual labs in September 2024.

## 2023-10-03 NOTE — ASSESSMENT & PLAN NOTE
Chronic, ongoing. Continues famotidine 20 mg/BID. Reports that she can tolerate chocolate and soda, but has to limit it or it was cause reflux. Denies cough, hoarseness, globus sensation, shortness of breath, sore throat, early satiety, unintentional weight loss, choking, dysphagia, persistent burning pain in chest or upper abdomen, nausea, vomiting, melena.

## 2023-10-03 NOTE — PROGRESS NOTES
Chief Complaint   Patient presents with    Annual Wellness Visit     HPI:  Shannan Dove is a 72 y.o. here for Medicare Annual Wellness Visit     Gastroesophageal reflux disease without esophagitis  Chronic, ongoing. Continues famotidine 20 mg/BID. Reports that she can tolerate chocolate and soda, but has to limit it or it was cause reflux. Denies cough, hoarseness, globus sensation, shortness of breath, sore throat, early satiety, unintentional weight loss, choking, dysphagia, persistent burning pain in chest or upper abdomen, nausea, vomiting, melena.     Mixed hyperlipidemia  Chronic, ongoing. Continues atorvastatin 20 mg/day, denies side effects of the medication. Also continues omega 3 fatty acids.    OM (onychomycosis)  Chronic, ongoing.  Up-to-date on blood work.  Requesting refill for terbinafine to be sent to pharmacy for ongoing fungal infection of left great toe.    Osteopenia of multiple sites  Chronic, ongoing. Continues vitamin D and calcium. Last DEXA 10/2021.    Vitamin D insufficiency  Chronic, ongoing.  Continues vitamin D and calcium supplement daily.  Due for annual labs in September 2024.     Patient Active Problem List    Diagnosis Date Noted    Primary osteoarthritis of both shoulders 12/19/2022    Overweight (BMI 25.0-29.9) 08/17/2022    Urinary frequency 07/26/2022    Bilateral primary osteoarthritis of knee 01/31/2022    Paronychia of great toe, right 08/17/2021    Lymphedema of both lower extremities 03/03/2021    Varicose vein of leg 08/10/2018    Gastroesophageal reflux disease without esophagitis 12/22/2017    Thyroid mass 06/23/2017    Vitamin D insufficiency 06/22/2017    OM (onychomycosis) 06/22/2017    AK (actinic keratosis) 06/23/2016    Chronic pain of right knee 06/23/2016    Mixed hyperlipidemia 06/23/2016    Right-sided thoracic back pain 06/23/2016    Heart murmur     Osteopenia of multiple sites      Current Outpatient Medications   Medication Sig Dispense Refill     terbinafine (LAMISIL) 250 MG Tab Take 1 Tablet by mouth every day for 90 days. 90 Tablet 0    atorvastatin (LIPITOR) 20 MG Tab Take 1 Tablet by mouth every evening. 100 Tablet 3    triamcinolone acetonide (KENALOG) 0.1 % Cream Apply 1 Application to affected area(s) 2 times a day as needed. 1 Tube 11    Omega-3 Fatty Acids (OMEGA 3 500 PO) Take 1,000 mg by mouth.      famotidine (PEPCID) 20 MG Tab Take 1 Tab by mouth 2 times a day. 60 Tab 0    Calcium Carbonate 600 MG Tab Take 1,200 mg by mouth every day.      Cholecalciferol (VITAMIN D3) 5000 units Tab Take 5,000 Units by mouth every day.       No current facility-administered medications for this visit.          Current supplements as per medication list.     Allergies: Pcn [penicillins]    Current social contact/activities: soccer games, visits with neighbors     She  reports that she has never smoked. She has never used smokeless tobacco. She reports that she does not drink alcohol and does not use drugs.  Counseling given: Yes    ROS:    Gait: Uses no assistive device  Ostomy: No  Other tubes: No  Amputations: No  Chronic oxygen use: No  Last eye exam: 2023  Wears hearing aids: No   : Denies any urinary leakage during the last 6 months    Screening:    Depression Screening  Little interest or pleasure in doing things?  0 - not at all  Feeling down, depressed , or hopeless? 0 - not at all  Patient Health Questionnaire Score: 0     If depressive symptoms identified deferred to follow up visit unless specifically addressed in assessment and plan.    Interpretation of PHQ-9 Total Score   Score Severity   1-4 No Depression   5-9 Mild Depression   10-14 Moderate Depression   15-19 Moderately Severe Depression   20-27 Severe Depression    Screening for Cognitive Impairment  Do you or any of your friends or family members have any concern about your memory? No  Three Minute Recall (Banana, Sunrise, Chair) 3/3    Oj clock face with all 12 numbers and set the hands  to show 20 past 8.  Yes    Cognitive concerns identified deferred for follow up unless specifically addressed in assessment and plan.    Fall Risk Assessment  Has the patient had two or more falls in the last year or any fall with injury in the last year?  No    Safety Assessment  Do you always wear your seatbelt?  Yes  Any changes to home needed to function safely? No  Difficulty hearing.  No  Patient counseled about all safety risks that were identified.    Functional Assessment ADLs  Are there any barriers preventing you from cooking for yourself or meeting nutritional needs?  No.    Are there any barriers preventing you from driving safely or obtaining transportation?  No.    Are there any barriers preventing you from using a telephone or calling for help?  No    Are there any barriers preventing you from shopping?  No.    Are there any barriers preventing you from taking care of your own finances?  No    Are there any barriers preventing you from managing your medications?  No    Are there any barriers preventing you from showering, bathing or dressing yourself? No    Are there any barriers preventing you from doing housework or laundry? No  Are there any barriers preventing you from using the toilet?No  Are you currently engaging in any exercise or physical activity?  No.      Self-Assessment of Health  What is your perception of your health? Good  Do you sleep more than six hours a night? Yes  In the past 7 days, how much did pain keep you from doing your normal work? None  Do you spend quality time with family or friends (virtually or in person)? Yes  Do you usually eat a heart healthy diet that constists of a variety of fruits, vegetables, whole grains and fiber? Yes  Do you eat foods high in fat and/or Fast Food more than three times per week? No    Advance Care Planning  Do you have an Advance Directive, Living Will, Durable Power of , or POLST? Yes  Advance Directive       is on file    Health  Maintenance Summary            Overdue - COVID-19 Vaccine (2 - Pfizer series) Overdue since 3/3/2023      11/03/2022  Imm Admin: MODERNA BIVALENT BOOSTER SARS-COV-2 VACCINE (6+)    05/01/2022  Imm Admin: MODERNA SARS-COV-2 VACCINE (12+)    11/24/2021  Imm Admin: MODERNA SARS-COV-2 VACCINE (12+)    03/13/2021  Imm Admin: MODERNA SARS-COV-2 VACCINE (12+)    02/13/2021  Imm Admin: MODERNA SARS-COV-2 VACCINE (12+)              Scheduled - Bone Density Scan (Every 2 Years) Scheduled for 11/7/2023      10/05/2021  DS-BONE DENSITY STUDY (DEXA)    05/17/2019  DS-BONE DENSITY STUDY (DEXA)    07/07/2016  DS-BONE DENSITY STUDY (DEXA)    09/28/2011  DS-BONE DENSITY STUDY (DEXA)    06/17/2009  DS-BONE DENSITY STUDY (DEXA)    Only the first 5 history entries have been loaded, but more history exists.              Scheduled - Mammogram (Yearly) Scheduled for 11/7/2023      10/17/2022  MA-SCREENING MAMMO BILAT W/TOMOSYNTHESIS W/CAD    10/13/2021  MA-SCREENING MAMMO BILAT W/TOMOSYNTHESIS W/CAD    09/04/2020  MA-SCREENING MAMMO BILAT W/TOMOSYNTHESIS W/CAD    08/16/2019  MA-SCREENING MAMMO BILAT W/TOMOSYNTHESIS W/CAD    08/14/2018  MA-MAMMO SCREENING BILAT W/HAM W/CAD    Only the first 5 history entries have been loaded, but more history exists.              Annual Wellness Visit (Every 366 Days) Next due on 10/3/2024      10/03/2023  Visit Dx: Medicare annual wellness visit, subsequent    10/03/2023  Subsequent Annual Wellness Visit - Includes PPPS ()    10/13/2021  Visit Dx: Medicare annual wellness visit, subsequent    10/05/2021  Visit Dx: Medicare annual wellness visit, subsequent    07/17/2021  Visit Dx: Medicare annual wellness visit, subsequent    Only the first 5 history entries have been loaded, but more history exists.              Colorectal Cancer Screening (Colonoscopy - Every 5 Years) Next due on 10/24/2026      10/24/2021  REFERRAL TO GI FOR COLONOSCOPY    10/12/2021  REFERRAL TO GI FOR COLONOSCOPY    08/03/2016   REFERRAL TO GI FOR COLONOSCOPY              IMM DTaP/Tdap/Td Vaccine (3 - Td or Tdap) Next due on 2020  Imm Admin: Tdap Vaccine    2010  Imm Admin: Tdap Vaccine              Hepatitis A Vaccine (Hep A) (Series Information) Aged Out      2009  Imm Admin: Hepatitis A Vaccine, Adult    07/15/2008  Imm Admin: Hepatitis A Vaccine, Adult              Hepatitis B Vaccine (Hep B) (Series Information) Completed      2009  Imm Admin: Hepatitis B Vaccine (Adol/Adult)    2008  Imm Admin: Hepatitis B Vaccine (Adol/Adult)    07/15/2008  Imm Admin: Hepatitis B Vaccine (Adol/Adult)              Pneumococcal Vaccine: 65+ Years (Series Information) Completed      08/10/2018  Imm Admin: Pneumococcal polysaccharide vaccine (PPSV-23)    2017  Imm Admin: Pneumococcal Conjugate Vaccine (Prevnar/PCV-13)              Hepatitis C Screening  Completed      2020  Hepatitis C Antibody component of HCV Scrn ( 4010-3338 1xLife)              Zoster (Shingles) Vaccines (Series Information) Completed      2020  Imm Admin: Zoster Vaccine Recombinant (RZV) (SHINGRIX)    2020  Imm Admin: Zoster Vaccine Recombinant (RZV) (SHINGRIX)    10/21/2011  Imm Admin: Zoster Vaccine Live (ZVL) (Zostavax) - HISTORICAL DATA              Influenza Vaccine (Series Information) Completed      09/15/2023  Imm Admin: Influenza Vaccine Adult HD    10/06/2022  Imm Admin: Influenza Vaccine Adult HD    10/05/2021  Imm Admin: Influenza Vaccine Quad Inj (Pf)    10/13/2020  Imm Admin: Influenza Vaccine Adult HD    2019  Imm Admin: Influenza, Unspecified - HISTORICAL DATA    Only the first 5 history entries have been loaded, but more history exists.              HPV Vaccines (Series Information) Aged Out      No completion history exists for this topic.              Polio Vaccine (Inactivated Polio) (Series Information) Aged Out      No completion history exists for this topic.               Meningococcal Immunization (Series Information) Aged Out      No completion history exists for this topic.              Discontinued - Cervical Cancer Screening  Discontinued        Frequency changed to Never automatically (Topic No Longer Applies)    09/23/2016  THINPREP PAP WITH HPV    09/23/2016  PATHOLOGY GYN SPECIMEN                  Patient Care Team:  ELLEN Gomez as PCP - General (Family Medicine)  ELLEN Gomez as PCP - [facility] Paneled  Becky Mcconnell [facility] as Medical Home Care Manager (Dentistry)  Markie Dumont OD as Medical Home Care Manager (Optometry)  Ruben VALLADARES D.O. as Consulting Physician (Diagnostic Radiology)  Michele Ridley, PT as Physical Therapist (Physical Therapy)  ELLEN Dexter (Obstetrics & Gynecology)  [employer] (Durable Medical Equipment & Medical Supplies)  Rao Reece M.D. (Gastroenterology)  Alethea Hudson M.D. (Dermatology)    Social History     Tobacco Use    Smoking status: Never    Smokeless tobacco: Never   Vaping Use    Vaping Use: Never used   Substance Use Topics    Alcohol use: No    Drug use: No     Family History   Problem Relation Age of Onset    Heart Disease Mother         fast heart beat    Cancer Mother         colon    Psychiatric Illness Father         suicide    Autism Brother         asperger's ?    Cancer Brother         colon,liver,lung    Stroke Brother     Alcohol/Drug Brother         marijuana    Drug abuse Brother         cocaine,marijuana    Alcohol abuse Brother     Hypertension Brother         says it's mild    Cancer Brother         prostate    Hypertension Maternal Aunt     Heart Disease Maternal Aunt         fast heart beat,had ablation    Heart Disease Maternal Aunt         fast heart beat ?    Alcohol/Drug Maternal Uncle     Asthma Maternal Grandmother     Heart Disease Maternal Grandmother         weak heart ?    Alcohol/Drug Maternal Grandfather     Heart Disease  "Maternal Grandfather         heart attack    Heart Disease Paternal Grandmother         heart problems ?    No Known Problems Paternal Grandfather      She  has a past medical history of Arthritis, Heart murmur, Hyperglycemia (12/27/2018), Hyperlipidemia, Leucocytosis (12/27/2018), Post menopausal problems, Varicose veins of both legs with edema, and Vitamin D deficiency.   Past Surgical History:   Procedure Laterality Date    GYN SURGERY  10/25/2022    Endometrial biopsy    TONSILLECTOMY       Exam:   /68 (BP Location: Left arm, Patient Position: Sitting, BP Cuff Size: Large adult)   Pulse 82   Temp 36.3 °C (97.4 °F) (Temporal)   Resp 16   Ht 1.626 m (5' 4\")   Wt 78 kg (172 lb)   SpO2 97%  Body mass index is 29.52 kg/m².    Hearing excellent.    Dentition good  Alert, oriented in no acute distress.  Eye contact is good, speech goal directed, affect calm    Assessment and Plan. The following treatment and monitoring plan is recommended:    1. Medicare annual wellness visit, subsequent  - Subsequent Annual Wellness Visit - Includes PPPS ()    2. Mixed hyperlipidemia  Chronic, ongoing. Continue atorvastatin 20 mg nightly, does not need a refill at this time. Due for updated annual labs in September 2024 prior to annual follow up.  - Comp Metabolic Panel; Future  - Lipid Profile; Future  - TSH WITH REFLEX TO FT4; Future    3. Overweight (BMI 25.0-29.9)  Encourage diet high in fruits, vegetables, and fiber. And a diet low in salt, refined carbohydrates, cholesterol, saturated fat, and trans fatty acids.    Encourage a minimum of 30 minutes of moderate intensity aerobic exercise (eg, brisk walking) is recommended on five days each week. Or 30 minutes of vigorous-intensity aerobic exercise (eg, jogging) on three days each week.   Patient's body mass index is 29.52 kg/m². Exercise and nutrition counseling were performed at this visit.  Due for updated annual labs in September 2024 prior to annual follow " up.  - CBC WITH DIFFERENTIAL; Future  - Comp Metabolic Panel; Future  - Lipid Profile; Future  - TSH WITH REFLEX TO FT4; Future    4. Gastroesophageal reflux disease without esophagitis  Chronic, ongoing. Continue famotidine 20 mg twice daily. Continue to avoid triggers. Due for updated annual labs in September 2024 prior to annual follow up.  - CBC WITH DIFFERENTIAL; Future  - Comp Metabolic Panel; Future  - TSH WITH REFLEX TO FT4; Future  - VITAMIN D,25 HYDROXY (DEFICIENCY); Future    5. Vitamin D insufficiency  Chronic, ongoing. Continue vitamin d and calcium supplement daily. Due for updated annual labs in September 2024 prior to annual follow up.  - VITAMIN D,25 HYDROXY (DEFICIENCY); Future    6. OM (onychomycosis)  Chronic, ongoing. Restart terbinafine 250 mg once daily for 90 days, labs up to date. Due for updated annual labs in September 2024 prior to annual follow up.  - terbinafine (LAMISIL) 250 MG Tab; Take 1 Tablet by mouth every day for 90 days.  Dispense: 90 Tablet; Refill: 0  - Comp Metabolic Panel; Future    7. Osteopenia of multiple sites  8. Postmenopausal status (age-related) (natural)  9. Menopausal state  Chronic, ongoing. Encourage patient to continue to take vitamin d and calcium supplement daily. Encourage a minimum of 150 minutes of weight bearing exercises weekly. Due for updated DEXA. Due for updated annual labs in September 2024 prior to annual follow up.   - DS-BONE DENSITY STUDY (DEXA); Future  - TSH WITH REFLEX TO FT4; Future  - VITAMIN D,25 HYDROXY (DEFICIENCY); Future    10. Encounter for screening mammogram for breast cancer  Due for screening.  - MA-SCREENING MAMMO BILAT W/CAD; Future    11. Routine health maintenance  Due for updated annual labs in September 2024 prior to annual follow up.  - CBC WITH DIFFERENTIAL; Future  - Comp Metabolic Panel; Future  - Lipid Profile; Future  - TSH WITH REFLEX TO FT4; Future  - VITAMIN D,25 HYDROXY (DEFICIENCY); Future     Services suggested:  No services needed at this time  Health Care Screening: Age-appropriate preventive services recommended by USPTF and ACIP covered by Medicare were discussed today. Services ordered if indicated and agreed upon by the patient.  Referrals offered: Community-based lifestyle interventions to reduce health risks and promote self-management and wellness, fall prevention, nutrition, physical activity, tobacco-use cessation, weight loss, and mental health services as per orders if indicated.    Discussion today about general wellness and lifestyle habits:    Prevent falls and reduce trip hazards; Cautioned about securing or removing rugs.  Have a working fire alarm and carbon monoxide detector;   Engage in regular physical activity and social activities     Follow-up: Return in about 1 year (around 10/3/2024) for AWV, Follow up Labs.    Please note that this dictation was created using voice recognition software. I have worked with consultants from the vendor as well as technical experts from [employer] [employer] to optimize the interface. I have made every reasonable attempt to correct obvious errors, but I expect that there are errors of grammar and possibly content that I did not discover before finalizing the note.

## 2023-10-31 DIAGNOSIS — E78.2 MIXED HYPERLIPIDEMIA: ICD-10-CM

## 2023-10-31 RX ORDER — ATORVASTATIN CALCIUM 20 MG/1
20 TABLET, FILM COATED ORAL EVERY EVENING
Qty: 100 TABLET | Refills: 3 | Status: SHIPPED | OUTPATIENT
Start: 2023-10-31

## 2023-10-31 NOTE — TELEPHONE ENCOUNTER
Received request via: Pharmacy    Was the patient seen in the last year in this department? Yes    Does the patient have an active prescription (recently filled or refills available) for medication(s) requested? No    Does the patient have correction Plus and need 100 day supply (blood pressure, diabetes and cholesterol meds only)? Yes, quantity updated to 100 days

## 2023-10-31 NOTE — TELEPHONE ENCOUNTER
Requested Prescriptions     Pending Prescriptions Disp Refills   • atorvastatin (LIPITOR) 20 MG Tab [Pharmacy Med Name: ATORVASTATIN 20 MG TABLET] 100 Tablet 3     Sig: TAKE 1 TABLET BY MOUTH EVERY DAY IN THE EVENING       MAYCOL Gomez.

## 2023-11-07 ENCOUNTER — HOSPITAL ENCOUNTER (OUTPATIENT)
Dept: RADIOLOGY | Facility: MEDICAL CENTER | Age: 73
End: 2023-11-07
Attending: NURSE PRACTITIONER
Payer: MEDICARE

## 2023-11-07 DIAGNOSIS — Z12.31 ENCOUNTER FOR SCREENING MAMMOGRAM FOR BREAST CANCER: ICD-10-CM

## 2023-11-07 DIAGNOSIS — Z78.0 POSTMENOPAUSAL STATUS (AGE-RELATED) (NATURAL): ICD-10-CM

## 2023-11-07 DIAGNOSIS — N95.1 MENOPAUSAL STATE: ICD-10-CM

## 2023-11-07 DIAGNOSIS — M85.89 OSTEOPENIA OF MULTIPLE SITES: ICD-10-CM

## 2023-11-07 PROCEDURE — 77080 DXA BONE DENSITY AXIAL: CPT

## 2023-11-07 PROCEDURE — 77063 BREAST TOMOSYNTHESIS BI: CPT

## 2023-11-16 ENCOUNTER — APPOINTMENT (RX ONLY)
Dept: URBAN - METROPOLITAN AREA CLINIC 22 | Facility: CLINIC | Age: 73
Setting detail: DERMATOLOGY
End: 2023-11-16

## 2023-11-16 DIAGNOSIS — L57.0 ACTINIC KERATOSIS: ICD-10-CM

## 2023-11-16 DIAGNOSIS — Z71.89 OTHER SPECIFIED COUNSELING: ICD-10-CM

## 2023-11-16 DIAGNOSIS — L81.4 OTHER MELANIN HYPERPIGMENTATION: ICD-10-CM

## 2023-11-16 DIAGNOSIS — L72.0 EPIDERMAL CYST: ICD-10-CM

## 2023-11-16 DIAGNOSIS — L01.01 NON-BULLOUS IMPETIGO: ICD-10-CM

## 2023-11-16 DIAGNOSIS — L82.1 OTHER SEBORRHEIC KERATOSIS: ICD-10-CM

## 2023-11-16 DIAGNOSIS — D22 MELANOCYTIC NEVI: ICD-10-CM

## 2023-11-16 DIAGNOSIS — L82.0 INFLAMED SEBORRHEIC KERATOSIS: ICD-10-CM

## 2023-11-16 PROBLEM — D22.5 MELANOCYTIC NEVI OF TRUNK: Status: ACTIVE | Noted: 2023-11-16

## 2023-11-16 PROBLEM — D48.5 NEOPLASM OF UNCERTAIN BEHAVIOR OF SKIN: Status: ACTIVE | Noted: 2023-11-16

## 2023-11-16 PROCEDURE — ? COUNSELING

## 2023-11-16 PROCEDURE — 99213 OFFICE O/P EST LOW 20 MIN: CPT | Mod: 25

## 2023-11-16 PROCEDURE — 11102 TANGNTL BX SKIN SINGLE LES: CPT | Mod: 59

## 2023-11-16 PROCEDURE — ? LIQUID NITROGEN

## 2023-11-16 PROCEDURE — 17003 DESTRUCT PREMALG LES 2-14: CPT | Mod: 59

## 2023-11-16 PROCEDURE — ? PRESCRIPTION

## 2023-11-16 PROCEDURE — ? SUNSCREEN TREATMENT REGIMEN

## 2023-11-16 PROCEDURE — ? BIOPSY BY SHAVE METHOD

## 2023-11-16 PROCEDURE — ? EXTRACTIONS

## 2023-11-16 PROCEDURE — 17110 DESTRUCTION B9 LES UP TO 14: CPT

## 2023-11-16 PROCEDURE — 17000 DESTRUCT PREMALG LESION: CPT | Mod: 59

## 2023-11-16 RX ORDER — MUPIROCIN 20 MG/G
OINTMENT TOPICAL BID
Qty: 22 | Refills: 0 | Status: ERX | COMMUNITY
Start: 2023-11-16

## 2023-11-16 RX ADMIN — MUPIROCIN 1: 20 OINTMENT TOPICAL at 00:00

## 2023-11-16 ASSESSMENT — LOCATION ZONE DERM
LOCATION ZONE: ARM
LOCATION ZONE: FACE
LOCATION ZONE: TRUNK
LOCATION ZONE: NECK
LOCATION ZONE: NOSE

## 2023-11-16 ASSESSMENT — LOCATION DETAILED DESCRIPTION DERM
LOCATION DETAILED: LEFT MEDIAL TRAPEZIAL NECK
LOCATION DETAILED: LEFT VENTRAL PROXIMAL FOREARM
LOCATION DETAILED: RIGHT CENTRAL TEMPLE
LOCATION DETAILED: LEFT SUPERIOR CENTRAL MALAR CHEEK
LOCATION DETAILED: SUPERIOR THORACIC SPINE
LOCATION DETAILED: INFERIOR MID FOREHEAD
LOCATION DETAILED: NASAL TIP
LOCATION DETAILED: RIGHT VENTRAL PROXIMAL FOREARM
LOCATION DETAILED: INFERIOR THORACIC SPINE
LOCATION DETAILED: RIGHT NASAL SIDEWALL
LOCATION DETAILED: LEFT MEDIAL MALAR CHEEK

## 2023-11-16 ASSESSMENT — LOCATION SIMPLE DESCRIPTION DERM
LOCATION SIMPLE: UPPER BACK
LOCATION SIMPLE: INFERIOR FOREHEAD
LOCATION SIMPLE: LEFT FOREARM
LOCATION SIMPLE: RIGHT FOREARM
LOCATION SIMPLE: NOSE
LOCATION SIMPLE: POSTERIOR NECK
LOCATION SIMPLE: LEFT CHEEK
LOCATION SIMPLE: RIGHT NOSE
LOCATION SIMPLE: RIGHT TEMPLE

## 2023-11-16 NOTE — PROCEDURE: BIOPSY BY SHAVE METHOD
Detail Level: Detailed
Depth Of Biopsy: dermis
Was A Bandage Applied: Yes
Size Of Lesion In Cm: 0.7
X Size Of Lesion In Cm: 0
Biopsy Type: H and E
Biopsy Method: Personna blade
Anesthesia Type: 0.05% lidocaine without epinephrine
Anesthesia Volume In Cc: 0.5
Hemostasis: Aluminum Chloride and Electrocautery
Wound Care: Petrolatum
Dressing: pressure dressing with telfa
Destruction After The Procedure: No
Type Of Destruction Used: Curettage
Curettage Text: The wound bed was treated with curettage after the biopsy was performed.
Cryotherapy Text: The wound bed was treated with cryotherapy after the biopsy was performed.
Electrodesiccation Text: The wound bed was treated with electrodesiccation after the biopsy was performed.
Electrodesiccation And Curettage Text: The wound bed was treated with electrodesiccation and curettage after the biopsy was performed.
Silver Nitrate Text: The wound bed was treated with silver nitrate after the biopsy was performed.
Lab: [id number]
Lab Facility: 
Consent: Written consent was obtained and risks were reviewed including but not limited to scarring, infection, bleeding, scabbing, incomplete removal, nerve damage and allergy to anesthesia.
Post-Care Instructions: I reviewed with the patient in detail post-care instructions. Patient is to keep the biopsy site dry overnight. Gentle cleansing daily.  Apply petroleum ointment daily until healed. Patient may apply hydrogen peroxide soaks to remove any crusting.
Notification Instructions: Patient will be notified of biopsy results. However, patient instructed to call the office if not contacted within 2 weeks.
Billing Type: Third-Party Bill
Information: Selecting Yes will display possible errors in your note based on the variables you have selected. This validation is only offered as a suggestion for you. PLEASE NOTE THAT THE VALIDATION TEXT WILL BE REMOVED WHEN YOU FINALIZE YOUR NOTE. IF YOU WANT TO FAX A PRELIMINARY NOTE YOU WILL NEED TO TOGGLE THIS TO 'NO' IF YOU DO NOT WANT IT IN YOUR FAXED NOTE.

## 2023-11-16 NOTE — PROCEDURE: LIQUID NITROGEN
Consent: The patient's consent was obtained including but not limited to risks of crusting, scabbing, blistering, scarring, darker or lighter pigmentary change, recurrence, incomplete removal and infection.
Show Aperture Variable?: Yes
Render Note In Bullet Format When Appropriate: No
Number Of Freeze-Thaw Cycles: 2 freeze-thaw cycles
Detail Level: Detailed
Post-Care Instructions: I reviewed with the patient in detail post-care instructions. Patient is to wear sunprotection, and avoid picking at any of the treated lesions. Pt may apply Vaseline to crusted or scabbing areas.
Duration Of Freeze Thaw-Cycle (Seconds): 0
Medical Necessity Information: It is in your best interest to select a reason for this procedure from the list below. All of these items fulfill various CMS LCD requirements except the new and changing color options.
Medical Necessity Clause: This procedure was medically necessary because the lesions that were treated were:
Spray Paint Text: The liquid nitrogen was applied to the skin utilizing a spray paint frosting technique.

## 2023-11-16 NOTE — HPI: FULL BODY SKIN EXAMINATION
How Severe Are Your Spot(S)?: moderate
What Type Of Note Output Would You Prefer (Optional)?: Standard Output
What Is The Reason For Today's Visit?: Full Body Skin Examination
What Is The Reason For Today's Visit? (Being Monitored For X): concerning skin lesions on an annual basis
English

## 2023-11-16 NOTE — PROCEDURE: EXTRACTIONS
Post-Care Instructions: I reviewed with the patient in detail post-care instructions. Patient is to keep the treatment areas dry overnight, and then apply bacitracin twice daily until healed. Patient may apply hydrogen peroxide soaks to remove any crusting.
Hemostasis: Pressure
Prep Text (Optional): Prior to removal the treatment areas were prepped in the usual fashion with alcohol cleanser
Acne Type: Comedonal Lesions
Render Number Of Lesions Treated: yes
Detail Level: Detailed
Consent was obtained and risks were reviewed including but not limited to scarring, infection, bleeding, scabbing, incomplete removal, and allergy to anesthesia.
Render Post-Care Instructions In Note?: no
Extraction Method: 11 blade and q-tip

## 2024-01-19 ENCOUNTER — APPOINTMENT (RX ONLY)
Dept: URBAN - METROPOLITAN AREA CLINIC 22 | Facility: CLINIC | Age: 74
Setting detail: DERMATOLOGY
End: 2024-01-19

## 2024-01-19 PROBLEM — C44.311 BASAL CELL CARCINOMA OF SKIN OF NOSE: Status: ACTIVE | Noted: 2024-01-19

## 2024-01-19 PROCEDURE — 14060 TIS TRNFR E/N/E/L 10 SQ CM/<: CPT

## 2024-01-19 PROCEDURE — 17311 MOHS 1 STAGE H/N/HF/G: CPT

## 2024-01-19 PROCEDURE — 17312 MOHS ADDL STAGE: CPT

## 2024-01-19 PROCEDURE — ? MOHS SURGERY

## 2024-01-19 NOTE — PROCEDURE: MOHS SURGERY
Mohs Case Number: [id number]
Previous Accession (Optional): [id number]
Biopsy Photograph Reviewed: Yes
Referring Physician (Optional): Mannering
Consent Type: Consent 1 (Standard)
Eye Shield Used: No
Surgeon Performing Repair (Optional): Rakesh Weiss M.D.
Initial Size Of Lesion: 0.2
X Size Of Lesion In Cm (Optional): 0.1
Number Of Stages: 1
Primary Defect Length In Cm (Final Defect Size - Required For Flaps/Grafts): 1.7
Repair Type: Flap
Which Eyelid Repair Cpt Are You Using?: 75340
Oculoplastic Surgeon Procedure Text (A): After obtaining clear surgical margins the patient was sent to oculoplastics for surgical repair.  The patient understands they will receive post-surgical care and follow-up from the referring physician's office.
Otolaryngologist Procedure Text (A): After obtaining clear surgical margins the patient was sent to otolaryngology for surgical repair.  The patient understands they will receive post-surgical care and follow-up from the referring physician's office.
Plastic Surgeon Procedure Text (A): After obtaining clear surgical margins the patient was sent to plastics for surgical repair.  The patient understands they will receive post-surgical care and follow-up from the referring physician's office.
Mid-Level Procedure Text (A): After obtaining clear surgical margins the patient was sent to a mid-level provider for surgical repair.  The patient understands they will receive post-surgical care and follow-up from the mid-level provider.
Provider Procedure Text (A): After obtaining clear surgical margins the defect was repaired by another provider.
Asc Procedure Text (A): After obtaining clear surgical margins the patient was sent to an ASC for surgical repair.  The patient understands they will receive post-surgical care and follow-up from the ASC physician.
Simple / Intermediate / Complex Repair - Final Wound Length In Cm: 0
Suturegard Retention Suture: 2-0 Nylon
Retention Suture Bite Size: 3 mm
Length To Time In Minutes Device Was In Place: 10
Undermining Type: Entire Wound
Debridement Text: The wound edges were debrided prior to proceeding with the closure to facilitate wound healing.
Helical Rim Text: The closure involved the helical rim.
Vermilion Border Text: The closure involved the vermilion border.
Nostril Rim Text: The closure involved the nostril rim.
Retention Suture Text: Retention sutures were placed to support the closure and prevent dehiscence.
Flap Type: Banner Transposition Flap
Secondary Defect Length In Cm (Required For Flaps): 0.9
Secondary Defect Width In Cm (Required For Flaps): 1.6
Location Indication Override (Is Already Calculated Based On Selected Body Location): Area H
Area H Indication Text: Tumors in this location are included in Area H (eyelids, eyebrows, nose, lips, chin, ear, pre-auricular, post-auricular, temple, genitalia, hands, feet, ankles and areola).  Tissue conservation is critical in these anatomic locations.
Area M Indication Text: Tumors in this location are included in Area M (cheek, forehead, scalp, neck, jawline and pretibial skin).  Mohs surgery is indicated for tumors in these anatomic locations.
Area L Indication Text: Tumors in this location are included in Area L (trunk and extremities).  Mohs surgery is indicated for larger tumors, or tumors with aggressive histologic features, in these anatomic locations.
Tumor Debulked?: curette
Depth Of Tumor Invasion (For Histology): dermis
Perineural Invasion (For Histology - Be Specific If Possible): absent
Presence Of Scar Tissue (For Histology): present
Surgical Defect Length In Cm (Optional): 1.3
Surgical Defect Width In Cm (Optional): 0.8
Special Stains Stage 1 - Results: Base On Clearance Noted Above
Stage 2: Additional Anesthesia Volume In Cc: 0.5
Stage 2: Additional Anesthesia Type: 1% lidocaine with epinephrine and a 1:10 solution of 8.4% sodium bicarbonate
Surgical Defect Width In Cm (Optional): 1.0
Stage 4: Additional Anesthesia Type: 1% lidocaine with epinephrine
Include Tumor Staging In Mohs Note?: Please Select the Appropriate Response
Staging Info: By selecting yes to the question above you will include information on AJCC 8 tumor staging in your Mohs note. Information on tumor staging will be automatically added for SCCs on the head and neck. AJCC 8 includes tumor size, tumor depth, perineural involvement and bone invasion.
Tumor Depth: Less than 6mm from granular layer and no invasion beyond the subcutaneous fat
Medical Necessity Statement: Based on my medical judgement, Mohs surgery is the most appropriate treatment for this cancer compared to other treatments.
Alternatives Discussed Intro (Do Not Add Period): I discussed alternative treatments to Mohs surgery and specifically discussed the risks and benefits of
Consent 1/Introductory Paragraph: The rationale for Mohs was explained to the patient and consent was obtained. The risks, benefits and alternatives to therapy were discussed in detail. Specifically, the risks of infection, scarring, bleeding, prolonged wound healing, incomplete removal, allergy to anesthesia, nerve injury and recurrence were addressed. Prior to the procedure, the treatment site was clearly identified and confirmed by the patient. All components of Universal Protocol/PAUSE Rule completed.
Consent 2/Introductory Paragraph: Mohs surgery was explained to the patient and consent was obtained. The risks, benefits and alternatives to therapy were discussed in detail. Specifically, the risks of infection, scarring, bleeding, prolonged wound healing, incomplete removal, allergy to anesthesia, nerve injury and recurrence were addressed. Prior to the procedure, the treatment site was clearly identified and confirmed by the patient. All components of Universal Protocol/PAUSE Rule completed.
Consent 3/Introductory Paragraph: I gave the patient a chance to ask questions they had about the procedure.  Following this I explained the Mohs procedure and consent was obtained. The risks, benefits and alternatives to therapy were discussed in detail. Specifically, the risks of infection, scarring, bleeding, prolonged wound healing, incomplete removal, allergy to anesthesia, nerve injury and recurrence were addressed. Prior to the procedure, the treatment site was clearly identified and confirmed by the patient. All components of Universal Protocol/PAUSE Rule completed.
Consent (Temporal Branch)/Introductory Paragraph: The rationale for Mohs was explained to the patient and consent was obtained. The risks, benefits and alternatives to therapy were discussed in detail. Specifically, the risks of damage to the temporal branch of the facial nerve, infection, scarring, bleeding, prolonged wound healing, incomplete removal, allergy to anesthesia, and recurrence were addressed. Prior to the procedure, the treatment site was clearly identified and confirmed by the patient. All components of Universal Protocol/PAUSE Rule completed.
Consent (Marginal Mandibular)/Introductory Paragraph: The rationale for Mohs was explained to the patient and consent was obtained. The risks, benefits and alternatives to therapy were discussed in detail. Specifically, the risks of damage to the marginal mandibular branch of the facial nerve, infection, scarring, bleeding, prolonged wound healing, incomplete removal, allergy to anesthesia, and recurrence were addressed. Prior to the procedure, the treatment site was clearly identified and confirmed by the patient. All components of Universal Protocol/PAUSE Rule completed.
Consent (Spinal Accessory)/Introductory Paragraph: The rationale for Mohs was explained to the patient and consent was obtained. The risks, benefits and alternatives to therapy were discussed in detail. Specifically, the risks of damage to the spinal accessory nerve, infection, scarring, bleeding, prolonged wound healing, incomplete removal, allergy to anesthesia, and recurrence were addressed. Prior to the procedure, the treatment site was clearly identified and confirmed by the patient. All components of Universal Protocol/PAUSE Rule completed.
Consent (Near Eyelid Margin)/Introductory Paragraph: The rationale for Mohs was explained to the patient and consent was obtained. The risks, benefits and alternatives to therapy were discussed in detail. Specifically, the risks of ectropion or eyelid deformity, infection, scarring, bleeding, prolonged wound healing, incomplete removal, allergy to anesthesia, nerve injury and recurrence were addressed. Prior to the procedure, the treatment site was clearly identified and confirmed by the patient. All components of Universal Protocol/PAUSE Rule completed.
Consent (Ear)/Introductory Paragraph: The rationale for Mohs was explained to the patient and consent was obtained. The risks, benefits and alternatives to therapy were discussed in detail. Specifically, the risks of ear deformity, infection, scarring, bleeding, prolonged wound healing, incomplete removal, allergy to anesthesia, nerve injury and recurrence were addressed. Prior to the procedure, the treatment site was clearly identified and confirmed by the patient. All components of Universal Protocol/PAUSE Rule completed.
Consent (Nose)/Introductory Paragraph: The rationale for Mohs was explained to the patient and consent was obtained. The risks, benefits and alternatives to therapy were discussed in detail. Specifically, the risks of nasal deformity, changes in the flow of air through the nose, infection, scarring, bleeding, prolonged wound healing, incomplete removal, allergy to anesthesia, nerve injury and recurrence were addressed. Prior to the procedure, the treatment site was clearly identified and confirmed by the patient. All components of Universal Protocol/PAUSE Rule completed.
Consent (Lip)/Introductory Paragraph: The rationale for Mohs was explained to the patient and consent was obtained. The risks, benefits and alternatives to therapy were discussed in detail. Specifically, the risks of lip deformity, changes in the oral aperture, infection, scarring, bleeding, prolonged wound healing, incomplete removal, allergy to anesthesia, nerve injury and recurrence were addressed. Prior to the procedure, the treatment site was clearly identified and confirmed by the patient. All components of Universal Protocol/PAUSE Rule completed.
Consent (Scalp)/Introductory Paragraph: The rationale for Mohs was explained to the patient and consent was obtained. The risks, benefits and alternatives to therapy were discussed in detail. Specifically, the risks of changes in hair growth pattern secondary to repair, infection, scarring, bleeding, prolonged wound healing, incomplete removal, allergy to anesthesia, nerve injury and recurrence were addressed. Prior to the procedure, the treatment site was clearly identified and confirmed by the patient. All components of Universal Protocol/PAUSE Rule completed.
Detail Level: Detailed
Postop Diagnosis: same
Anesthesia Volume In Cc: 7.5
Additional Anesthesia Volume In Cc: 6
Hemostasis: Electrodesiccation
Estimated Blood Loss (Cc): minimal
Repair Anesthesia Method: local infiltration
Brow Lift Text: A midfrontal incision was made medially to the defect to allow access to the tissues just superior to the left eyebrow. Following careful dissection inferiorly in a supraperiosteal plane to the level of the left eyebrow, several 3-0 monocryl sutures were used to resuspend the eyebrow orbicularis oculi muscular unit to the superior frontal bone periosteum. This resulted in an appropriate reapproximation of static eyebrow symmetry and correction of the left brow ptosis.
Deep Sutures: 5-0 Maxon
Epidermal Sutures: 6-0 Surgipro
Epidermal Closure: running and interrupted
Suturegard Intro: Intraoperative tissue expansion was performed, utilizing the SUTUREGARD device, in order to reduce wound tension.
Suturegard Body: The suture ends were repeatedly re-tightened and re-clamped to achieve the desired tissue expansion.
Hemigard Intro: Due to skin fragility and wound tension, it was decided to use HEMIGARD adhesive retention suture devices to permit a linear closure. The skin was cleaned and dried for a 6cm distance away from the wound. Excessive hair, if present, was removed to allow for adhesion.
Hemigard Postcare Instructions: The HEMIGARD strips are to remain completely dry for at least 5-7 days.
Donor Site Anesthesia Type: same as repair anesthesia
Epidermal Closure Graft Donor Site (Optional): simple interrupted
Graft Donor Site Bandage (Optional-Leave Blank If You Don't Want In Note): Steri-strips and a pressure bandage were applied to the donor site.
Closure 2 Information: This tab is for additional flaps and grafts, including complex repair and grafts and complex repair and flaps. You can also specify a different location for the additional defect, if the location is the same you do not need to select a new one. We will insert the automated text for the repair you select below just as we do for solitary flaps and grafts. Please note that at this time if you select a location with a different insurance zone you will need to override the ICD10 and CPT if appropriate.
Closure 3 Information: This tab is for additional flaps and grafts above and beyond our usual structured repairs.  Please note if you enter information here it will not currently bill and you will need to add the billing information manually.
Wound Care: Petrolatum
Dressing: pressure dressing with telfa
Dressing (No Sutures): dry sterile dressing
Suture Removal: 7 days
Unna Boot Text: An Unna boot was placed to help immobilize the limb and facilitate more rapid healing.
Home Suture Removal Text: Patient was provided instructions on removing sutures and will remove their sutures at home.  If they have any questions or difficulties they will call the office.
Post-Care Instructions: I reviewed with the patient in detail post-care instructions. Patient is not to engage in any heavy lifting, exercise, or swimming for the next 14 days. Should the patient develop any fevers, chills, bleeding, severe pain patient will contact the office immediately.
Pain Refusal Text: I offered to prescribe pain medication but the patient refused to take this medication.
Mauc Instructions: By selecting yes to the question below the MAUC number will be added into the note.  This will be calculated automatically based on the diagnosis chosen, the size entered, the body zone selected (H,M,L) and the specific indications you chose. You will also have the option to override the Mohs AUC if you disagree with the automatically calculated number and this option is found in the Case Summary tab.
Where Do You Want The Question To Include Opioid Counseling Located?: Case Summary Tab
Eye Protection Verbiage: Before proceeding with the stage, a plastic scleral shield was inserted. The globe was anesthetized with a few drops of 1% lidocaine with 1:100,000 epinephrine. Then, an appropriate sized scleral shield was chosen and coated with lacrilube ointment. The shield was gently inserted and left in place for the duration of each stage. After the stage was completed, the shield was gently removed.
Mohs Method Verbiage: An incision at a 45 degree angle following the standard Mohs approach was done and the specimen was harvested as a microscopic controlled layer.
Surgeon/Pathologist Verbiage (Will Incorporate Name Of Surgeon From Intro If Not Blank): operated in two distinct and integrated capacities as the surgeon and pathologist.
Mohs Histo Method Verbiage: Each section was then chromacoded and processed in the Mohs lab using the Mohs protocol and submitted for frozen section.
Subsequent Stages Histo Method Verbiage: Using a similar technique to that described above, a thin layer of tissue was removed from all areas where tumor was visible on the previous stage.  The tissue was again oriented, mapped, dyed, and processed as above.
Mohs Rapid Report Verbiage: The area of clinically evident tumor was marked with skin marking ink and appropriately hatched.  The initial incision was made following the Mohs approach through the skin.  The specimen was taken to the lab, divided into the necessary number of pieces, chromacoded and processed according to the Mohs protocol.  This was repeated in successive stages until a tumor free defect was achieved.
Complex Repair Preamble Text (Leave Blank If You Do Not Want): Extensive wide undermining was performed.
Intermediate Repair Preamble Text (Leave Blank If You Do Not Want): Undermining was performed with blunt dissection.
Non-Graft Cartilage Fenestration Text: The cartilage was fenestrated with a 2mm punch biopsy to help facilitate healing.
Graft Cartilage Fenestration Text: The cartilage was fenestrated with a 2mm punch biopsy to help facilitate graft survival and healing.
Secondary Intention Text (Leave Blank If You Do Not Want): The defect will heal with secondary intention.
No Repair - Repaired With Adjacent Surgical Defect Text (Leave Blank If You Do Not Want): After obtaining clear surgical margins the defect was repaired concurrently with another surgical defect which was in close approximation.
Adjacent Tissue Transfer Text: The defect edges were debeveled with a #15 scalpel blade. Given the location of the defect and the proximity to free margins an adjacent tissue transfer was deemed most appropriate. Using a sterile surgical marker, an appropriate flap was drawn incorporating the defect and placing the expected incisions within the relaxed skin tension lines where possible. The area thus outlined was incised deep to adipose tissue with a #15 scalpel blade. The skin margins were undermined to an appropriate distance in all directions utilizing iris scissors and carried over to close the primary defect.
Advancement Flap (Single) Text: The defect edges were debeveled with a #15 scalpel blade.  Given the location of the defect and the proximity to free margins a single advancement flap was deemed most appropriate.  Using a sterile surgical marker, an appropriate advancement flap was drawn incorporating the defect and placing the expected incisions within the relaxed skin tension lines where possible.    The area thus outlined was incised deep to adipose tissue with a #15 scalpel blade.  The skin margins were undermined to an appropriate distance in all directions utilizing iris scissors.
Advancement Flap (Double) Text: The defect edges were debeveled with a #15 scalpel blade.  Given the location of the defect and the proximity to free margins a double advancement flap was deemed most appropriate.  Using a sterile surgical marker, the appropriate advancement flaps were drawn incorporating the defect and placing the expected incisions within the relaxed skin tension lines where possible.    The area thus outlined was incised deep to adipose tissue with a #15 scalpel blade.  The skin margins were undermined to an appropriate distance in all directions utilizing iris scissors.
Burow's Advancement Flap Text: The defect edges were debeveled with a #15 scalpel blade.  Given the location of the defect and the proximity to free margins a Burow's advancement flap was deemed most appropriate.  Using a sterile surgical marker, the appropriate advancement flap was drawn incorporating the defect and placing the expected incisions within the relaxed skin tension lines where possible.    The area thus outlined was incised deep to adipose tissue with a #15 scalpel blade.  The skin margins were undermined to an appropriate distance in all directions utilizing iris scissors.
Chonodrocutaneous Helical Advancement Flap Text: The defect edges were debeveled with a #15 scalpel blade. Given the location of the defect and the proximity to free margins a chondrocutaneous helical advancement flap was deemed most appropriate. Using a sterile surgical marker, the appropriate advancement flap was drawn incorporating the defect and placing the expected incisions within the relaxed skin tension lines where possible. The area thus outlined was incised deep to adipose tissue with a #15 scalpel blade. The skin margins were undermined to an appropriate distance in all directions utilizing iris scissors. Following this, the designed flap was advanced and carried over into the primary defect and sutured into place.
Crescentic Advancement Flap Text: The defect edges were debeveled with a #15 scalpel blade.  Given the location of the defect and the proximity to free margins a crescentic advancement flap was deemed most appropriate.  Using a sterile surgical marker, the appropriate advancement flap was drawn incorporating the defect and placing the expected incisions within the relaxed skin tension lines where possible.    The area thus outlined was incised deep to adipose tissue with a #15 scalpel blade.  The skin margins were undermined to an appropriate distance in all directions utilizing iris scissors.
A-T Advancement Flap Text: The defect edges were debeveled with a #15 scalpel blade.  Given the location of the defect, shape of the defect and the proximity to free margins an A-T advancement flap was deemed most appropriate.  Using a sterile surgical marker, an appropriate advancement flap was drawn incorporating the defect and placing the expected incisions within the relaxed skin tension lines where possible.    The area thus outlined was incised deep to adipose tissue with a #15 scalpel blade.  The skin margins were undermined to an appropriate distance in all directions utilizing iris scissors.
O-T Advancement Flap Text: The defect edges were debeveled with a #15 scalpel blade.  Given the location of the defect, shape of the defect and the proximity to free margins an O-T advancement flap was deemed most appropriate.  Using a sterile surgical marker, an appropriate advancement flap was drawn incorporating the defect and placing the expected incisions within the relaxed skin tension lines where possible.    The area thus outlined was incised deep to adipose tissue with a #15 scalpel blade.  The skin margins were undermined to an appropriate distance in all directions utilizing iris scissors.
O-L Flap Text: The defect edges were debeveled with a #15 scalpel blade.  Given the location of the defect, shape of the defect and the proximity to free margins an O-L flap was deemed most appropriate.  Using a sterile surgical marker, an appropriate advancement flap was drawn incorporating the defect and placing the expected incisions within the relaxed skin tension lines where possible.    The area thus outlined was incised deep to adipose tissue with a #15 scalpel blade.  The skin margins were undermined to an appropriate distance in all directions utilizing iris scissors.
O-Z Flap Text: The defect edges were debeveled with a #15 scalpel blade. Given the location of the defect, shape of the defect and the proximity to free margins an O-Z flap was deemed most appropriate. Using a sterile surgical marker, an appropriate transposition flap was drawn incorporating the defect and placing the expected incisions within the relaxed skin tension lines where possible. The area thus outlined was incised deep to adipose tissue with a #15 scalpel blade. The skin margins were undermined to an appropriate distance in all directions utilizing iris scissors. Following this, the designed flap was carried over into the primary defect and sutured into place.
Double O-Z Flap Text: The defect edges were debeveled with a #15 scalpel blade. Given the location of the defect, shape of the defect and the proximity to free margins a Double O-Z flap was deemed most appropriate. Using a sterile surgical marker, an appropriate transposition flap was drawn incorporating the defect and placing the expected incisions within the relaxed skin tension lines where possible. The area thus outlined was incised deep to adipose tissue with a #15 scalpel blade. The skin margins were undermined to an appropriate distance in all directions utilizing iris scissors. Following this, the designed flap was carried over into the primary defect and sutured into place.
V-Y Flap Text: The defect edges were debeveled with a #15 scalpel blade.  Given the location of the defect, shape of the defect and the proximity to free margins a V-Y flap was deemed most appropriate.  Using a sterile surgical marker, an appropriate advancement flap was drawn incorporating the defect and placing the expected incisions within the relaxed skin tension lines where possible.    The area thus outlined was incised deep to adipose tissue with a #15 scalpel blade.  The skin margins were undermined to an appropriate distance in all directions utilizing iris scissors.
Advancement-Rotation Flap Text: The defect edges were debeveled with a #15 scalpel blade.  Given the location of the defect, shape of the defect and the proximity to free margins an advancement-rotation flap was deemed most appropriate.  Using a sterile surgical marker, an appropriate flap was drawn incorporating the defect and placing the expected incisions within the relaxed skin tension lines where possible. The area thus outlined was incised deep to adipose tissue with a #15 scalpel blade.  The skin margins were undermined to an appropriate distance in all directions utilizing iris scissors.
Mercedes Flap Text: The defect edges were debeveled with a #15 scalpel blade.  Given the location of the defect, shape of the defect and the proximity to free margins a Mercedes flap was deemed most appropriate.  Using a sterile surgical marker, an appropriate advancement flap was drawn incorporating the defect and placing the expected incisions within the relaxed skin tension lines where possible. The area thus outlined was incised deep to adipose tissue with a #15 scalpel blade.  The skin margins were undermined to an appropriate distance in all directions utilizing iris scissors.
Modified Advancement Flap Text: The defect edges were debeveled with a #15 scalpel blade.  Given the location of the defect, shape of the defect and the proximity to free margins a modified advancement flap was deemed most appropriate.  Using a sterile surgical marker, an appropriate advancement flap was drawn incorporating the defect and placing the expected incisions within the relaxed skin tension lines where possible.    The area thus outlined was incised deep to adipose tissue with a #15 scalpel blade.  The skin margins were undermined to an appropriate distance in all directions utilizing iris scissors.
Mucosal Advancement Flap Text: Given the location of the defect, shape of the defect and the proximity to free margins a mucosal advancement flap was deemed most appropriate. Incisions were made with a 15 blade scalpel in the appropriate fashion along the cutaneous vermilion border and the mucosal lip. The remaining actinically damaged mucosal tissue was excised.  The mucosal advancement flap was then elevated to the gingival sulcus with care taken to preserve the neurovascular structures and advanced into the primary defect. Care was taken to ensure that precise realignment of the vermilion border was achieved.
Peng Advancement Flap Text: The defect edges were debeveled with a #15 scalpel blade. Given the location of the defect, shape of the defect and the proximity to free margins a Peng advancement flap was deemed most appropriate. Using a sterile surgical marker, an appropriate advancement flap was drawn incorporating the defect and placing the expected incisions within the relaxed skin tension lines where possible. The area thus outlined was incised deep to adipose tissue with a #15 scalpel blade. The skin margins were undermined to an appropriate distance in all directions utilizing iris scissors. Following this, the designed flap was advanced and carried over into the primary defect and sutured into place.
Hatchet Flap Text: The defect edges were debeveled with a #15 scalpel blade.  Given the location of the defect, shape of the defect and the proximity to free margins a hatchet flap was deemed most appropriate.  Using a sterile surgical marker, an appropriate hatchet flap was drawn incorporating the defect and placing the expected incisions within the relaxed skin tension lines where possible.    The area thus outlined was incised deep to adipose tissue with a #15 scalpel blade.  The skin margins were undermined to an appropriate distance in all directions utilizing iris scissors.
Rotation Flap Text: The defect edges were debeveled with a #15 scalpel blade.  Given the location of the defect, shape of the defect and the proximity to free margins a rotation flap was deemed most appropriate.  Using a sterile surgical marker, an appropriate rotation flap was drawn incorporating the defect and placing the expected incisions within the relaxed skin tension lines where possible.    The area thus outlined was incised deep to adipose tissue with a #15 scalpel blade.  The skin margins were undermined to an appropriate distance in all directions utilizing iris scissors.
Bilateral Rotation Flap Text: The defect edges were debeveled with a #15 scalpel blade. Given the location of the defect, shape of the defect and the proximity to free margins a bilateral rotation flap was deemed most appropriate. Using a sterile surgical marker, an appropriate rotation flap was drawn incorporating the defect and placing the expected incisions within the relaxed skin tension lines where possible. The area thus outlined was incised deep to adipose tissue with a #15 scalpel blade. The skin margins were undermined to an appropriate distance in all directions utilizing iris scissors. Following this, the designed flap was carried over into the primary defect and sutured into place.
Spiral Flap Text: The defect edges were debeveled with a #15 scalpel blade.  Given the location of the defect, shape of the defect and the proximity to free margins a spiral flap was deemed most appropriate.  Using a sterile surgical marker, an appropriate rotation flap was drawn incorporating the defect and placing the expected incisions within the relaxed skin tension lines where possible. The area thus outlined was incised deep to adipose tissue with a #15 scalpel blade.  The skin margins were undermined to an appropriate distance in all directions utilizing iris scissors.
Staged Advancement Flap Text: The defect edges were debeveled with a #15 scalpel blade. Given the location of the defect, shape of the defect and the proximity to free margins a staged advancement flap was deemed most appropriate. Using a sterile surgical marker, an appropriate advancement flap was drawn incorporating the defect and placing the expected incisions within the relaxed skin tension lines where possible. The area thus outlined was incised deep to adipose tissue with a #15 scalpel blade. The skin margins were undermined to an appropriate distance in all directions utilizing iris scissors. Following this, the designed flap was carried over into the primary defect and sutured into place.
Star Wedge Flap Text: The defect edges were debeveled with a #15 scalpel blade.  Given the location of the defect, shape of the defect and the proximity to free margins a star wedge flap was deemed most appropriate.  Using a sterile surgical marker, an appropriate rotation flap was drawn incorporating the defect and placing the expected incisions within the relaxed skin tension lines where possible. The area thus outlined was incised deep to adipose tissue with a #15 scalpel blade.  The skin margins were undermined to an appropriate distance in all directions utilizing iris scissors.
Transposition Flap Text: The defect edges were debeveled with a #15 scalpel blade.  Given the location of the defect and the proximity to free margins a transposition flap was deemed most appropriate.  Using a sterile surgical marker, an appropriate transposition flap was drawn incorporating the defect.    The area thus outlined was incised deep to adipose tissue with a #15 scalpel blade.  The skin margins were undermined to an appropriate distance in all directions utilizing iris scissors.
Muscle Hinge Flap Text: The defect edges were debeveled with a #15 scalpel blade.  Given the size, depth and location of the defect and the proximity to free margins a muscle hinge flap was deemed most appropriate.  Using a sterile surgical marker, an appropriate hinge flap was drawn incorporating the defect. The area thus outlined was incised with a #15 scalpel blade.  The skin margins were undermined to an appropriate distance in all directions utilizing iris scissors.
Mustarde Flap Text: The defect edges were debeveled with a #15 scalpel blade.  Given the size, depth and location of the defect and the proximity to free margins a Mustarde flap was deemed most appropriate. Using a sterile surgical marker, an appropriate flap was drawn incorporating the defect. The area thus outlined was incised with a #15 scalpel blade. The skin margins were undermined to an appropriate distance in all directions utilizing iris scissors. Following this, the designed flap was carried into the primary defect and sutured into place.
Nasal Turnover Hinge Flap Text: The defect edges were debeveled with a #15 scalpel blade.  Given the size, depth, location of the defect and the defect being full thickness a nasal turnover hinge flap was deemed most appropriate. Using a sterile surgical marker, an appropriate hinge flap was drawn incorporating the defect. The area thus outlined was incised with a #15 scalpel blade. The flap was designed to recreate the nasal mucosal lining and the alar rim. The skin margins were undermined to an appropriate distance in all directions utilizing iris scissors. Following this, the designed flap was carried over into the primary defect and sutured into place
Nasalis-Muscle-Based Myocutaneous Island Pedicle Flap Text: Using a #15 blade, an incision was made around the donor flap to the level of the nasalis muscle. Wide lateral undermining was then performed in both the subcutaneous plane above the nasalis muscle, and in a submuscular plane just above periosteum. This allowed the formation of a free nasalis muscle axial pedicle (based on the angular artery) which was still attached to the actual cutaneous flap, increasing its mobility and vascular viability. Hemostasis was obtained with pinpoint electrocoagulation. The flap was mobilized into position and the pivotal anchor points positioned and stabilized with buried interrupted sutures. Subcutaneous and dermal tissues were closed in a multilayered fashion with sutures. Tissue redundancies were excised, and the epidermal edges were apposed without significant tension and sutured with sutures.
Orbicularis Oris Muscle Flap Text: The defect edges were debeveled with a #15 scalpel blade.  Given that the defect affected the competency of the oral sphincter an orbicularis oris muscle flap was deemed most appropriate to restore this competency and normal muscle function.  Using a sterile surgical marker, an appropriate flap was drawn incorporating the defect. The area thus outlined was incised with a #15 scalpel blade. Following this, the designed flap was carried over into the primary defect and sutured into place.
Melolabial Transposition Flap Text: The defect edges were debeveled with a #15 scalpel blade.  Given the location of the defect and the proximity to free margins a melolabial flap was deemed most appropriate.  Using a sterile surgical marker, an appropriate melolabial transposition flap was drawn incorporating the defect.    The area thus outlined was incised deep to adipose tissue with a #15 scalpel blade.  The skin margins were undermined to an appropriate distance in all directions utilizing iris scissors.
Rhombic Flap Text: The defect edges were debeveled with a #15 scalpel blade.  Given the location of the defect and the proximity to free margins a rhombic flap was deemed most appropriate.  Using a sterile surgical marker, an appropriate rhombic flap was drawn incorporating the defect.    The area thus outlined was incised deep to adipose tissue with a #15 scalpel blade.  The skin margins were undermined to an appropriate distance in all directions utilizing iris scissors.
Rhomboid Transposition Flap Text: The defect edges were debeveled with a #15 scalpel blade. Given the location of the defect and the proximity to free margins a rhomboid transposition flap was deemed most appropriate. Using a sterile surgical marker, an appropriate rhomboid flap was drawn incorporating the defect. The area thus outlined was incised deep to adipose tissue with a #15 scalpel blade. The skin margins were undermined to an appropriate distance in all directions utilizing iris scissors. Following this, the designed flap was carried over into the primary defect and sutured into place.
Bi-Rhombic Flap Text: The defect edges were debeveled with a #15 scalpel blade.  Given the location of the defect and the proximity to free margins a bi-rhombic flap was deemed most appropriate.  Using a sterile surgical marker, an appropriate rhombic flap was drawn incorporating the defect. The area thus outlined was incised deep to adipose tissue with a #15 scalpel blade.  The skin margins were undermined to an appropriate distance in all directions utilizing iris scissors.
Helical Rim Advancement Flap Text: The defect edges were debeveled with a #15 blade scalpel.  Given the location of the defect and the proximity to free margins (helical rim) a double helical rim advancement flap was deemed most appropriate.  Using a sterile surgical marker, the appropriate advancement flaps were drawn incorporating the defect and placing the expected incisions between the helical rim and antihelix where possible.  The area thus outlined was incised through and through with a #15 scalpel blade.  With a skin hook and iris scissors, the flaps were gently and sharply undermined and freed up.
Bilateral Helical Rim Advancement Flap Text: The defect edges were debeveled with a #15 blade scalpel.  Given the location of the defect and the proximity to free margins (helical rim) a bilateral helical rim advancement flap was deemed most appropriate.  Using a sterile surgical marker, the appropriate advancement flaps were drawn incorporating the defect and placing the expected incisions between the helical rim and antihelix where possible.  The area thus outlined was incised through and through with a #15 scalpel blade.  With a skin hook and iris scissors, the flaps were gently and sharply undermined and freed up.
Ear Star Wedge Flap Text: The defect edges were debeveled with a #15 blade scalpel.  Given the location of the defect and the proximity to free margins (helical rim) an ear star wedge flap was deemed most appropriate.  Using a sterile surgical marker, the appropriate flap was drawn incorporating the defect and placing the expected incisions between the helical rim and antihelix where possible.  The area thus outlined was incised through and through with a #15 scalpel blade.
Banner Transposition Flap Text: The defect edges were debeveled with a #15 scalpel blade.  Given the location of the defect and the proximity to free margins a Banner transposition flap was deemed most appropriate.  Using a sterile surgical marker, an appropriate flap drawn around the defect. The area thus outlined was incised deep to adipose tissue with a #15 scalpel blade.  The skin margins were undermined to an appropriate distance in all directions utilizing iris scissors.
Bilobed Flap Text: The defect edges were debeveled with a #15 scalpel blade.  Given the location of the defect and the proximity to free margins a bilobe flap was deemed most appropriate.  Using a sterile surgical marker, an appropriate bilobe flap drawn around the defect.    The area thus outlined was incised deep to adipose tissue with a #15 scalpel blade.  The skin margins were undermined to an appropriate distance in all directions utilizing iris scissors.
Bilobed Transposition Flap Text: The defect edges were debeveled with a #15 scalpel blade.  Given the location of the defect and the proximity to free margins a bilobed transposition flap was deemed most appropriate.  Using a sterile surgical marker, an appropriate bilobe flap drawn around the defect.    The area thus outlined was incised deep to adipose tissue with a #15 scalpel blade.  The skin margins were undermined to an appropriate distance in all directions utilizing iris scissors.
Trilobed Flap Text: The defect edges were debeveled with a #15 scalpel blade.  Given the location of the defect and the proximity to free margins a trilobed flap was deemed most appropriate.  Using a sterile surgical marker, an appropriate trilobed flap drawn around the defect.    The area thus outlined was incised deep to adipose tissue with a #15 scalpel blade.  The skin margins were undermined to an appropriate distance in all directions utilizing iris scissors.
Dorsal Nasal Flap Text: The defect edges were debeveled with a #15 scalpel blade.  Given the location of the defect and the proximity to free margins a dorsal nasal flap was deemed most appropriate.  Using a sterile surgical marker, an appropriate dorsal nasal flap was drawn around the defect.    The area thus outlined was incised deep to adipose tissue with a #15 scalpel blade.  The skin margins were undermined to an appropriate distance in all directions utilizing iris scissors.
Island Pedicle Flap Text: The defect edges were debeveled with a #15 scalpel blade.  Given the location of the defect, shape of the defect and the proximity to free margins an island pedicle advancement flap was deemed most appropriate.  Using a sterile surgical marker, an appropriate advancement flap was drawn incorporating the defect, outlining the appropriate donor tissue and placing the expected incisions within the relaxed skin tension lines where possible.    The area thus outlined was incised deep to adipose tissue with a #15 scalpel blade.  The skin margins were undermined to an appropriate distance in all directions around the primary defect and laterally outward around the island pedicle utilizing iris scissors.  There was minimal undermining beneath the pedicle flap.
Island Pedicle Flap With Canthal Suspension Text: The defect edges were debeveled with a #15 scalpel blade.  Given the location of the defect, shape of the defect and the proximity to free margins an island pedicle advancement flap was deemed most appropriate.  Using a sterile surgical marker, an appropriate advancement flap was drawn incorporating the defect, outlining the appropriate donor tissue and placing the expected incisions within the relaxed skin tension lines where possible. The area thus outlined was incised deep to adipose tissue with a #15 scalpel blade.  The skin margins were undermined to an appropriate distance in all directions around the primary defect and laterally outward around the island pedicle utilizing iris scissors.  There was minimal undermining beneath the pedicle flap. A suspension suture was placed in the canthal tendon to prevent tension and prevent ectropion.
Alar Island Pedicle Flap Text: The defect edges were debeveled with a #15 scalpel blade.  Given the location of the defect, shape of the defect and the proximity to the alar rim an island pedicle advancement flap was deemed most appropriate.  Using a sterile surgical marker, an appropriate advancement flap was drawn incorporating the defect, outlining the appropriate donor tissue and placing the expected incisions within the nasal ala running parallel to the alar rim. The area thus outlined was incised with a #15 scalpel blade.  The skin margins were undermined minimally to an appropriate distance in all directions around the primary defect and laterally outward around the island pedicle utilizing iris scissors.  There was minimal undermining beneath the pedicle flap.
Double Island Pedicle Flap Text: The defect edges were debeveled with a #15 scalpel blade.  Given the location of the defect, shape of the defect and the proximity to free margins a double island pedicle advancement flap was deemed most appropriate.  Using a sterile surgical marker, an appropriate advancement flap was drawn incorporating the defect, outlining the appropriate donor tissue and placing the expected incisions within the relaxed skin tension lines where possible.    The area thus outlined was incised deep to adipose tissue with a #15 scalpel blade.  The skin margins were undermined to an appropriate distance in all directions around the primary defect and laterally outward around the island pedicle utilizing iris scissors.  There was minimal undermining beneath the pedicle flap.
Island Pedicle Flap-Requiring Vessel Identification Text: The defect edges were debeveled with a #15 scalpel blade.  Given the location of the defect, shape of the defect and the proximity to free margins an island pedicle advancement flap was deemed most appropriate.  Using a sterile surgical marker, an appropriate advancement flap was drawn, based on the axial vessel mentioned above, incorporating the defect, outlining the appropriate donor tissue and placing the expected incisions within the relaxed skin tension lines where possible.    The area thus outlined was incised deep to adipose tissue with a #15 scalpel blade.  The skin margins were undermined to an appropriate distance in all directions around the primary defect and laterally outward around the island pedicle utilizing iris scissors.  There was minimal undermining beneath the pedicle flap.
Keystone Flap Text: The defect edges were debeveled with a #15 scalpel blade.  Given the location of the defect, shape of the defect a keystone flap was deemed most appropriate.  Using a sterile surgical marker, an appropriate keystone flap was drawn incorporating the defect, outlining the appropriate donor tissue and placing the expected incisions within the relaxed skin tension lines where possible. The area thus outlined was incised deep to adipose tissue with a #15 scalpel blade.  The skin margins were undermined to an appropriate distance in all directions around the primary defect and laterally outward around the flap utilizing iris scissors.
O-T Plasty Text: The defect edges were debeveled with a #15 scalpel blade.  Given the location of the defect, shape of the defect and the proximity to free margins an O-T plasty was deemed most appropriate.  Using a sterile surgical marker, an appropriate O-T plasty was drawn incorporating the defect and placing the expected incisions within the relaxed skin tension lines where possible.    The area thus outlined was incised deep to adipose tissue with a #15 scalpel blade.  The skin margins were undermined to an appropriate distance in all directions utilizing iris scissors.
O-Z Plasty Text: The defect edges were debeveled with a #15 scalpel blade.  Given the location of the defect, shape of the defect and the proximity to free margins an O-Z plasty (double transposition flap) was deemed most appropriate.  Using a sterile surgical marker, the appropriate transposition flaps were drawn incorporating the defect and placing the expected incisions within the relaxed skin tension lines where possible.    The area thus outlined was incised deep to adipose tissue with a #15 scalpel blade.  The skin margins were undermined to an appropriate distance in all directions utilizing iris scissors.  Hemostasis was achieved with electrocautery.  The flaps were then transposed into place, one clockwise and the other counterclockwise, and anchored with interrupted buried subcutaneous sutures.
Double O-Z Plasty Text: The defect edges were debeveled with a #15 scalpel blade. Given the location of the defect, shape of the defect and the proximity to free margins a Double O-Z plasty (double transposition flap) was deemed most appropriate. Using a sterile surgical marker, the appropriate transposition flaps were drawn incorporating the defect and placing the expected incisions within the relaxed skin tension lines where possible. The area thus outlined was incised deep to adipose tissue with a #15 scalpel blade. The skin margins were undermined to an appropriate distance in all directions utilizing iris scissors. Hemostasis was achieved with electrocautery. The flaps were then transposed and carried over into place, one clockwise and the other counterclockwise, and anchored with interrupted buried subcutaneous sutures.
V-Y Plasty Text: The defect edges were debeveled with a #15 scalpel blade.  Given the location of the defect, shape of the defect and the proximity to free margins an V-Y advancement flap was deemed most appropriate.  Using a sterile surgical marker, an appropriate advancement flap was drawn incorporating the defect and placing the expected incisions within the relaxed skin tension lines where possible.    The area thus outlined was incised deep to adipose tissue with a #15 scalpel blade.  The skin margins were undermined to an appropriate distance in all directions utilizing iris scissors.
H Plasty Text: Given the location of the defect, shape of the defect and the proximity to free margins a H-plasty was deemed most appropriate for repair.  Using a sterile surgical marker, the appropriate advancement arms of the H-plasty were drawn incorporating the defect and placing the expected incisions within the relaxed skin tension lines where possible. The area thus outlined was incised deep to adipose tissue with a #15 scalpel blade. The skin margins were undermined to an appropriate distance in all directions utilizing iris scissors.  The opposing advancement arms were then advanced into place in opposite direction and anchored with interrupted buried subcutaneous sutures.
W Plasty Text: The lesion was extirpated to the level of the fat with a #15 scalpel blade.  Given the location of the defect, shape of the defect and the proximity to free margins a W-plasty was deemed most appropriate for repair.  Using a sterile surgical marker, the appropriate transposition arms of the W-plasty were drawn incorporating the defect and placing the expected incisions within the relaxed skin tension lines where possible.    The area thus outlined was incised deep to adipose tissue with a #15 scalpel blade.  The skin margins were undermined to an appropriate distance in all directions utilizing iris scissors.  The opposing transposition arms were then transposed into place in opposite direction and anchored with interrupted buried subcutaneous sutures.
Z Plasty Text: The lesion was extirpated to the level of the fat with a #15 scalpel blade.  Given the location of the defect, shape of the defect and the proximity to free margins a Z-plasty was deemed most appropriate for repair.  Using a sterile surgical marker, the appropriate transposition arms of the Z-plasty were drawn incorporating the defect and placing the expected incisions within the relaxed skin tension lines where possible.    The area thus outlined was incised deep to adipose tissue with a #15 scalpel blade.  The skin margins were undermined to an appropriate distance in all directions utilizing iris scissors.  The opposing transposition arms were then transposed into place in opposite direction and anchored with interrupted buried subcutaneous sutures.
Double Z Plasty Text: The lesion was extirpated to the level of the fat with a #15 scalpel blade. Given the location of the defect, shape of the defect and the proximity to free margins a double Z-plasty was deemed most appropriate for repair. Using a sterile surgical marker, the appropriate transposition arms of the double Z-plasty were drawn incorporating the defect and placing the expected incisions within the relaxed skin tension lines where possible. The area thus outlined was incised deep to adipose tissue with a #15 scalpel blade. The skin margins were undermined to an appropriate distance in all directions utilizing iris scissors. The opposing transposition arms were then transposed and carried over into place in opposite direction and anchored with interrupted buried subcutaneous sutures.
Zygomaticofacial Flap Text: Given the location of the defect, shape of the defect and the proximity to free margins a zygomaticofacial flap was deemed most appropriate for repair. Using a sterile surgical marker, the appropriate flap was drawn incorporating the defect and placing the expected incisions within the relaxed skin tension lines where possible. The area thus outlined was incised deep to adipose tissue with a #15 scalpel blade with preservation of a vascular pedicle.  The skin margins were undermined to an appropriate distance in all directions utilizing iris scissors. The flap was then carried over into the defect and anchored with interrupted buried subcutaneous sutures.
Cheek Interpolation Flap Text: A decision was made to reconstruct the defect utilizing an interpolation axial flap and a staged reconstruction.  A telfa template was made of the defect.  This telfa template was then used to outline the Cheek Interpolation flap.  The donor area for the pedicle flap was then injected with anesthesia.  The flap was excised through the skin and subcutaneous tissue down to the layer of the underlying musculature.  The interpolation flap was carefully excised within this deep plane to maintain its blood supply.  The edges of the donor site were undermined.   The donor site was closed in a primary fashion.  The pedicle was then rotated into position and sutured.  Once the tube was sutured into place, adequate blood supply was confirmed with blanching and refill.  The pedicle was then wrapped with xeroform gauze and dressed appropriately with a telfa and gauze bandage to ensure continued blood supply and protect the attached pedicle.
Cheek-To-Nose Interpolation Flap Text: A decision was made to reconstruct the defect utilizing an interpolation axial flap and a staged reconstruction.  A telfa template was made of the defect.  This telfa template was then used to outline the Cheek-To-Nose Interpolation flap.  The donor area for the pedicle flap was then injected with anesthesia.  The flap was excised through the skin and subcutaneous tissue down to the layer of the underlying musculature.  The interpolation flap was carefully excised within this deep plane to maintain its blood supply.  The edges of the donor site were undermined.   The donor site was closed in a primary fashion.  The pedicle was then rotated into position and sutured.  Once the tube was sutured into place, adequate blood supply was confirmed with blanching and refill.  The pedicle was then wrapped with xeroform gauze and dressed appropriately with a telfa and gauze bandage to ensure continued blood supply and protect the attached pedicle.
Interpolation Flap Text: A decision was made to reconstruct the defect utilizing an interpolation axial flap and a staged reconstruction.  A telfa template was made of the defect.  This telfa template was then used to outline the interpolation flap.  The donor area for the pedicle flap was then injected with anesthesia.  The flap was excised through the skin and subcutaneous tissue down to the layer of the underlying musculature.  The interpolation flap was carefully excised within this deep plane to maintain its blood supply.  The edges of the donor site were undermined.   The donor site was closed in a primary fashion.  The pedicle was then rotated into position and sutured.  Once the tube was sutured into place, adequate blood supply was confirmed with blanching and refill.  The pedicle was then wrapped with xeroform gauze and dressed appropriately with a telfa and gauze bandage to ensure continued blood supply and protect the attached pedicle.
Melolabial Interpolation Flap Text: A decision was made to reconstruct the defect utilizing an interpolation axial flap and a staged reconstruction.  A telfa template was made of the defect.  This telfa template was then used to outline the melolabial interpolation flap.  The donor area for the pedicle flap was then injected with anesthesia.  The flap was excised through the skin and subcutaneous tissue down to the layer of the underlying musculature.  The pedicle flap was carefully excised within this deep plane to maintain its blood supply.  The edges of the donor site were undermined.   The donor site was closed in a primary fashion.  The pedicle was then rotated into position and sutured.  Once the tube was sutured into place, adequate blood supply was confirmed with blanching and refill.  The pedicle was then wrapped with xeroform gauze and dressed appropriately with a telfa and gauze bandage to ensure continued blood supply and protect the attached pedicle.
Mastoid Interpolation Flap Text: A decision was made to reconstruct the defect utilizing an interpolation axial flap and a staged reconstruction.  A telfa template was made of the defect.  This telfa template was then used to outline the mastoid interpolation flap.  The donor area for the pedicle flap was then injected with anesthesia.  The flap was excised through the skin and subcutaneous tissue down to the layer of the underlying musculature.  The pedicle flap was carefully excised within this deep plane to maintain its blood supply.  The edges of the donor site were undermined.   The donor site was closed in a primary fashion.  The pedicle was then rotated into position and sutured.  Once the tube was sutured into place, adequate blood supply was confirmed with blanching and refill.  The pedicle was then wrapped with xeroform gauze and dressed appropriately with a telfa and gauze bandage to ensure continued blood supply and protect the attached pedicle.
Posterior Auricular Interpolation Flap Text: A decision was made to reconstruct the defect utilizing an interpolation axial flap and a staged reconstruction.  A telfa template was made of the defect.  This telfa template was then used to outline the posterior auricular interpolation flap.  The donor area for the pedicle flap was then injected with anesthesia.  The flap was excised through the skin and subcutaneous tissue down to the layer of the underlying musculature.  The pedicle flap was carefully excised within this deep plane to maintain its blood supply.  The edges of the donor site were undermined.   The donor site was closed in a primary fashion.  The pedicle was then rotated into position and sutured.  Once the tube was sutured into place, adequate blood supply was confirmed with blanching and refill.  The pedicle was then wrapped with xeroform gauze and dressed appropriately with a telfa and gauze bandage to ensure continued blood supply and protect the attached pedicle.
Paramedian Forehead Flap Text: A decision was made to reconstruct the defect utilizing an interpolation axial flap and a staged reconstruction.  A telfa template was made of the defect.  This telfa template was then used to outline the paramedian forehead pedicle flap.  The donor area for the pedicle flap was then injected with anesthesia.  The flap was excised through the skin and subcutaneous tissue down to the layer of the underlying musculature.  The pedicle flap was carefully excised within this deep plane to maintain its blood supply.  The edges of the donor site were undermined.   The donor site was closed in a primary fashion.  The pedicle was then rotated into position and sutured.  Once the tube was sutured into place, adequate blood supply was confirmed with blanching and refill.  The pedicle was then wrapped with xeroform gauze and dressed appropriately with a telfa and gauze bandage to ensure continued blood supply and protect the attached pedicle.
Abbe Flap (Upper To Lower Lip) Text: The defect of the lower lip was assessed and measured.  Given the location and size of the defect, an Abbe flap was deemed most appropriate. Using a sterile surgical marker, an appropriate Abbe flap was measured and drawn on the upper lip. Local anesthesia was then infiltrated.  A scalpel was then used to incise the upper lip through and through the skin, vermilion, muscle and mucosa, leaving the flap pedicled on the opposite side.  The flap was then rotated and transferred to the lower lip defect.  The flap was then sutured into place with a three layer technique, closing the orbicularis oris muscle layer with subcutaneous buried sutures, followed by a mucosal layer and an epidermal layer.
Abbe Flap (Lower To Upper Lip) Text: The defect of the upper lip was assessed and measured.  Given the location and size of the defect, an Abbe flap was deemed most appropriate. Using a sterile surgical marker, an appropriate Abbe flap was measured and drawn on the lower lip. Local anesthesia was then infiltrated. A scalpel was then used to incise the upper lip through and through the skin, vermilion, muscle and mucosa, leaving the flap pedicled on the opposite side.  The flap was then rotated and transferred to the lower lip defect.  The flap was then sutured into place with a three layer technique, closing the orbicularis oris muscle layer with subcutaneous buried sutures, followed by a mucosal layer and an epidermal layer.
Estlander Flap (Upper To Lower Lip) Text: The defect of the lower lip was assessed and measured.  Given the location and size of the defect, an Estlander flap was deemed most appropriate. Using a sterile surgical marker, an appropriate Estlander flap was measured and drawn on the upper lip. Local anesthesia was then infiltrated. A scalpel was then used to incise the lateral aspect of the flap, through skin, muscle and mucosa, leaving the flap pedicled medially.  The flap was then rotated and positioned to fill the lower lip defect.  The flap was then sutured into place with a three layer technique, closing the orbicularis oris muscle layer with subcutaneous buried sutures, followed by a mucosal layer and an epidermal layer.
Cheiloplasty (Less Than 50%) Text: A decision was made to reconstruct the defect with a  cheiloplasty.  The defect was undermined extensively.  Additional obicularis oris muscle was excised with a 15 blade scalpel.  The defect was converted into a full thickness wedge, of less than 50% of the vertical height of the lip, to facilite a better cosmetic result.  Small vessels were then tied off with 5-0 monocyrl. The obicularis oris, superficial fascia, adipose and dermis were then reapproximated.  After the deeper layers were approximated the epidermis was reapproximated with particular care given to realign the vermilion border.
Cheiloplasty (Complex) Text: A decision was made to reconstruct the defect with a  cheiloplasty.  The defect was undermined extensively.  Additional obicularis oris muscle was excised with a 15 blade scalpel.  The defect was converted into a full thickness wedge to facilite a better cosmetic result.  Small vessels were then tied off with 5-0 monocyrl. The obicularis oris, superficial fascia, adipose and dermis were then reapproximated.  After the deeper layers were approximated the epidermis was reapproximated with particular care given to realign the vermilion border.
Ear Wedge Repair Text: A wedge excision was completed by carrying down an excision through the full thickness of the ear and cartilage with an inward facing Burow's triangle. The wound was then closed in a layered fashion.
Full Thickness Lip Wedge Repair (Flap) Text: Given the location of the defect and the proximity to free margins a full thickness wedge repair was deemed most appropriate.  Using a sterile surgical marker, the appropriate repair was drawn incorporating the defect and placing the expected incisions perpendicular to the vermilion border.  The vermilion border was also meticulously outlined to ensure appropriate reapproximation during the repair.  The area thus outlined was incised through and through with a #15 scalpel blade.  The muscularis and dermis were reaproximated with deep sutures following hemostasis. Care was taken to realign the vermilion border before proceeding with the superficial closure.  Once the vermilion was realigned the superfical and mucosal closure was finished.
Ftsg Text: The defect edges were debeveled with a #15 scalpel blade.  Given the location of the defect, shape of the defect and the proximity to free margins a full thickness skin graft was deemed most appropriate.  Using a sterile surgical marker, the primary defect shape was transferred to the donor site. The area thus outlined was incised deep to adipose tissue with a #15 scalpel blade.  The harvested graft was then trimmed of adipose tissue until only dermis and epidermis was left.  The skin margins of the secondary defect were undermined to an appropriate distance in all directions utilizing iris scissors.  The secondary defect was closed with interrupted buried subcutaneous sutures.  The skin edges were then re-apposed with running  sutures.  The skin graft was then placed in the primary defect and oriented appropriately.
Split-Thickness Skin Graft Text: The defect edges were debeveled with a #15 scalpel blade.  Given the location of the defect, shape of the defect and the proximity to free margins a split thickness skin graft was deemed most appropriate.  Using a sterile surgical marker, the primary defect shape was transferred to the donor site. The split thickness graft was then harvested.  The skin graft was then placed in the primary defect and oriented appropriately.
Pinch Graft Text: The defect edges were debeveled with a #15 scalpel blade. Given the location of the defect, shape of the defect and the proximity to free margins a pinch graft was deemed most appropriate. Using a sterile surgical marker, the primary defect shape was transferred to the donor site. The area thus outlined was incised deep to adipose tissue with a #15 scalpel blade.  The harvested graft was then trimmed of adipose tissue until only dermis and epidermis was left. The skin margins of the secondary defect were undermined to an appropriate distance in all directions utilizing iris scissors.  The secondary defect was closed with interrupted buried subcutaneous sutures.  The skin edges were then re-apposed with running  sutures.  The skin graft was then placed in the primary defect and oriented appropriately.
Burow's Graft Text: The defect edges were debeveled with a #15 scalpel blade. Given the location of the defect, shape of the defect, the proximity to free margins and the presence of a standing cone deformity a Burow's skin graft was deemed most appropriate. The standing cone was removed and this tissue was then trimmed to the shape of the primary defect. The adipose tissue was also removed until only dermis and epidermis were left.  The skin margins of the secondary defect were undermined to an appropriate distance in all directions utilizing iris scissors.  The secondary defect was closed with interrupted buried subcutaneous sutures.  The skin edges were then re-apposed with running  sutures.  The skin graft was then placed in the primary defect and oriented appropriately.
Cartilage Graft Text: The defect edges were debeveled with a #15 scalpel blade.  Given the location of the defect, shape of the defect, the fact the defect involved a full thickness cartilage defect a cartilage graft was deemed most appropriate.  An appropriate donor site was identified, cleansed, and anesthetized. The cartilage graft was then harvested and transferred to the recipient site, oriented appropriately and then sutured into place.  The secondary defect was then repaired using a primary closure.
Composite Graft Text: The defect edges were debeveled with a #15 scalpel blade.  Given the location of the defect, shape of the defect, the proximity to free margins and the fact the defect was full thickness a composite graft was deemed most appropriate.  The defect was outline and then transferred to the donor site.  A full thickness graft was then excised from the donor site. The graft was then placed in the primary defect, oriented appropriately and then sutured into place.  The secondary defect was then repaired using a primary closure.
Epidermal Autograft Text: The defect edges were debeveled with a #15 scalpel blade.  Given the location of the defect, shape of the defect and the proximity to free margins an epidermal autograft was deemed most appropriate.  Using a sterile surgical marker, the primary defect shape was transferred to the donor site. The epidermal graft was then harvested.  The skin graft was then placed in the primary defect and oriented appropriately.
Dermal Autograft Text: The defect edges were debeveled with a #15 scalpel blade.  Given the location of the defect, shape of the defect and the proximity to free margins a dermal autograft was deemed most appropriate.  Using a sterile surgical marker, the primary defect shape was transferred to the donor site. The area thus outlined was incised deep to adipose tissue with a #15 scalpel blade.  The harvested graft was then trimmed of adipose and epidermal tissue until only dermis was left.  The skin graft was then placed in the primary defect and oriented appropriately.
Skin Substitute Text: The defect edges were debeveled with a #15 scalpel blade.  Given the location of the defect, shape of the defect and the proximity to free margins a skin substitute graft was deemed most appropriate.  The graft material was trimmed to fit the size of the defect. The graft was then placed in the primary defect and oriented appropriately.
Tissue Cultured Epidermal Autograft Text: The defect edges were debeveled with a #15 scalpel blade.  Given the location of the defect, shape of the defect and the proximity to free margins a tissue cultured epidermal autograft was deemed most appropriate.  The graft was then trimmed to fit the size of the defect.  The graft was then placed in the primary defect and oriented appropriately.
Xenograft Text: The defect edges were debeveled with a #15 scalpel blade.  Given the location of the defect, shape of the defect and the proximity to free margins a xenograft was deemed most appropriate.  The graft was then trimmed to fit the size of the defect.  The graft was then placed in the primary defect and oriented appropriately.
Purse String (Simple) Text: Given the location of the defect and the characteristics of the surrounding skin a purse string closure was deemed most appropriate.  Undermining was performed circumfirentially around the surgical defect.  A purse string suture was then placed and tightened.
Purse String (Intermediate) Text: Given the location of the defect and the characteristics of the surrounding skin a purse string intermediate closure was deemed most appropriate.  Undermining was performed circumfirentially around the surgical defect.  A purse string suture was then placed and tightened.
Partial Purse String (Simple) Text: Given the location of the defect and the characteristics of the surrounding skin a simple purse string closure was deemed most appropriate.  Undermining was performed circumfirentially around the surgical defect.  A purse string suture was then placed and tightened. Wound tension only allowed a partial closure of the circular defect.
Partial Purse String (Intermediate) Text: Given the location of the defect and the characteristics of the surrounding skin an intermediate purse string closure was deemed most appropriate.  Undermining was performed circumfirentially around the surgical defect.  A purse string suture was then placed and tightened. Wound tension only allowed a partial closure of the circular defect.
Localized Dermabrasion With Wire Brush Text: The patient was draped in routine manner.  Localized dermabrasion using 3 x 17 mm wire brush was performed in routine manner to papillary dermis. This spot dermabrasion is being performed to complete skin cancer reconstruction. It also will eliminate the other sun damaged precancerous cells that are known to be part of the regional effect of a lifetime's worth of sun exposure. This localized dermabrasion is therapeutic and should not be considered cosmetic in any regard.
Tarsorrhaphy Text: A tarsorrhaphy was performed using Frost sutures.
Intermediate Repair And Flap Additional Text (Will Appearing After The Standard Complex Repair Text): The intermediate repair was not sufficient to completely close the primary defect. The remaining additional defect was repaired with the flap mentioned below.
Intermediate Repair And Graft Additional Text (Will Appearing After The Standard Complex Repair Text): The intermediate repair was not sufficient to completely close the primary defect. The remaining additional defect was repaired with the graft mentioned below.
Complex Repair And Flap Additional Text (Will Appearing After The Standard Complex Repair Text): The complex repair was not sufficient to completely close the primary defect. The remaining additional defect was repaired with the flap mentioned below.
Complex Repair And Graft Additional Text (Will Appearing After The Standard Complex Repair Text): The complex repair was not sufficient to completely close the primary defect. The remaining additional defect was repaired with the graft mentioned below.
Eyelid Full Thickness Repair - 92964: The eyelid defect was full thickness which required a wedge repair of the eyelid. Special care was taken to ensure that the eyelid margin was realligned when placing sutures.
Manual Repair Warning Statement: We plan on removing the manually selected variable below in favor of our much easier automatic structured text blocks found in the previous tab. We decided to do this to help make the flow better and give you the full power of structured data. Manual selection is never going to be ideal in our platform and I would encourage you to avoid using manual selection from this point on, especially since I will be sunsetting this feature. It is important that you do one of two things with the customized text below. First, you can save all of the text in a word file so you can have it for future reference. Second, transfer the text to the appropriate area in the Library tab. Lastly, if there is a flap or graft type which we do not have you need to let us know right away so I can add it in before the variable is hidden. No need to panic, we plan to give you roughly 6 months to make the change.
Same Histology In Subsequent Stages Text: The pattern and morphology of the tumor is as described in the first stage.
No Residual Tumor Seen Histology Text: There were no malignant cells seen in the sections examined.
Inflammation Suggestive Of Cancer Camouflage Histology Text: There was a dense lymphocytic infiltrate which prevented adequate histologic evaluation of adjacent structures.
Bcc Histology Text: There were numerous aggregates of basaloid cells.
Bcc Infiltrative Histology Text: There were numerous aggregates of basaloid cells demonstrating an infiltrative pattern.
Mart-1 - Positive Histology Text: MART-1 staining demonstrates areas of higher density and clustering of melanocytes with Pagetoid spread upwards within the epidermis. The surgical margins are positive for tumor cells.
Mart-1 - Negative Histology Text: MART-1 staining demonstrates a normal density and pattern of melanocytes along the dermal-epidermal junction. The surgical margins are negative for tumor cells.
Information: Selecting Yes will display possible errors in your note based on the variables you have selected. This validation is only offered as a suggestion for you. PLEASE NOTE THAT THE VALIDATION TEXT WILL BE REMOVED WHEN YOU FINALIZE YOUR NOTE. IF YOU WANT TO FAX A PRELIMINARY NOTE YOU WILL NEED TO TOGGLE THIS TO 'NO' IF YOU DO NOT WANT IT IN YOUR FAXED NOTE.

## 2024-01-22 ENCOUNTER — OFFICE VISIT (OUTPATIENT)
Dept: MEDICAL GROUP | Facility: PHYSICIAN GROUP | Age: 74
End: 2024-01-22
Payer: MEDICARE

## 2024-01-22 VITALS
WEIGHT: 175 LBS | HEIGHT: 64 IN | HEART RATE: 82 BPM | RESPIRATION RATE: 16 BRPM | SYSTOLIC BLOOD PRESSURE: 118 MMHG | TEMPERATURE: 98.5 F | DIASTOLIC BLOOD PRESSURE: 72 MMHG | OXYGEN SATURATION: 98 % | BODY MASS INDEX: 29.88 KG/M2

## 2024-01-22 DIAGNOSIS — M81.0 AGE-RELATED OSTEOPOROSIS WITHOUT CURRENT PATHOLOGICAL FRACTURE: ICD-10-CM

## 2024-01-22 DIAGNOSIS — C44.1121 BASAL CELL CARCINOMA (BCC) OF SKIN OF RIGHT UPPER EYELID INCLUDING CANTHUS: ICD-10-CM

## 2024-01-22 PROCEDURE — 3078F DIAST BP <80 MM HG: CPT | Performed by: NURSE PRACTITIONER

## 2024-01-22 PROCEDURE — 99214 OFFICE O/P EST MOD 30 MIN: CPT | Performed by: NURSE PRACTITIONER

## 2024-01-22 PROCEDURE — 3074F SYST BP LT 130 MM HG: CPT | Performed by: NURSE PRACTITIONER

## 2024-01-22 ASSESSMENT — PATIENT HEALTH QUESTIONNAIRE - PHQ9: CLINICAL INTERPRETATION OF PHQ2 SCORE: 0

## 2024-01-22 ASSESSMENT — FIBROSIS 4 INDEX: FIB4 SCORE: 1.17

## 2024-01-22 NOTE — ASSESSMENT & PLAN NOTE
New to examiner. Patient follows with dermatology. Had a biopsy of a lesion on the right upper nose near eye that was positive for BCC. Had a Mohs procedure last week.

## 2024-01-22 NOTE — ASSESSMENT & PLAN NOTE
Chronic, worsening. DEXA completed in 2021 showed osteopenia in the spine and proximal left femur. Most recent DEXA shows improvement in bone density of the left femur to normal range, but osteoporosis in the lumbar spine. She has not been as active lately. Continues vitamin d and calcium supplement, denies recent falls or fractures. Would like to discuss treatment.

## 2024-01-22 NOTE — PROGRESS NOTES
CC: Diagnoses of Age-related osteoporosis without current pathological fracture and Basal cell carcinoma (BCC) of skin of right upper eyelid including canthus were pertinent to this visit.                                                                                                                                       HPI:   Shannan presents today with the following concerns:    Basal cell carcinoma (BCC) of skin of right upper eyelid including canthus  New to examiner. Patient follows with dermatology. Had a biopsy of a lesion on the right upper nose near eye that was positive for BCC. Had a Mohs procedure last week.    Age-related osteoporosis without current pathological fracture  Chronic, worsening. DEXA completed in 2021 showed osteopenia in the spine and proximal left femur. Most recent DEXA shows improvement in bone density of the left femur to normal range, but osteoporosis in the lumbar spine. She has not been as active lately. Continues vitamin d and calcium supplement, denies recent falls or fractures. Would like to discuss treatment.    Patient Active Problem List    Diagnosis Date Noted    Basal cell carcinoma (BCC) of skin of right upper eyelid including canthus 01/22/2024    Primary osteoarthritis of both shoulders 12/19/2022    Overweight (BMI 25.0-29.9) 08/17/2022    Urinary frequency 07/26/2022    Bilateral primary osteoarthritis of knee 01/31/2022    Paronychia of great toe, right 08/17/2021    Lymphedema of both lower extremities 03/03/2021    Varicose vein of leg 08/10/2018    Gastroesophageal reflux disease without esophagitis 12/22/2017    Thyroid mass 06/23/2017    Vitamin D insufficiency 06/22/2017    OM (onychomycosis) 06/22/2017    AK (actinic keratosis) 06/23/2016    Chronic pain of right knee 06/23/2016    Mixed hyperlipidemia 06/23/2016    Right-sided thoracic back pain 06/23/2016    Heart murmur     Age-related osteoporosis without current pathological fracture      Current Outpatient  "Medications   Medication Sig Dispense Refill    atorvastatin (LIPITOR) 20 MG Tab TAKE 1 TABLET BY MOUTH EVERY DAY IN THE EVENING 100 Tablet 3    triamcinolone acetonide (KENALOG) 0.1 % Cream Apply 1 Application to affected area(s) 2 times a day as needed. 1 Tube 11    Omega-3 Fatty Acids (OMEGA 3 500 PO) Take 1,000 mg by mouth.      famotidine (PEPCID) 20 MG Tab Take 1 Tab by mouth 2 times a day. 60 Tab 0    Calcium Carbonate 600 MG Tab Take 1,200 mg by mouth every day.      Cholecalciferol (VITAMIN D3) 5000 units Tab Take 5,000 Units by mouth every day.       No current facility-administered medications for this visit.     Allergies as of 01/22/2024 - Reviewed 01/22/2024   Allergen Reaction Noted    Pcn [penicillins] Hives 06/09/2011      ROS:  Constitutional: No fevers, chills, malaise/fatigue.  Eyes: No eye pain.  ENT: No sore throat, congestion.   Resp: No cough, shortness of breath.  CV: No chest pain, leg swelling, palpitations.  GI: No nausea/vomiting, abdominal pain, constipation, diarrhea.  : No dysuria, hematuria.  MSK: No weakness.  Skin: bandage on top of nose, bilateral eye bruising. No rashes.  Neuro: No dizziness, weakness, headaches.  Psych: No suicidal ideations.    All remaining systems reviewed and found to be negative, except as stated above.      /72 (BP Location: Left arm, Patient Position: Sitting, BP Cuff Size: Adult)   Pulse 82   Temp 36.9 °C (98.5 °F) (Temporal)   Resp 16   Ht 1.626 m (5' 4\")   Wt 79.4 kg (175 lb)   LMP 01/01/2006 (Within Years)   SpO2 98%   BMI 30.04 kg/m²     Physical Exam:  General: Well nourished, well developed female in NAD, awake and conversant.  Eyes: Normal conjunctiva, anicteric.  Round symmetrical pupils.  ENT: Hearing grossly intact.  No nasal discharge.  Neck: Neck is supple.  No masses or thyromegaly.  CV: No lower extremity edema.  Respiratory: Respirations are nonlabored.  No wheezing.  Abdomen: Non-Distended.  Skin: Warm.  No rashes or " ulcers. Bandage on top of nose, bilateral eye bruising.  MSK: Normal ambulation.  No clubbing or cyanosis.  Neuro: Sensation and CN II-XII grossly normal.  Psych: Alert and oriented.  Cooperative, appropriate mood and affect, normal judgment.      Assessment and Plan.   73 y.o. female with the following issues:  1. Age-related osteoporosis without current pathological fracture  Chronic, worsening. Encourage patient to continue to take vitamin d and calcium supplement daily. Encourage a minimum of 150 minutes of weight bearing exercises weekly. Due for updated DEXA in November 2025.  Discussed starting alendronate for treatment of osteoporosis, patient would like to work on weightbearing exercises and repeat bone density in November 2025 and consider at that time if needed.    2. Basal cell carcinoma (BCC) of skin of right upper eyelid including canthus  New to examiner.  Continue to follow with dermatology.    Return in about 8 months (around 10/3/2024) for AWV, As needed.     Please note that this dictation was created using voice recognition software. I have worked with consultants from the vendor as well as technical experts from [employer] to optimize the interface. I have made every reasonable attempt to correct obvious errors, but I expect that there are errors of grammar and possibly content that I did not discover before finalizing the note.

## 2024-01-26 ENCOUNTER — APPOINTMENT (RX ONLY)
Dept: URBAN - METROPOLITAN AREA CLINIC 22 | Facility: CLINIC | Age: 74
Setting detail: DERMATOLOGY
End: 2024-01-26

## 2024-01-26 DIAGNOSIS — Z48.817 ENCOUNTER FOR SURGICAL AFTERCARE FOLLOWING SURGERY ON THE SKIN AND SUBCUTANEOUS TISSUE: ICD-10-CM

## 2024-01-26 PROCEDURE — 99024 POSTOP FOLLOW-UP VISIT: CPT

## 2024-01-26 PROCEDURE — ? POST-OP WOUND EVALUATION

## 2024-01-26 ASSESSMENT — LOCATION ZONE DERM: LOCATION ZONE: NOSE

## 2024-01-26 ASSESSMENT — LOCATION SIMPLE DESCRIPTION DERM: LOCATION SIMPLE: RIGHT NOSE

## 2024-01-26 ASSESSMENT — LOCATION DETAILED DESCRIPTION DERM: LOCATION DETAILED: RIGHT NASAL SIDEWALL

## 2024-01-26 NOTE — PROCEDURE: POST-OP WOUND EVALUATION
Detail Level: Detailed
Add 52422 Cpt? (Important Note: In 2017 The Use Of 11032 Is Being Tracked By Cms To Determine Future Global Period Reimbursement For Global Periods): yes
Wound Evaluated By (Optional): Rakesh Weiss MD
Wound Diameter In Cm(Optional): 0
Wound Crusting?: clean
Wound Discharge?: minimal discharge
Sutures?: intact
Wound Edema?: mild
Wound Color?: pink
Any New Or Residual Neoplasm?: No
Patient To Follow-Up With?: our clinic
Follow Up Units (Optional): 1
Follow Up Time Frame (Optional): months

## 2024-02-23 ENCOUNTER — APPOINTMENT (RX ONLY)
Dept: URBAN - METROPOLITAN AREA CLINIC 22 | Facility: CLINIC | Age: 74
Setting detail: DERMATOLOGY
End: 2024-02-23

## 2024-02-23 DIAGNOSIS — Z48.817 ENCOUNTER FOR SURGICAL AFTERCARE FOLLOWING SURGERY ON THE SKIN AND SUBCUTANEOUS TISSUE: ICD-10-CM

## 2024-02-23 PROCEDURE — 99024 POSTOP FOLLOW-UP VISIT: CPT

## 2024-02-23 PROCEDURE — ? POST-OP WOUND EVALUATION

## 2024-02-23 ASSESSMENT — LOCATION SIMPLE DESCRIPTION DERM: LOCATION SIMPLE: RIGHT NOSE

## 2024-02-23 ASSESSMENT — LOCATION DETAILED DESCRIPTION DERM: LOCATION DETAILED: RIGHT NASAL SIDEWALL

## 2024-02-23 ASSESSMENT — LOCATION ZONE DERM: LOCATION ZONE: NOSE

## 2024-04-01 ENCOUNTER — DOCUMENTATION (OUTPATIENT)
Dept: HEALTH INFORMATION MANAGEMENT | Facility: OTHER | Age: 74
End: 2024-04-01
Payer: MEDICARE

## 2024-04-10 ENCOUNTER — APPOINTMENT (RX ONLY)
Dept: URBAN - METROPOLITAN AREA CLINIC 22 | Facility: CLINIC | Age: 74
Setting detail: DERMATOLOGY
End: 2024-04-10

## 2024-04-10 DIAGNOSIS — Z48.817 ENCOUNTER FOR SURGICAL AFTERCARE FOLLOWING SURGERY ON THE SKIN AND SUBCUTANEOUS TISSUE: ICD-10-CM

## 2024-04-10 PROCEDURE — 99024 POSTOP FOLLOW-UP VISIT: CPT

## 2024-04-10 PROCEDURE — ? POST-OP WOUND EVALUATION

## 2024-04-10 ASSESSMENT — LOCATION DETAILED DESCRIPTION DERM: LOCATION DETAILED: RIGHT NASAL SIDEWALL

## 2024-04-10 ASSESSMENT — LOCATION SIMPLE DESCRIPTION DERM: LOCATION SIMPLE: RIGHT NOSE

## 2024-04-10 ASSESSMENT — LOCATION ZONE DERM: LOCATION ZONE: NOSE

## 2024-05-16 ENCOUNTER — APPOINTMENT (RX ONLY)
Dept: URBAN - METROPOLITAN AREA CLINIC 22 | Facility: CLINIC | Age: 74
Setting detail: DERMATOLOGY
End: 2024-05-16

## 2024-05-16 DIAGNOSIS — Z85.828 PERSONAL HISTORY OF OTHER MALIGNANT NEOPLASM OF SKIN: ICD-10-CM

## 2024-05-16 DIAGNOSIS — D22 MELANOCYTIC NEVI: ICD-10-CM

## 2024-05-16 DIAGNOSIS — L57.0 ACTINIC KERATOSIS: ICD-10-CM

## 2024-05-16 DIAGNOSIS — L81.4 OTHER MELANIN HYPERPIGMENTATION: ICD-10-CM

## 2024-05-16 DIAGNOSIS — L82.1 OTHER SEBORRHEIC KERATOSIS: ICD-10-CM

## 2024-05-16 PROBLEM — D22.5 MELANOCYTIC NEVI OF TRUNK: Status: ACTIVE | Noted: 2024-05-16

## 2024-05-16 PROCEDURE — 17003 DESTRUCT PREMALG LES 2-14: CPT

## 2024-05-16 PROCEDURE — ? COUNSELING

## 2024-05-16 PROCEDURE — 99213 OFFICE O/P EST LOW 20 MIN: CPT | Mod: 25

## 2024-05-16 PROCEDURE — ? LIQUID NITROGEN

## 2024-05-16 PROCEDURE — 17000 DESTRUCT PREMALG LESION: CPT

## 2024-05-16 ASSESSMENT — LOCATION DETAILED DESCRIPTION DERM
LOCATION DETAILED: LEFT MEDIAL SUPERIOR EYELID
LOCATION DETAILED: LEFT CENTRAL EYEBROW
LOCATION DETAILED: RIGHT NASAL SIDEWALL
LOCATION DETAILED: RIGHT LATERAL SUPERIOR EYELID
LOCATION DETAILED: LEFT SUPERIOR LATERAL UPPER BACK
LOCATION DETAILED: INFERIOR THORACIC SPINE
LOCATION DETAILED: RIGHT CENTRAL TEMPLE
LOCATION DETAILED: EPIGASTRIC SKIN
LOCATION DETAILED: LEFT SUPERIOR HELIX
LOCATION DETAILED: INFERIOR MID FOREHEAD
LOCATION DETAILED: SUPERIOR THORACIC SPINE
LOCATION DETAILED: RIGHT VENTRAL PROXIMAL FOREARM
LOCATION DETAILED: LEFT VENTRAL PROXIMAL FOREARM

## 2024-05-16 ASSESSMENT — LOCATION ZONE DERM
LOCATION ZONE: EYELID
LOCATION ZONE: ARM
LOCATION ZONE: TRUNK
LOCATION ZONE: FACE
LOCATION ZONE: NOSE
LOCATION ZONE: EAR

## 2024-05-16 ASSESSMENT — LOCATION SIMPLE DESCRIPTION DERM
LOCATION SIMPLE: RIGHT TEMPLE
LOCATION SIMPLE: UPPER BACK
LOCATION SIMPLE: LEFT UPPER BACK
LOCATION SIMPLE: RIGHT FOREARM
LOCATION SIMPLE: LEFT EYEBROW
LOCATION SIMPLE: INFERIOR FOREHEAD
LOCATION SIMPLE: LEFT SUPERIOR EYELID
LOCATION SIMPLE: RIGHT NOSE
LOCATION SIMPLE: RIGHT SUPERIOR EYELID
LOCATION SIMPLE: ABDOMEN
LOCATION SIMPLE: LEFT EAR
LOCATION SIMPLE: LEFT FOREARM

## 2024-05-16 NOTE — PROCEDURE: LIQUID NITROGEN
Show Applicator Variable?: Yes
Post-Care Instructions: I reviewed with the patient in detail post-care instructions. Patient is to wear sunprotection, and avoid picking at any of the treated lesions. Pt may apply Vaseline to crusted or scabbing areas.
Duration Of Freeze Thaw-Cycle (Seconds): 0
Application Tool (Optional): Cry-AC
Number Of Freeze-Thaw Cycles: 2 freeze-thaw cycles
Render Post-Care Instructions In Note?: no
Consent: The patient's consent was obtained including but not limited to risks of crusting, scabbing, blistering, scarring, darker or lighter pigmentary change, recurrence, incomplete removal and infection.
Detail Level: Detailed

## 2024-09-17 ENCOUNTER — OFFICE VISIT (OUTPATIENT)
Dept: MEDICAL GROUP | Facility: PHYSICIAN GROUP | Age: 74
End: 2024-09-17
Payer: MEDICARE

## 2024-09-17 VITALS
BODY MASS INDEX: 27.78 KG/M2 | SYSTOLIC BLOOD PRESSURE: 136 MMHG | HEIGHT: 64 IN | HEART RATE: 80 BPM | OXYGEN SATURATION: 94 % | TEMPERATURE: 98 F | WEIGHT: 162.7 LBS | DIASTOLIC BLOOD PRESSURE: 78 MMHG

## 2024-09-17 DIAGNOSIS — Z00.00 ROUTINE HEALTH MAINTENANCE: ICD-10-CM

## 2024-09-17 DIAGNOSIS — M79.604 ACUTE LEG PAIN, RIGHT: ICD-10-CM

## 2024-09-17 DIAGNOSIS — M25.561 ACUTE PAIN OF RIGHT KNEE: ICD-10-CM

## 2024-09-17 PROCEDURE — 99214 OFFICE O/P EST MOD 30 MIN: CPT | Performed by: NURSE PRACTITIONER

## 2024-09-17 PROCEDURE — 3075F SYST BP GE 130 - 139MM HG: CPT | Performed by: NURSE PRACTITIONER

## 2024-09-17 PROCEDURE — 3078F DIAST BP <80 MM HG: CPT | Performed by: NURSE PRACTITIONER

## 2024-09-17 RX ORDER — MELOXICAM 15 MG/1
15 TABLET ORAL DAILY
Qty: 30 TABLET | Refills: 1 | Status: SHIPPED | OUTPATIENT
Start: 2024-09-17

## 2024-09-17 ASSESSMENT — FIBROSIS 4 INDEX: FIB4 SCORE: 1.17

## 2024-09-19 ENCOUNTER — HOSPITAL ENCOUNTER (OUTPATIENT)
Dept: LAB | Facility: MEDICAL CENTER | Age: 74
End: 2024-09-19
Attending: NURSE PRACTITIONER
Payer: MEDICARE

## 2024-09-19 DIAGNOSIS — E78.2 MIXED HYPERLIPIDEMIA: ICD-10-CM

## 2024-09-19 DIAGNOSIS — M85.89 OSTEOPENIA OF MULTIPLE SITES: ICD-10-CM

## 2024-09-19 DIAGNOSIS — K21.9 GASTROESOPHAGEAL REFLUX DISEASE WITHOUT ESOPHAGITIS: ICD-10-CM

## 2024-09-19 DIAGNOSIS — E55.9 VITAMIN D INSUFFICIENCY: ICD-10-CM

## 2024-09-19 DIAGNOSIS — Z00.00 ROUTINE HEALTH MAINTENANCE: ICD-10-CM

## 2024-09-19 DIAGNOSIS — E66.3 OVERWEIGHT (BMI 25.0-29.9): ICD-10-CM

## 2024-09-19 DIAGNOSIS — B35.1 OM (ONYCHOMYCOSIS): ICD-10-CM

## 2024-09-19 LAB
25(OH)D3 SERPL-MCNC: 42 NG/ML (ref 30–100)
ALBUMIN SERPL BCP-MCNC: 4.9 G/DL (ref 3.2–4.9)
ALBUMIN/GLOB SERPL: 2 G/DL
ALP SERPL-CCNC: 60 U/L (ref 30–99)
ALT SERPL-CCNC: 26 U/L (ref 2–50)
ANION GAP SERPL CALC-SCNC: 12 MMOL/L (ref 7–16)
AST SERPL-CCNC: 18 U/L (ref 12–45)
BASOPHILS # BLD AUTO: 0.3 % (ref 0–1.8)
BASOPHILS # BLD: 0.02 K/UL (ref 0–0.12)
BILIRUB SERPL-MCNC: 0.7 MG/DL (ref 0.1–1.5)
BUN SERPL-MCNC: 19 MG/DL (ref 8–22)
CALCIUM ALBUM COR SERPL-MCNC: 8.9 MG/DL (ref 8.5–10.5)
CALCIUM SERPL-MCNC: 9.6 MG/DL (ref 8.5–10.5)
CHLORIDE SERPL-SCNC: 104 MMOL/L (ref 96–112)
CHOLEST SERPL-MCNC: 138 MG/DL (ref 100–199)
CO2 SERPL-SCNC: 26 MMOL/L (ref 20–33)
CREAT SERPL-MCNC: 0.53 MG/DL (ref 0.5–1.4)
EOSINOPHIL # BLD AUTO: 0.27 K/UL (ref 0–0.51)
EOSINOPHIL NFR BLD: 4.3 % (ref 0–6.9)
ERYTHROCYTE [DISTWIDTH] IN BLOOD BY AUTOMATED COUNT: 40.8 FL (ref 35.9–50)
FASTING STATUS PATIENT QL REPORTED: NORMAL
GFR SERPLBLD CREATININE-BSD FMLA CKD-EPI: 98 ML/MIN/1.73 M 2
GLOBULIN SER CALC-MCNC: 2.4 G/DL (ref 1.9–3.5)
GLUCOSE SERPL-MCNC: 99 MG/DL (ref 65–99)
HCT VFR BLD AUTO: 40.9 % (ref 37–47)
HDLC SERPL-MCNC: 51 MG/DL
HGB BLD-MCNC: 13.7 G/DL (ref 12–16)
IMM GRANULOCYTES # BLD AUTO: 0 K/UL (ref 0–0.11)
IMM GRANULOCYTES NFR BLD AUTO: 0 % (ref 0–0.9)
LDLC SERPL CALC-MCNC: 70 MG/DL
LYMPHOCYTES # BLD AUTO: 3.04 K/UL (ref 1–4.8)
LYMPHOCYTES NFR BLD: 48.9 % (ref 22–41)
MCH RBC QN AUTO: 30.3 PG (ref 27–33)
MCHC RBC AUTO-ENTMCNC: 33.5 G/DL (ref 32.2–35.5)
MCV RBC AUTO: 90.5 FL (ref 81.4–97.8)
MONOCYTES # BLD AUTO: 0.46 K/UL (ref 0–0.85)
MONOCYTES NFR BLD AUTO: 7.4 % (ref 0–13.4)
NEUTROPHILS # BLD AUTO: 2.43 K/UL (ref 1.82–7.42)
NEUTROPHILS NFR BLD: 39.1 % (ref 44–72)
NRBC # BLD AUTO: 0 K/UL
NRBC BLD-RTO: 0 /100 WBC (ref 0–0.2)
PLATELET # BLD AUTO: 289 K/UL (ref 164–446)
PMV BLD AUTO: 10.4 FL (ref 9–12.9)
POTASSIUM SERPL-SCNC: 3.8 MMOL/L (ref 3.6–5.5)
PROT SERPL-MCNC: 7.3 G/DL (ref 6–8.2)
RBC # BLD AUTO: 4.52 M/UL (ref 4.2–5.4)
SODIUM SERPL-SCNC: 142 MMOL/L (ref 135–145)
TRIGL SERPL-MCNC: 85 MG/DL (ref 0–149)
TSH SERPL DL<=0.005 MIU/L-ACNC: 2.23 UIU/ML (ref 0.38–5.33)
WBC # BLD AUTO: 6.2 K/UL (ref 4.8–10.8)

## 2024-09-19 PROCEDURE — 85025 COMPLETE CBC W/AUTO DIFF WBC: CPT

## 2024-09-19 PROCEDURE — 84443 ASSAY THYROID STIM HORMONE: CPT

## 2024-09-19 PROCEDURE — 36415 COLL VENOUS BLD VENIPUNCTURE: CPT

## 2024-09-19 PROCEDURE — 82306 VITAMIN D 25 HYDROXY: CPT

## 2024-09-19 PROCEDURE — 80061 LIPID PANEL: CPT

## 2024-09-19 PROCEDURE — 80053 COMPREHEN METABOLIC PANEL: CPT

## 2024-09-19 NOTE — PROGRESS NOTES
Verbal consent was acquired by the patient to use [employer] ambient listening note generation during this visit Yes      Subjective   Shannan Dove is a 73 y.o. female who presents for:  History of Present Illness  The patient presents for evaluation of right knee and leg pain.    She has been experiencing intermittent leg pain, which she attributes to her inconsistent use of the lymphedema machine. The pain intensified during a soccer game, leading her to elevate her leg on a chair with a coat underneath. This provided some relief, but the pain returned suddenly on Sunday night, particularly when she attempted to move her knee. She describes the pain as severe, located at the back of her knee and kneecap, and accompanied by numbness down her shin. Attempts to alleviate the pain with ice and water on Giovanni night were unsuccessful, but a hot shower on Monday seemed to help. However, the pain and stiffness returned later in the day, and she also began to experience hip pain. The severity of the knee pain has forced her to keep her leg straight, even when using the toilet. She has been sleeping in a lazy boy chair for the past two nights due to the discomfort. Despite these challenges, she was able to walk around the house and go upstairs today, albeit with a stiff leg. She reports no recent falls or illnesses. She has been taking Tylenol Arthritis and ibuprofen twice daily, and has also tried Voltaren gel and Salonpas patches, but these have not provided significant relief. She used to take meloxicam 7 or 8 years ago, which she found helpful. She describes the pain as a constant dull ache, and notes that it is worse than a previous episode of knee pain she experienced a year ago, which was treated with physical therapy. She has also tried elevating her leg, but this did not provide relief.    She is going to get the influenza vaccine in 09/2024 and COVID-19 vaccine in 10/2024. She has a blood test on Thursday.  "She had a cancer spot taken off when she saw me in 03/2024.    FAMILY HISTORY  Her mother had colon cancer at 89 and her brother had colon cancer 6 months later.    Review of Systems   Musculoskeletal:  Positive for arthralgias.        Knee pain   All other systems reviewed and are negative.    Objective   /78 (BP Location: Left arm, Patient Position: Sitting, BP Cuff Size: Adult)   Pulse 80   Temp 36.7 °C (98 °F) (Temporal)   Ht 1.626 m (5' 4\")   Wt 73.8 kg (162 lb 11.2 oz)   SpO2 94%   Physical Exam  General: Well nourished, well developed female in NAD, awake and conversant.  Eyes: Normal conjunctiva, anicteric.  Round symmetrical pupils.  ENT: Hearing grossly intact.  No nasal discharge.  Neck: Neck is supple.  No masses or thyromegaly.  CV: No lower extremity edema.  Respiratory: Respirations are nonlabored.  No wheezing.  Abdomen: Non-Distended.  Skin: Warm.  No rashes or ulcers.  MSK: Antalgic gait.  No clubbing or cyanosis.  Neuro: Sensation and CN II-XII grossly normal.  Psych: Alert and oriented.  Cooperative, appropriate mood and affect, normal judgment.      Assessment & Plan  1. Acute leg pain, right  New to examiner, uncontrolled for the patient. The hip pain may be related to the altered gait and stiffness from the knee pain. Salonpas patches can be used on the hip as well. She is advised to keep moving and avoid prolonged periods of immobility.  - diclofenac sodium (VOLTAREN) 1 % Gel; Apply 4 g topically 4 times a day as needed (knee pain).  Dispense: 350 g; Refill: 0  - meloxicam (MOBIC) 15 MG tablet; Take 1 Tablet by mouth every day.  Dispense: 30 Tablet; Refill: 1    2. Acute pain of right knee  New to examiner, uncontrolled for the patient. The knee pain is likely due to arthritis, as indicated by the patient's history and symptoms. She reports a constant dull pain, exacerbated by movement and relieved somewhat by heat. There is no significant swelling compared to the other leg. A " prescription for Voltaren gel (diclofenac) will be sent to [facility]. Meloxicam 15 mg once daily is prescribed, and she is advised to discontinue ibuprofen. She can continue taking Tylenol for pain relief. Alternating ice and heat therapy every 15 minutes is recommended, with an emphasis on using heat if it provides more relief. Regular use of the lymphedema machine is encouraged. Salonpas patches can be used on both the hip and knee. She is advised to maintain mobility and avoid sitting with a stiff leg.  - diclofenac sodium (VOLTAREN) 1 % Gel; Apply 4 g topically 4 times a day as needed (knee pain).  Dispense: 350 g; Refill: 0  - meloxicam (MOBIC) 15 MG tablet; Take 1 Tablet by mouth every day.  Dispense: 30 Tablet; Refill: 1    3. Routine health maintenance   She plans to get a flu shot in September and a COVID-19 vaccine in October. She will update her annual wellness in October. A mammogram is scheduled for November 2025. A colonoscopy is scheduled for 2026. Bone density screening is also planned.    Return if symptoms worsen or fail to improve.     Please note that this dictation was created using voice recognition software. I have made every reasonable attempt to correct obvious errors, but I expect that there are errors of grammar and possibly content that I did not discover before finalizing the note.

## 2024-09-23 ASSESSMENT — ENCOUNTER SYMPTOMS: ARTHRALGIAS: 1

## 2024-10-01 SDOH — HEALTH STABILITY: PHYSICAL HEALTH: ON AVERAGE, HOW MANY MINUTES DO YOU ENGAGE IN EXERCISE AT THIS LEVEL?: 0 MIN

## 2024-10-01 SDOH — ECONOMIC STABILITY: INCOME INSECURITY: HOW HARD IS IT FOR YOU TO PAY FOR THE VERY BASICS LIKE FOOD, HOUSING, MEDICAL CARE, AND HEATING?: NOT HARD AT ALL

## 2024-10-01 SDOH — HEALTH STABILITY: MENTAL HEALTH
STRESS IS WHEN SOMEONE FEELS TENSE, NERVOUS, ANXIOUS, OR CAN'T SLEEP AT NIGHT BECAUSE THEIR MIND IS TROUBLED. HOW STRESSED ARE YOU?: ONLY A LITTLE

## 2024-10-01 SDOH — ECONOMIC STABILITY: FOOD INSECURITY: WITHIN THE PAST 12 MONTHS, YOU WORRIED THAT YOUR FOOD WOULD RUN OUT BEFORE YOU GOT MONEY TO BUY MORE.: NEVER TRUE

## 2024-10-01 SDOH — ECONOMIC STABILITY: INCOME INSECURITY: IN THE LAST 12 MONTHS, WAS THERE A TIME WHEN YOU WERE NOT ABLE TO PAY THE MORTGAGE OR RENT ON TIME?: NO

## 2024-10-01 SDOH — ECONOMIC STABILITY: FOOD INSECURITY: WITHIN THE PAST 12 MONTHS, THE FOOD YOU BOUGHT JUST DIDN'T LAST AND YOU DIDN'T HAVE MONEY TO GET MORE.: NEVER TRUE

## 2024-10-01 SDOH — HEALTH STABILITY: PHYSICAL HEALTH: ON AVERAGE, HOW MANY DAYS PER WEEK DO YOU ENGAGE IN MODERATE TO STRENUOUS EXERCISE (LIKE A BRISK WALK)?: 0 DAYS

## 2024-10-01 ASSESSMENT — SOCIAL DETERMINANTS OF HEALTH (SDOH)
HOW OFTEN DO YOU GET TOGETHER WITH FRIENDS OR RELATIVES?: ONCE A WEEK
HOW OFTEN DO YOU HAVE SIX OR MORE DRINKS ON ONE OCCASION: NEVER
HOW OFTEN DO YOU ATTENT MEETINGS OF THE CLUB OR ORGANIZATION YOU BELONG TO?: PATIENT DECLINED
HOW OFTEN DO YOU ATTEND CHURCH OR RELIGIOUS SERVICES?: NEVER
WITHIN THE PAST 12 MONTHS, YOU WORRIED THAT YOUR FOOD WOULD RUN OUT BEFORE YOU GOT THE MONEY TO BUY MORE: NEVER TRUE
HOW OFTEN DO YOU GET TOGETHER WITH FRIENDS OR RELATIVES?: ONCE A WEEK
IN A TYPICAL WEEK, HOW MANY TIMES DO YOU TALK ON THE PHONE WITH FAMILY, FRIENDS, OR NEIGHBORS?: ONCE A WEEK
HOW MANY DRINKS CONTAINING ALCOHOL DO YOU HAVE ON A TYPICAL DAY WHEN YOU ARE DRINKING: PATIENT DOES NOT DRINK
IN THE PAST 12 MONTHS, HAS THE ELECTRIC, GAS, OIL, OR WATER COMPANY THREATENED TO SHUT OFF SERVICE IN YOUR HOME?: NO
HOW HARD IS IT FOR YOU TO PAY FOR THE VERY BASICS LIKE FOOD, HOUSING, MEDICAL CARE, AND HEATING?: NOT HARD AT ALL
DO YOU BELONG TO ANY CLUBS OR ORGANIZATIONS SUCH AS CHURCH GROUPS UNIONS, FRATERNAL OR ATHLETIC GROUPS, OR SCHOOL GROUPS?: NO
HOW OFTEN DO YOU ATTENT MEETINGS OF THE CLUB OR ORGANIZATION YOU BELONG TO?: PATIENT DECLINED
HOW OFTEN DO YOU HAVE A DRINK CONTAINING ALCOHOL: NEVER
DO YOU BELONG TO ANY CLUBS OR ORGANIZATIONS SUCH AS CHURCH GROUPS UNIONS, FRATERNAL OR ATHLETIC GROUPS, OR SCHOOL GROUPS?: NO
HOW OFTEN DO YOU ATTEND CHURCH OR RELIGIOUS SERVICES?: NEVER
IN A TYPICAL WEEK, HOW MANY TIMES DO YOU TALK ON THE PHONE WITH FAMILY, FRIENDS, OR NEIGHBORS?: ONCE A WEEK

## 2024-10-01 ASSESSMENT — LIFESTYLE VARIABLES
AUDIT-C TOTAL SCORE: 0
HOW OFTEN DO YOU HAVE SIX OR MORE DRINKS ON ONE OCCASION: NEVER
HOW OFTEN DO YOU HAVE A DRINK CONTAINING ALCOHOL: NEVER
SKIP TO QUESTIONS 9-10: 1
HOW MANY STANDARD DRINKS CONTAINING ALCOHOL DO YOU HAVE ON A TYPICAL DAY: PATIENT DOES NOT DRINK

## 2024-10-03 ENCOUNTER — OFFICE VISIT (OUTPATIENT)
Dept: MEDICAL GROUP | Facility: PHYSICIAN GROUP | Age: 74
End: 2024-10-03
Payer: MEDICARE

## 2024-10-03 ENCOUNTER — RESEARCH ENCOUNTER (OUTPATIENT)
Dept: RESEARCH | Facility: MEDICAL CENTER | Age: 74
End: 2024-10-03

## 2024-10-03 VITALS
DIASTOLIC BLOOD PRESSURE: 72 MMHG | WEIGHT: 162 LBS | HEART RATE: 70 BPM | TEMPERATURE: 98.9 F | OXYGEN SATURATION: 96 % | SYSTOLIC BLOOD PRESSURE: 122 MMHG | HEIGHT: 64 IN | BODY MASS INDEX: 27.66 KG/M2

## 2024-10-03 DIAGNOSIS — Z00.00 MEDICARE ANNUAL WELLNESS VISIT, SUBSEQUENT: ICD-10-CM

## 2024-10-03 DIAGNOSIS — M81.0 AGE-RELATED OSTEOPOROSIS WITHOUT CURRENT PATHOLOGICAL FRACTURE: ICD-10-CM

## 2024-10-03 DIAGNOSIS — K21.9 GASTROESOPHAGEAL REFLUX DISEASE WITHOUT ESOPHAGITIS: ICD-10-CM

## 2024-10-03 DIAGNOSIS — C44.1121 BASAL CELL CARCINOMA (BCC) OF SKIN OF RIGHT UPPER EYELID INCLUDING CANTHUS: ICD-10-CM

## 2024-10-03 DIAGNOSIS — E78.2 MIXED HYPERLIPIDEMIA: ICD-10-CM

## 2024-10-03 DIAGNOSIS — E55.9 VITAMIN D INSUFFICIENCY: ICD-10-CM

## 2024-10-03 PROBLEM — L03.031 PARONYCHIA OF GREAT TOE, RIGHT: Status: RESOLVED | Noted: 2021-08-17 | Resolved: 2024-10-03

## 2024-10-03 PROCEDURE — 3074F SYST BP LT 130 MM HG: CPT | Performed by: NURSE PRACTITIONER

## 2024-10-03 PROCEDURE — 3078F DIAST BP <80 MM HG: CPT | Performed by: NURSE PRACTITIONER

## 2024-10-03 PROCEDURE — G0439 PPPS, SUBSEQ VISIT: HCPCS | Performed by: NURSE PRACTITIONER

## 2024-10-03 ASSESSMENT — PATIENT HEALTH QUESTIONNAIRE - PHQ9: CLINICAL INTERPRETATION OF PHQ2 SCORE: 0

## 2024-10-03 ASSESSMENT — FIBROSIS 4 INDEX: FIB4 SCORE: 0.88

## 2024-10-03 ASSESSMENT — ACTIVITIES OF DAILY LIVING (ADL): BATHING_REQUIRES_ASSISTANCE: 0

## 2024-10-03 ASSESSMENT — ENCOUNTER SYMPTOMS: GENERAL WELL-BEING: GOOD

## 2024-10-28 DIAGNOSIS — Z00.6 CLINICAL TRIAL PARTICIPANT: ICD-10-CM

## 2024-10-28 NOTE — PROCEDURE: POST-OP WOUND EVALUATION
Detail Level: Detailed
Add 19701 Cpt? (Important Note: In 2017 The Use Of 70740 Is Being Tracked By Cms To Determine Future Global Period Reimbursement For Global Periods): yes
Quality 355: Unplanned Reoperation Within The 30 Day Postoperative Period: No return to the operating room for a surgical procedure, for complications of the principal operative procedure, within 30 days of the principal operative procedure
Quality 357: Surgical Site Infection (Ssi): No surgical site infection
Wound Evaluated By (Optional): Rakesh Weiss MD
Wound Diameter In Cm(Optional): 0
Wound Crusting?: clean
Wound Edema?: mild
Wound Color?: pink
Any New Or Residual Neoplasm?: No
124

## 2024-11-12 ENCOUNTER — HOSPITAL ENCOUNTER (OUTPATIENT)
Dept: LAB | Facility: MEDICAL CENTER | Age: 74
End: 2024-11-12
Attending: FAMILY MEDICINE
Payer: MEDICARE

## 2024-11-12 DIAGNOSIS — Z00.6 CLINICAL TRIAL PARTICIPANT: ICD-10-CM

## 2024-11-14 ENCOUNTER — APPOINTMENT (RX ONLY)
Dept: URBAN - METROPOLITAN AREA CLINIC 22 | Facility: CLINIC | Age: 74
Setting detail: DERMATOLOGY
End: 2024-11-14

## 2024-11-14 DIAGNOSIS — L57.0 ACTINIC KERATOSIS: ICD-10-CM

## 2024-11-14 DIAGNOSIS — L82.1 OTHER SEBORRHEIC KERATOSIS: ICD-10-CM

## 2024-11-14 DIAGNOSIS — Z85.828 PERSONAL HISTORY OF OTHER MALIGNANT NEOPLASM OF SKIN: ICD-10-CM

## 2024-11-14 DIAGNOSIS — L81.4 OTHER MELANIN HYPERPIGMENTATION: ICD-10-CM

## 2024-11-14 DIAGNOSIS — D22 MELANOCYTIC NEVI: ICD-10-CM

## 2024-11-14 PROBLEM — D22.5 MELANOCYTIC NEVI OF TRUNK: Status: ACTIVE | Noted: 2024-11-14

## 2024-11-14 PROCEDURE — 99213 OFFICE O/P EST LOW 20 MIN: CPT | Mod: 25

## 2024-11-14 PROCEDURE — ? COUNSELING

## 2024-11-14 PROCEDURE — 17003 DESTRUCT PREMALG LES 2-14: CPT

## 2024-11-14 PROCEDURE — 17000 DESTRUCT PREMALG LESION: CPT

## 2024-11-14 PROCEDURE — ? LIQUID NITROGEN

## 2024-11-14 ASSESSMENT — LOCATION ZONE DERM
LOCATION ZONE: NOSE
LOCATION ZONE: EYELID
LOCATION ZONE: FACE
LOCATION ZONE: ARM
LOCATION ZONE: TRUNK

## 2024-11-14 ASSESSMENT — LOCATION DETAILED DESCRIPTION DERM
LOCATION DETAILED: LEFT SUPERIOR LATERAL UPPER BACK
LOCATION DETAILED: LEFT INFERIOR NASAL CHEEK
LOCATION DETAILED: RIGHT VENTRAL PROXIMAL FOREARM
LOCATION DETAILED: RIGHT NASAL SIDEWALL
LOCATION DETAILED: INFERIOR THORACIC SPINE
LOCATION DETAILED: EPIGASTRIC SKIN
LOCATION DETAILED: LEFT VENTRAL PROXIMAL FOREARM
LOCATION DETAILED: INFERIOR MID FOREHEAD
LOCATION DETAILED: SUPERIOR THORACIC SPINE
LOCATION DETAILED: RIGHT LATERAL SUPERIOR EYELID
LOCATION DETAILED: NASAL DORSUM
LOCATION DETAILED: LEFT MEDIAL SUPERIOR EYELID

## 2024-11-14 ASSESSMENT — LOCATION SIMPLE DESCRIPTION DERM
LOCATION SIMPLE: INFERIOR FOREHEAD
LOCATION SIMPLE: RIGHT NOSE
LOCATION SIMPLE: LEFT SUPERIOR EYELID
LOCATION SIMPLE: NOSE
LOCATION SIMPLE: RIGHT SUPERIOR EYELID
LOCATION SIMPLE: LEFT CHEEK
LOCATION SIMPLE: UPPER BACK
LOCATION SIMPLE: LEFT FOREARM
LOCATION SIMPLE: ABDOMEN
LOCATION SIMPLE: LEFT UPPER BACK
LOCATION SIMPLE: RIGHT FOREARM

## 2024-11-18 LAB
ELF SCORE: 9.13 -
HA (HYALURONIC ACID): 52.07 NG/ML
PIIINP (AMINO-TERMINAL PROPEPTIDE): 7.08 NG/ML
RELATIVE RISK: NORMAL
RISK GROUP: NORMAL
RISK: 3.3 %
TIMP-1 (TISSUE INHIBITOR OF MMP1): 162.9 NG/ML

## 2024-12-03 LAB
APOB+LDLR+PCSK9 GENE MUT ANL BLD/T: NOT DETECTED
BRCA1+BRCA2 DEL+DUP + FULL MUT ANL BLD/T: NOT DETECTED
MLH1+MSH2+MSH6+PMS2 GN DEL+DUP+FUL M: NOT DETECTED

## 2024-12-10 DIAGNOSIS — M79.604 ACUTE LEG PAIN, RIGHT: ICD-10-CM

## 2024-12-10 DIAGNOSIS — M25.561 ACUTE PAIN OF RIGHT KNEE: ICD-10-CM

## 2024-12-10 NOTE — TELEPHONE ENCOUNTER
Received request via: Pharmacy    Was the patient seen in the last year in this department? Yes    Does the patient have an active prescription (recently filled or refills available) for medication(s) requested? No    [facility]    Does the patient have [facility] Plus and need 100-day supply? (This applies to ALL medications) Yes, quantity updated to 100 days

## 2024-12-11 RX ORDER — MELOXICAM 15 MG/1
15 TABLET ORAL DAILY
Qty: 100 TABLET | Refills: 3 | Status: SHIPPED | OUTPATIENT
Start: 2024-12-11

## 2024-12-12 NOTE — TELEPHONE ENCOUNTER
Requested Prescriptions     Pending Prescriptions Disp Refills    meloxicam (MOBIC) 15 MG tablet [Pharmacy Med Name: MELOXICAM 15MG TABLETS] 100 Tablet 3     Sig: TAKE 1 TABLET BY MOUTH EVERY DAY       MAYCOL Gomez.

## 2024-12-16 ENCOUNTER — PATIENT MESSAGE (OUTPATIENT)
Dept: INTERNAL MEDICINE | Facility: IMAGING CENTER | Age: 74
End: 2024-12-16
Payer: MEDICARE

## 2024-12-16 DIAGNOSIS — E78.2 MIXED HYPERLIPIDEMIA: ICD-10-CM

## 2024-12-16 PROCEDURE — RXMED WILLOW AMBULATORY MEDICATION CHARGE: Performed by: FAMILY MEDICINE

## 2024-12-16 RX ORDER — ATORVASTATIN CALCIUM 20 MG/1
20 TABLET, FILM COATED ORAL EVERY EVENING
Qty: 100 TABLET | Refills: 0 | Status: SHIPPED | OUTPATIENT
Start: 2024-12-16

## 2024-12-18 ENCOUNTER — PHARMACY VISIT (OUTPATIENT)
Dept: PHARMACY | Facility: MEDICAL CENTER | Age: 74
End: 2024-12-18
Payer: COMMERCIAL

## 2025-02-14 ENCOUNTER — APPOINTMENT (OUTPATIENT)
Dept: URGENT CARE | Facility: PHYSICIAN GROUP | Age: 75
End: 2025-02-14
Payer: MEDICARE

## 2025-02-14 ENCOUNTER — OFFICE VISIT (OUTPATIENT)
Dept: URGENT CARE | Facility: PHYSICIAN GROUP | Age: 75
End: 2025-02-14
Payer: MEDICARE

## 2025-02-14 VITALS
TEMPERATURE: 98.8 F | RESPIRATION RATE: 18 BRPM | HEART RATE: 104 BPM | BODY MASS INDEX: 27.95 KG/M2 | HEIGHT: 66 IN | SYSTOLIC BLOOD PRESSURE: 140 MMHG | OXYGEN SATURATION: 93 % | DIASTOLIC BLOOD PRESSURE: 68 MMHG | WEIGHT: 173.9 LBS

## 2025-02-14 DIAGNOSIS — U07.1 COVID-19: ICD-10-CM

## 2025-02-14 DIAGNOSIS — R05.8 COUGH WITH EXPOSURE TO SEVERE ACUTE RESPIRATORY SYNDROME CORONAVIRUS 2 (SARS-COV-2): ICD-10-CM

## 2025-02-14 DIAGNOSIS — Z20.822 COUGH WITH EXPOSURE TO SEVERE ACUTE RESPIRATORY SYNDROME CORONAVIRUS 2 (SARS-COV-2): ICD-10-CM

## 2025-02-14 DIAGNOSIS — J02.9 PHARYNGITIS, UNSPECIFIED ETIOLOGY: ICD-10-CM

## 2025-02-14 LAB
FLUAV RNA SPEC QL NAA+PROBE: NEGATIVE
FLUBV RNA SPEC QL NAA+PROBE: NEGATIVE
RSV RNA SPEC QL NAA+PROBE: NEGATIVE
SARS-COV-2 RNA RESP QL NAA+PROBE: POSITIVE

## 2025-02-14 PROCEDURE — 3077F SYST BP >= 140 MM HG: CPT | Performed by: STUDENT IN AN ORGANIZED HEALTH CARE EDUCATION/TRAINING PROGRAM

## 2025-02-14 PROCEDURE — 3078F DIAST BP <80 MM HG: CPT | Performed by: STUDENT IN AN ORGANIZED HEALTH CARE EDUCATION/TRAINING PROGRAM

## 2025-02-14 PROCEDURE — 99214 OFFICE O/P EST MOD 30 MIN: CPT | Performed by: STUDENT IN AN ORGANIZED HEALTH CARE EDUCATION/TRAINING PROGRAM

## 2025-02-14 PROCEDURE — 0241U POCT CEPHEID COV-2, FLU A/B, RSV - PCR: CPT | Performed by: STUDENT IN AN ORGANIZED HEALTH CARE EDUCATION/TRAINING PROGRAM

## 2025-02-14 ASSESSMENT — ENCOUNTER SYMPTOMS
CHILLS: 1
SORE THROAT: 1
COUGH: 0
SHORTNESS OF BREATH: 0
FEVER: 1

## 2025-02-14 ASSESSMENT — FIBROSIS 4 INDEX: FIB4 SCORE: 0.89

## 2025-02-14 NOTE — PROGRESS NOTES
Subjective:   Shannan Dove is a 74 y.o. female who presents for Fever (High fever, bilateral ears clogged,  runny nose, painful to swallow. Pt exposed to Covid. All sx started yesterday.)      HPI:  74-year-old female presents with fever feeling ear pressure runny nose sore throat.   tested positive for COVID yesterday.  Patient's symptoms started yesterday afternoon.  Mild cough no shortness of breath no chest pain.  Patient does take atorvastatin.  - No other sick contacts  - No significant ear pain or sinus pain    Reports being up-to-date on all vaccines including COVID.  - Denies any history of lung pathologies.    Review of Systems   Constitutional:  Positive for chills, fever and malaise/fatigue.   HENT:  Positive for congestion and sore throat.    Respiratory:  Negative for cough and shortness of breath.        Medications:    atorvastatin Tabs  Calcium Carbonate Tabs  CO Q 10 PO  diclofenac sodium Gel  famotidine Tabs  meloxicam  OMEGA 3 500 PO  triamcinolone acetonide Crea  Vitamin D3 Tabs    Allergies: Pcn [penicillins]    Problem List: Shannan Dove does not have any pertinent problems on file.    Surgical History:  Past Surgical History:   Procedure Laterality Date    GYN SURGERY  10/25/2022    Endometrial biopsy    TONSILLECTOMY         Past Social Hx: Shannan Dove  reports that she has never smoked. She has never been exposed to tobacco smoke. She has never used smokeless tobacco. She reports that she does not drink alcohol and does not use drugs.     Past Family Hx:  Shannan Dove family history includes Alcohol abuse in her brother; Alcohol/Drug in her brother, maternal grandfather, and maternal uncle; Asthma in her maternal grandmother; Autism in her brother; Cancer in her brother, brother, and mother; Drug abuse in her brother; Heart Disease in her maternal aunt, maternal aunt, maternal grandfather, maternal grandmother, mother, and paternal grandmother; Hypertension in  "her brother and maternal aunt; No Known Problems in her paternal grandfather; Psychiatric Illness in her father; Stroke in her brother.     Problem list, medications, and allergies reviewed by myself today in Epic.     Objective:     BP (!) 140/68 (BP Location: Right arm, Patient Position: Sitting, BP Cuff Size: Adult)   Pulse (!) 104   Temp 37.1 °C (98.8 °F)   Resp 18   Ht 1.67 m (5' 5.75\")   Wt 78.9 kg (173 lb 14.4 oz)   LMP 01/01/2006 (Within Years)   SpO2 93%   BMI 28.28 kg/m²     Physical Exam  Constitutional:       Appearance: Normal appearance.   HENT:      Head: Normocephalic and atraumatic.      Right Ear: Tympanic membrane normal.      Left Ear: Tympanic membrane normal.      Nose: Nose normal. No congestion or rhinorrhea.      Mouth/Throat:      Mouth: Mucous membranes are moist.      Pharynx: Oropharynx is clear. Posterior oropharyngeal erythema present. No oropharyngeal exudate.   Eyes:      Extraocular Movements: Extraocular movements intact.      Conjunctiva/sclera: Conjunctivae normal.   Cardiovascular:      Rate and Rhythm: Normal rate and regular rhythm.      Pulses: Normal pulses.      Heart sounds: Normal heart sounds.   Pulmonary:      Effort: Pulmonary effort is normal. No respiratory distress.      Breath sounds: No wheezing.   Neurological:      Mental Status: She is alert.         Assessment/Plan:     Diagnosis and associated orders:     1. Pharyngitis, unspecified etiology  POCT CoV-2, Flu A/B, RSV by PCR      2. Cough with exposure to severe acute respiratory syndrome coronavirus 2 (SARS-CoV-2)  POCT CoV-2, Flu A/B, RSV by PCR         Comments/MDM:     1. Pharyngitis, unspecified etiology  Supportive care to manage pharyngitis discussed with patient  Salt water gargles BID and prn. Suggested 1/4 to 1/2 teaspoon (1.5 to 3.0 g) of salt per one cup (8 ounces or 250 mL) of warm water.   Use of honey and warm water and/or tea helps alleviate feelings of discomfort and pain as well.  OTC " throat analgesic spray or lozenge of choice prn throat pain. Dosage and directions per   OTC  analgesic of choice (acetaminophen or NSAID) prn pain. Follow manufactures dosing and safety precautions.     Return precautions discussed including significant worsening of current symptoms no improvement with supportive care or treatment, difficulty breathing or difficulty swallowing.    - POCT CoV-2, Flu A/B, RSV by PCR    2. Cough with exposure to severe acute respiratory syndrome coronavirus 2 (SARS-CoV-2)  COVID-positive.  Will treat with Paxlovid.  Patient instructed to stop her atorvastatin for the duration of her Paxlovid treatment and restart a few days afterwards.    Symptomatic Management     - Pain relief: Recommend acetaminophen or ibuprofen for fever and aches.      - Nasal congestion:      - Nasal saline irrigation or nasal spray (e.g., saline spray) as needed.     - If congestion is significant, consider pseudoephedrine 30 mg every 4-6 hours or nasal steroid spray such as Flonase     -  Cough relief: Continue cough suppressant (e.g., dextromethorphan) and/or an expectorant (e.g., guaifenesin) if productive.     -  Hydration: Advise increased fluid intake to help thin secretions and prevent dehydration.     - Throat comfort: Suggest throat lozenges, warm saltwater gargles, or warm teas with honey for soothing the throat.    Infection Control:     - Viral URIs are highly contagious, especially in the first few days. Recommend avoiding close contact with others, including staying home from work or school until fever resolves and they feel better (typically after 3-5 days).    Follow-up:     - Symptom duration: Viral URIs typically resolve within 7-10 days. If symptoms worsen or persist longer than 10-14 days, please return for additional evaluation     - Follow-up sooner if you develop new or worsening symptoms such as severe sore throat, high fever, shortness of breath, or worsening cough.    -  POCT CoV-2, Flu A/B, RSV by PCR           Differential diagnosis, natural history, supportive care, and indications for immediate follow-up discussed.    Advised the patient to follow-up with the primary care physician for recheck, reevaluation, and consideration of further management.    Please note that this dictation was created using voice recognition software. I have made a reasonable attempt to correct obvious errors, but I expect that there are errors of grammar and possibly content that I did not discover before finalizing the note.    Handy Gilbert M.D.

## 2025-03-11 ENCOUNTER — OFFICE VISIT (OUTPATIENT)
Dept: URGENT CARE | Facility: PHYSICIAN GROUP | Age: 75
End: 2025-03-11
Payer: MEDICARE

## 2025-03-11 VITALS
HEART RATE: 98 BPM | TEMPERATURE: 98.4 F | HEIGHT: 64 IN | OXYGEN SATURATION: 97 % | RESPIRATION RATE: 18 BRPM | BODY MASS INDEX: 29.51 KG/M2 | SYSTOLIC BLOOD PRESSURE: 150 MMHG | DIASTOLIC BLOOD PRESSURE: 82 MMHG | WEIGHT: 172.84 LBS

## 2025-03-11 DIAGNOSIS — R03.0 ELEVATED BLOOD PRESSURE READING: ICD-10-CM

## 2025-03-11 DIAGNOSIS — R10.32 LEFT LOWER QUADRANT ABDOMINAL PAIN: ICD-10-CM

## 2025-03-11 LAB
APPEARANCE UR: NORMAL
BILIRUB UR STRIP-MCNC: NEGATIVE MG/DL
COLOR UR AUTO: NORMAL
GLUCOSE UR STRIP.AUTO-MCNC: NEGATIVE MG/DL
KETONES UR STRIP.AUTO-MCNC: NORMAL MG/DL
LEUKOCYTE ESTERASE UR QL STRIP.AUTO: NEGATIVE
NITRITE UR QL STRIP.AUTO: NEGATIVE
PH UR STRIP.AUTO: 6.5 [PH] (ref 5–8)
PROT UR QL STRIP: NEGATIVE MG/DL
RBC UR QL AUTO: NORMAL
SP GR UR STRIP.AUTO: 1.02
UROBILINOGEN UR STRIP-MCNC: NORMAL MG/DL

## 2025-03-11 PROCEDURE — 81002 URINALYSIS NONAUTO W/O SCOPE: CPT | Performed by: FAMILY MEDICINE

## 2025-03-11 PROCEDURE — 3079F DIAST BP 80-89 MM HG: CPT | Performed by: FAMILY MEDICINE

## 2025-03-11 PROCEDURE — 3077F SYST BP >= 140 MM HG: CPT | Performed by: FAMILY MEDICINE

## 2025-03-11 PROCEDURE — 99214 OFFICE O/P EST MOD 30 MIN: CPT | Performed by: FAMILY MEDICINE

## 2025-03-11 ASSESSMENT — FIBROSIS 4 INDEX: FIB4 SCORE: 0.89

## 2025-03-12 NOTE — PROGRESS NOTES
"  Subjective:      74 y.o. female presents to urgent care for LLQ abdominal pain that started this afternoon. There was no inciting event or trauma at that time.  Pain was constant and was described as feeling dull.  After 3 hours the pain spontaneously resolved.  She had some associated nausea with 1 episode of vomiting.  Last bowel movement was yesterday and was diarrhea without any blood.  No changes to urinary urgency, frequency, dysuria, or hematuria.  No recent travel, antibiotic use, change in diet, or exposure to exotic animals.  No prior history of abdominal surgery.    Blood pressure is elevated today in urgent care.  She does not have a history of hypertension.  She denies any chest pain, palpitations, or shortness of breath.    She denies any other questions or concerns at this time.    Current problem list, medication, and past medical/surgical history were reviewed in Epic.    ROS  See HPI     Objective:      BP (!) 150/82 (BP Location: Left arm, Patient Position: Sitting, BP Cuff Size: Adult long)   Pulse 98   Temp 36.9 °C (98.4 °F) (Temporal)   Resp 18   Ht 1.626 m (5' 4\") Comment: PT reported  Wt 78.4 kg (172 lb 13.5 oz)   LMP 01/01/2006 (Within Years)   SpO2 97%   BMI 29.67 kg/m²     Physical Exam  Constitutional:       General: She is not in acute distress.     Appearance: She is not diaphoretic.   Cardiovascular:      Rate and Rhythm: Normal rate and regular rhythm.      Heart sounds: Normal heart sounds.   Pulmonary:      Effort: Pulmonary effort is normal. No respiratory distress.      Breath sounds: Normal breath sounds.   Abdominal:      General: Bowel sounds are normal.      Palpations: Abdomen is soft.      Tenderness: There is no abdominal tenderness. There is no right CVA tenderness or left CVA tenderness.   Neurological:      Mental Status: She is alert.   Psychiatric:         Mood and Affect: Affect normal.         Judgment: Judgment normal.       Assessment/Plan:     1. Left " lower quadrant abdominal pain  No sign of infection on urinalysis.  Patient is currently asymptomatic.  She is encouraged to stay well-hydrated.  - POCT Urinalysis    2. Elevated blood pressure reading  Systemic symptoms seen through elevated blood pressure.  She was encouraged to follow with her PCP.      Instructed to return to Urgent Care or nearest Emergency Department if symptoms fail to improve, for any change in condition, further concerns, or new concerning symptoms. Patient states understanding of the plan of care and discharge instructions.    Deysi Tariq M.D.

## 2025-04-03 ENCOUNTER — OFFICE VISIT (OUTPATIENT)
Dept: MEDICAL GROUP | Facility: PHYSICIAN GROUP | Age: 75
End: 2025-04-03
Payer: MEDICARE

## 2025-04-03 VITALS
OXYGEN SATURATION: 97 % | DIASTOLIC BLOOD PRESSURE: 72 MMHG | HEART RATE: 98 BPM | SYSTOLIC BLOOD PRESSURE: 122 MMHG | WEIGHT: 168.5 LBS | BODY MASS INDEX: 31.01 KG/M2 | HEIGHT: 62 IN | TEMPERATURE: 98.6 F

## 2025-04-03 DIAGNOSIS — Z00.00 ROUTINE HEALTH MAINTENANCE: ICD-10-CM

## 2025-04-03 DIAGNOSIS — E66.811 CLASS 1 OBESITY DUE TO EXCESS CALORIES WITH SERIOUS COMORBIDITY AND BODY MASS INDEX (BMI) OF 30.0 TO 30.9 IN ADULT: ICD-10-CM

## 2025-04-03 DIAGNOSIS — I89.0 LYMPHEDEMA OF BOTH LOWER EXTREMITIES: ICD-10-CM

## 2025-04-03 DIAGNOSIS — K21.9 GASTROESOPHAGEAL REFLUX DISEASE WITHOUT ESOPHAGITIS: ICD-10-CM

## 2025-04-03 DIAGNOSIS — E78.2 MIXED HYPERLIPIDEMIA: ICD-10-CM

## 2025-04-03 DIAGNOSIS — Z00.00 ENCOUNTER FOR WELL ADULT EXAM WITHOUT ABNORMAL FINDINGS: ICD-10-CM

## 2025-04-03 DIAGNOSIS — E66.09 CLASS 1 OBESITY DUE TO EXCESS CALORIES WITH SERIOUS COMORBIDITY AND BODY MASS INDEX (BMI) OF 30.0 TO 30.9 IN ADULT: ICD-10-CM

## 2025-04-03 DIAGNOSIS — Z12.31 ENCOUNTER FOR SCREENING MAMMOGRAM FOR BREAST CANCER: ICD-10-CM

## 2025-04-03 DIAGNOSIS — E55.9 VITAMIN D INSUFFICIENCY: ICD-10-CM

## 2025-04-03 PROCEDURE — 99397 PER PM REEVAL EST PAT 65+ YR: CPT | Performed by: NURSE PRACTITIONER

## 2025-04-03 PROCEDURE — 3074F SYST BP LT 130 MM HG: CPT | Performed by: NURSE PRACTITIONER

## 2025-04-03 PROCEDURE — 3078F DIAST BP <80 MM HG: CPT | Performed by: NURSE PRACTITIONER

## 2025-04-03 ASSESSMENT — PATIENT HEALTH QUESTIONNAIRE - PHQ9: CLINICAL INTERPRETATION OF PHQ2 SCORE: 0

## 2025-04-03 ASSESSMENT — FIBROSIS 4 INDEX: FIB4 SCORE: 0.89

## 2025-04-03 NOTE — PROGRESS NOTES
"Subjective:   CC:   Chief Complaint   Patient presents with    Annual Exam    Requesting Labs     Verbal consent was acquired by the patient to use "[employer] ambient listening note generation during this visit Yes    HPI:   Shannan Dove is a 74 y.o. female who presents for annual exam:    History of Present Illness  The patient presents for an annual exam.    She reports a persistent sensation of phlegm in her throat, which has been ongoing for approximately 2 to 3 months. Initially, she felt the need to clear her throat frequently, but currently, she does not experience any pain during swallowing. She does not report any postnasal drainage or allergies but mentions a recent onset of nasal drip. She was previously prescribed Flonase for this symptom. She does not report any dysphagia, chest pain, or shortness of breath. She also does not report any palpitations or tachycardia. She does not consume coffee but admits to occasional chocolate intake. She experiences a sensation of feeling \"off\" when reaching above her head or hanging drapes, but does not feel faint or experience pain when extending her hands. She does not report any hematuria.    She has a history of COVID-19 infection, which was severe enough to require urgent care. She experienced a sore throat and loss of appetite during the infection. She also had a sudden onset of severe abdominal pain, diarrhea, and vomiting, which resolved spontaneously after 4 hours. She has not experienced any recurrence of these symptoms. She has a family history of colon cancer in her mother and brother, who both had polyps removed. She has never undergone hormone replacement therapy post-menopause and does not experience any menopausal symptoms. She does not perform self-breast exams. She is up to date with her dental and eye examinations. She declines STD screening. She is due for a mammogram and bone density test in 11/2025. Her next colonoscopy is scheduled for " 10/2026. She is current with her tetanus vaccine, which is due in . She maintains a healthy diet and does not consume alcohol or use drugs. She feels safe in her relationship and always wears her seatbelt in the car. She admits to inconsistent use of sun protection.    She has been using a compression machine for her legs since  but reports that it does not fit properly and causes excessive pressure. She has previously consulted with a physical therapist, Radha Garcia, for leg massages and wrapping, which resulted in increased urination. She was advised to use the compression machine as an alternative to wrapping. She is seeking a referral back to the physical therapist for further evaluation and management of her leg condition.    SOCIAL HISTORY  She does not smoke, drink alcohol, or use drugs.    FAMILY HISTORY  Her mother had colon cancer. Her brother had colon cancer and now has liver cancer and lung cancer. Another brother had prostate cancer and has been cancer-free for 16 or 17 years. Her aunt had atrial fibrillation and underwent ablation.    MEDICATIONS  atorvastatin, calcium, vitamin D, CoQ10, famotidine, meloxicam, omega-3, Kenalog (as needed)    IMMUNIZATIONS  She is up to date on her COVID-19 vaccines and received her last dose in October.      Anticipatory Guidance:  Cholesterol screenin2024   LDL controlled: 70  Diabetes screenin2024  Diet: Recommend more lean meats, fruits, vegetables, whole grains.   Exercise: Encourage regular exercise.   Substance abuse: No   Safe in relationship: Yes  Seatbelts, bike/motorcycle helmet: Yes  Sun protection: Recommended  Dentist: Up to date   Eye doctor: Up to date     Cancer Screening:  Colorectal cancer screening: 10/24/2021, 5 year recall   Cervical cancer screening: Aged out  Breast cancer screenin2023    Infectious Disease Screening/Immunizations:  STI screening: Declines  Chlamydia/Gonorrhea screening: Declines  Hep C  screening: Complete  HIV screen: Aged out  Practices safe sex: Yes    Immunizations:   Influenza: Complete   Tetanus: 2020   Hep B: Complete   Pneumonia: Complete  RSV: Complete  Shingrix: Complete    Received HPV series: Aged out    Preventative Care Screening:   Osteoporosis Screenin2023  Tobacco Screening: Never smoker   AAA Screening: NA    Patient's last menstrual period was 2006 (within years).  She has not utilized hormone replacement therapy.  Denies any menopausal symptoms.  No significant bloating/fluid retention, pelvic pain, or dyspareunia. No abnormal vaginal discharge.   No breast tenderness, mass, nipple discharge or changes in size or contour.    OB History    Para Term  AB Living   2 2 2 0 0 2   SAB IAB Ectopic Molar Multiple Live Births   0 0 0 0 0 2     She  reports that she is not currently sexually active and has had partner(s) who are male. She reports using the following method of birth control/protection: Post-Menopausal.  She  has a past medical history of Arthritis, Basal cell carcinoma (BCC) of face (2024), COVID-19 (2025), Heart murmur, Hyperglycemia (2018), Hyperlipidemia, Injury, Leucocytosis (2018), Post menopausal problems, Varicose veins of both legs with edema, and Vitamin D deficiency.  She  has a past surgical history that includes tonsillectomy and gyn surgery (10/25/2022).    Family History   Problem Relation Age of Onset    Heart Disease Mother         fast heart beat    Cancer Mother         colon    Arrythmia Mother         racing heart beat    Arthritis Mother         hands    Colon Cancer Mother     Psychiatric Illness Father         suicide    Depression Father         suicide    Autism Brother         asperger's ?    Cancer Brother         colon&liver    Stroke Brother     Alcohol/Drug Brother         alcohol&drugs    Drug abuse Brother         cocaine,marijuana    Alcohol abuse Brother     Colon Cancer Brother      Liver Cancer Brother     Hypertension Brother         says it's mild    Cancer Brother         prostate    Prostate cancer Brother     Hypertension Maternal Aunt     Heart Disease Maternal Aunt         fast heart beat,had ablation    Heart Disease Maternal Aunt         fast heart beat ?    Alcohol/Drug Maternal Uncle     Asthma Maternal Grandmother     Heart Disease Maternal Grandmother         weak heart ?    Alcohol/Drug Maternal Grandfather     Heart Disease Maternal Grandfather         heart attack    Heart Disease Paternal Grandmother         heart problems ?    No Known Problems Paternal Grandfather     Alcohol/Drug Other         alcohol    Alcohol/Drug Other         alcohol    Asthma Other     Alcohol/Drug Other         alcohol    Heart Attack Other     Arrythmia Other         racing heart beat    Heart Disease Other     Arrythmia Other         racing heart beat    Heart Disease Other     Asthma Other     Heart Disease Other      Social History     Tobacco Use    Smoking status: Never     Passive exposure: Never    Smokeless tobacco: Never   Vaping Use    Vaping status: Never Used   Substance Use Topics    Alcohol use: No    Drug use: No     Patient Active Problem List    Diagnosis Date Noted    Basal cell carcinoma (BCC) of skin of right upper eyelid including canthus 01/22/2024    Primary osteoarthritis of both shoulders 12/19/2022    Class 1 obesity due to excess calories with serious comorbidity and body mass index (BMI) of 30.0 to 30.9 in adult 08/17/2022    Urinary frequency 07/26/2022    Bilateral primary osteoarthritis of knee 01/31/2022    Lymphedema of both lower extremities 03/03/2021    Varicose vein of leg 08/10/2018    Gastroesophageal reflux disease without esophagitis 12/22/2017    Thyroid mass 06/23/2017    Vitamin D insufficiency 06/22/2017    OM (onychomycosis) 06/22/2017    AK (actinic keratosis) 06/23/2016    Mixed hyperlipidemia 06/23/2016    Right-sided thoracic back pain 06/23/2016     "Heart murmur     Age-related osteoporosis without current pathological fracture      Current Outpatient Medications   Medication Sig Dispense Refill    atorvastatin (LIPITOR) 20 MG Tab Take 1 Tablet by mouth every evening. 100 Tablet 0    meloxicam (MOBIC) 15 MG tablet TAKE 1 TABLET BY MOUTH EVERY  Tablet 3    Coenzyme Q10 (CO Q 10 PO) Take 300 mg by mouth every day.      diclofenac sodium (VOLTAREN) 1 % Gel Apply 4 g topically 4 times a day as needed (knee pain). 350 g 0    triamcinolone acetonide (KENALOG) 0.1 % Cream Apply 1 Application to affected area(s) 2 times a day as needed. 1 Tube 11    Omega-3 Fatty Acids (OMEGA 3 500 PO) Take 1,000 mg by mouth.      famotidine (PEPCID) 20 MG Tab Take 1 Tab by mouth 2 times a day. 60 Tab 0    Calcium Carbonate 600 MG Tab Take 1,200 mg by mouth every day.      Cholecalciferol (VITAMIN D3) 5000 units Tab Take 5,000 Units by mouth every day.       No current facility-administered medications for this visit.     Allergies   Allergen Reactions    Pcn [Penicillins] Hives     Review of Systems   Constitutional: Negative for fever, chills and malaise/fatigue.   HENT: Negative for congestion.    Eyes: Negative for pain.   Respiratory: Negative for cough and shortness of breath.    Cardiovascular: Negative for chest pain and leg swelling.   Gastrointestinal: Negative for nausea, vomiting, abdominal pain and diarrhea.   Genitourinary: Negative for dysuria and hematuria.   Skin: Negative for rash.   Neurological: Negative for dizziness, focal weakness and headaches.   Endo/Heme/Allergies: Does not bruise/bleed easily.   Psychiatric/Behavioral: Negative for depression.  The patient is not nervous/anxious.      Objective:   /72 (BP Location: Left arm, Patient Position: Sitting, BP Cuff Size: Adult)   Pulse 98   Temp 37 °C (98.6 °F) (Temporal)   Ht 1.575 m (5' 2\")   Wt 76.4 kg (168 lb 8 oz)   LMP 01/01/2006 (Within Years)   SpO2 97%   BMI 30.82 kg/m²     Wt Readings " from Last 4 Encounters:   04/03/25 76.4 kg (168 lb 8 oz)   03/11/25 78.4 kg (172 lb 13.5 oz)   02/14/25 78.9 kg (173 lb 14.4 oz)   10/03/24 73.5 kg (162 lb)     Physical Exam:  Constitutional: Well-developed and well-nourished. Not diaphoretic. No distress.   Skin: Skin is warm and dry. No rash noted.  Head: Atraumatic without lesions.  Eyes: Conjunctivae and extraocular motions are normal. Pupils are equal, round, and reactive to light. No scleral icterus.   Ears:  External ears unremarkable. Tympanic membranes clear and intact.  Nose: Nares patent. Septum midline. Turbinates without erythema nor edema. No discharge.   Mouth/Throat: Tongue normal. Oropharynx is clear and moist. Posterior pharynx without erythema or exudates.  Neck: Supple, trachea midline. Normal range of motion. No thyromegaly present. No lymphadenopathy--cervical or supraclavicular.  Cardiovascular: + Irregular beats, likely PVCs.  Otherwise regular rate and rhythm, S1 and S2 without murmur, rubs, or gallops.    Respiratory: Effort normal. Clear to auscultation throughout. No adventitious sounds.   Breast:  Breast exam deferred. Discussed monthly self exams and what to look for, including peau d'orange or nipple retraction, discharge, breasts moving freely and equally without restriction, axillary/supraclavicular adenopathy, or palpable masses/nodules.  Abdomen: Soft, non tender, and without distention. Active bowel sounds in all four quadrants. No rebound, guarding.  Extremities: No cyanosis, clubbing, erythema, nor edema. Radial pulses intact and symmetric.   Musculoskeletal: All major joints AROM full in all directions without pain.  Neurological: Alert and oriented x 3. Grossly non-focal. Strength and sensation grossly intact.   Psychiatric:  Behavior, mood, and affect are appropriate.    Assessment and Plan:   1. Encounter for well adult exam without abnormal findings  New to examiner and patient, noted on physical exam.  She denies  experiencing symptoms of PVCs, advised patient that PVCs are common and usually benign. Potential triggers include caffeine, chocolate, stress, anxiety, and sleep deprivation. She has been advised to monitor her pulse regularly and consider using a smartwatch or Fitbit for heart rate monitoring. If she experiences frequent PVCs or symptoms such as chest discomfort or shortness of breath, a referral to cardiology will be considered.    2. Mixed hyperlipidemia  Chronic, ongoing.  Continue atorvastatin 20 mg nightly.  Due for updated annual labs prior to annual follow-up in October 2025.  - Comp Metabolic Panel; Future  - Lipid Profile; Future  - TSH WITH REFLEX TO FT4; Future    3. Gastroesophageal reflux disease without esophagitis  Chronic, ongoing.  Continue famotidine 20 mg twice daily. Due for updated annual labs prior to annual follow-up in October 2025.  - CBC WITH DIFFERENTIAL; Future  - Comp Metabolic Panel; Future  - TSH WITH REFLEX TO FT4; Future    4. Class 1 obesity due to excess calories with serious comorbidity and body mass index (BMI) of 30.0 to 30.9 in adult  Chronic, ongoing.  Encourage diet high in fruits, vegetables, and fiber. And a diet low in salt, refined carbohydrates, cholesterol, saturated fat, and trans fatty acids.    Encourage a minimum of 30 minutes of moderate intensity aerobic exercise (eg, brisk walking) is recommended on five days each week. Or 30 minutes of vigorous-intensity aerobic exercise (eg, jogging) on three days each week.   Patient's body mass index is 30.82 kg/m². Exercise and nutrition counseling were performed at this visit.  Due for updated annual labs prior to annual follow-up in October 2025.  - Patient identified as having weight management issue.  Appropriate orders and counseling given.    5. Lymphedema of both lower extremities  Chronic, ongoing.  A referral to the lymphedema clinic has been made for further evaluation and management.  - REFERRAL TO  LYMPhEDEMA-PHYSICAL THERAPY    6. Vitamin D insufficiency  Chronic, ongoing.  Continue over-the-counter vitamin D and calcium daily. Due for updated annual labs prior to annual follow-up in October 2025.  - VITAMIN D,25 HYDROXY (DEFICIENCY); Future    7. Encounter for screening mammogram for breast cancer  Due for screening.  - MA-SCREENING MAMMO BILAT W/TOMOSYNTHESIS W/CAD; Future    8. Routine health maintenance  A mammogram has been ordered. A bone density test is scheduled for November. Laboratory tests including CBC, CMP, lipid panel, thyroid function tests, and vitamin D levels will be conducted prior to annual follow-up in October 2025.  - CBC WITH DIFFERENTIAL; Future  - Comp Metabolic Panel; Future  - Lipid Profile; Future  - TSH WITH REFLEX TO FT4; Future  - VITAMIN D,25 HYDROXY (DEFICIENCY); Future      Health maintenance: Up to date   Labs per orders  Immunizations: Up to date  Patient counseled about skin care, diet, supplements, and exercise.  Discussed  breast self exam, mammography screening, menopause, osteoporosis, adequate intake of calcium and vitamin D, diet and exercise, colorectal cancer screening.     Follow-up: Return in about 6 months (around 10/3/2025) for AWV, Follow up Labs.     Please note that this dictation was created using voice recognition software. I have worked with consultants from the vendor as well as technical experts from [employer] to optimize the interface. I have made every reasonable attempt to correct obvious errors, but I expect that there are errors of grammar and possibly content that I did not discover before finalizing the note.

## 2025-04-21 ENCOUNTER — PHYSICAL THERAPY (OUTPATIENT)
Dept: PHYSICAL THERAPY | Facility: REHABILITATION | Age: 75
End: 2025-04-21
Attending: NURSE PRACTITIONER
Payer: MEDICARE

## 2025-04-21 DIAGNOSIS — E78.2 MIXED HYPERLIPIDEMIA: ICD-10-CM

## 2025-04-21 DIAGNOSIS — I89.0 LYMPHEDEMA OF BOTH LOWER EXTREMITIES: ICD-10-CM

## 2025-04-21 PROCEDURE — 97163 PT EVAL HIGH COMPLEX 45 MIN: CPT

## 2025-04-21 RX ORDER — ATORVASTATIN CALCIUM 20 MG/1
20 TABLET, FILM COATED ORAL EVERY EVENING
Qty: 100 TABLET | Refills: 3 | Status: SHIPPED | OUTPATIENT
Start: 2025-04-21

## 2025-04-21 NOTE — OP THERAPY EVALUATION
Outpatient Physical Therapy  LYMPHEDEMA THERAPY INITIAL EVALUATION    [facility] [address]  [address]  [place] [address]  Phone:  [phone]  Fax:  [phone]    Date of Evaluation: 04/21/2025    Patient: Shannan Dove  YOB: 1950  MRN: [id number]     Referring Provider: ELLEN Gomez  [place] [address]   Referring Diagnosis Lymphedema of both lower extremities [I89.0]     Time Calculation      [id number]             Chief Complaint: Lipolymphedema    Visit Diagnoses     ICD-10-CM   1. Lymphedema of both lower extremities  I89.0       Subjective:   History of Present Illness:     Mechanism of injury:  Shannan reports her vasopneumatic pump does not fit her correctly and thinks her pump may have more pressure.       Past Medical History:   Diagnosis Date    Arthritis     Basal cell carcinoma (BCC) of face 01/2024    mohs    COVID-19 02/14/2025    Heart murmur     Hyperglycemia 12/27/2018    Hyperlipidemia     Injury     Leucocytosis 12/27/2018    Post menopausal problems     Varicose veins of both legs with edema     Vitamin D deficiency      Past Surgical History:   Procedure Laterality Date    GYN SURGERY  10/25/2022    Endometrial biopsy    TONSILLECTOMY       Social History     Tobacco Use    Smoking status: Never     Passive exposure: Never    Smokeless tobacco: Never   Substance Use Topics    Alcohol use: No     Family and Occupational History     Socioeconomic History    Marital status:      Spouse name: Not on file    Number of children: Not on file    Years of education: Not on file    Highest education level: Bachelor's degree (e.g., BA, AB, BS)   Occupational History    Not on file       Lymphedema Objective      Other Notes  [id number]   SIZE MS  20-30mmHg        Therapeutic Treatments and Modalities:     Therapeutic Treatment and Modalities Summary: -discussed pump: Shannan reporting she thinks something is  not flowing correctly and fit is off.   Reached out to [employer] rep to speak with Shannan for correction    -discussed compression thigh highs. Shannan wears her compression consistently and is ready for a new pair as this one is more than a year old. Discussed the Lymphedema Treatment Act and will assist her with ordering more. Shannan understands that the pump is a tool to assist with fluid movement and compression is key to keep fluid away.     -discussed basics of lipedema as pt was interested in refreshed education including the following: lipedema is a symmetrical, painful fat disorder affecting subcutaneous adipose tissue mostly in the hips, buttocks, and legs. Often, this fat accumulation does not respond to traditional weight loss strategies of diet and exercise. While the cause is not completely understood, current thinking is that hormonal changes impact the quality of blood vessels and lymphatics such that they become leaky. It is therefore common to feel as though the limb is painful and heavy due to the accumulation of fat and fluid. In addition, sometimes limbs can easily bruise. Informational packet emailed to patient.      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 She arrives today with questions regarding her pump, compression, and lipedema. Questions were addressed and Shannan reports comfort in information understanding. Should she have issue, she understands she may return within 30days. If not, will likely DC.   Prognosis: good      Goals:   Short Term Goals:  1. Shannan will have pump adjusted to better fit and move fluid.     Short term goal time span:  2-4 weeks    Long Term Goals:  1. Shannan will obtain three new pairs of compression thigh highs to assist with fluid removal out of her legs.     Plan:  Therapy options:   Physical therapy treatment to continue  Planned therapy interventions:  Compression stocking, decongestive exercises, home exercise program, intermittent compression, manual lymph drainage, strengthening exercises, soft tissue manual techniques (CPT 16042), sequential compression pump, self-care/training (CPT 85582), referral for compression garment & instructions for don/doffing, range of motion exercises, postural exercises, patient education and myofascial release techniques  Planned education:  Functional anatomy and physiology of the lymphatic system, pathophysiology of lymphedema, lymphedema exercise, lymphedema precautions, proper skin care/nutrition, self massage, infection prevention, long term self-management of lymphedema, home pump use, skin care guidelines, activity guidelines, dietary guidelines and scar tissue management  Frequency:  1x month  Duration in weeks:  8  Discussed with:  Patient      Functional Assessment Used    LLIS    Referring provider co-signature:  I have reviewed this plan of care and my co-signature certifies the need for services.    Certification Period: 04/21/2025 to  06/17/25    Physician Signature: ________________________________ Date: ______________

## 2025-04-21 NOTE — TELEPHONE ENCOUNTER
Received request via: Pharmacy    Was the patient seen in the last year in this department? Yes    Does the patient have an active prescription (recently filled or refills available) for medication(s) requested? No    [facility]    Does the patient have care home Plus and need 100-day supply? (This applies to ALL medications) Yes, quantity updated to 100 days

## 2025-04-21 NOTE — TELEPHONE ENCOUNTER
Requested Prescriptions     Pending Prescriptions Disp Refills    atorvastatin (LIPITOR) 20 MG Tab [Pharmacy Med Name: ATORVASTATIN 20MG TABLETS] 100 Tablet 3     Sig: TAKE 1 TABLET BY MOUTH EVERY EVENING       MAYCOL Gomez.

## 2025-05-15 ENCOUNTER — APPOINTMENT (OUTPATIENT)
Dept: URBAN - METROPOLITAN AREA CLINIC 22 | Facility: CLINIC | Age: 75
Setting detail: DERMATOLOGY
End: 2025-05-15

## 2025-05-15 DIAGNOSIS — L82.1 OTHER SEBORRHEIC KERATOSIS: ICD-10-CM

## 2025-05-15 DIAGNOSIS — Z85.828 PERSONAL HISTORY OF OTHER MALIGNANT NEOPLASM OF SKIN: ICD-10-CM

## 2025-05-15 DIAGNOSIS — D22 MELANOCYTIC NEVI: ICD-10-CM

## 2025-05-15 DIAGNOSIS — L57.0 ACTINIC KERATOSIS: ICD-10-CM

## 2025-05-15 DIAGNOSIS — L81.4 OTHER MELANIN HYPERPIGMENTATION: ICD-10-CM

## 2025-05-15 PROBLEM — D22.5 MELANOCYTIC NEVI OF TRUNK: Status: ACTIVE | Noted: 2025-05-15

## 2025-05-15 PROCEDURE — ? LIQUID NITROGEN

## 2025-05-15 PROCEDURE — 17000 DESTRUCT PREMALG LESION: CPT

## 2025-05-15 PROCEDURE — ? COUNSELING

## 2025-05-15 PROCEDURE — 99213 OFFICE O/P EST LOW 20 MIN: CPT | Mod: 25

## 2025-05-15 ASSESSMENT — LOCATION DETAILED DESCRIPTION DERM
LOCATION DETAILED: LEFT SUPERIOR LATERAL UPPER BACK
LOCATION DETAILED: RIGHT VENTRAL PROXIMAL FOREARM
LOCATION DETAILED: INFERIOR THORACIC SPINE
LOCATION DETAILED: EPIGASTRIC SKIN
LOCATION DETAILED: LEFT SUPERIOR CENTRAL MALAR CHEEK
LOCATION DETAILED: SUPERIOR THORACIC SPINE
LOCATION DETAILED: RIGHT LATERAL SUPERIOR EYELID
LOCATION DETAILED: INFERIOR MID FOREHEAD
LOCATION DETAILED: LEFT VENTRAL PROXIMAL FOREARM
LOCATION DETAILED: RIGHT NASAL SIDEWALL
LOCATION DETAILED: LEFT MEDIAL SUPERIOR EYELID

## 2025-05-15 ASSESSMENT — LOCATION SIMPLE DESCRIPTION DERM
LOCATION SIMPLE: LEFT SUPERIOR EYELID
LOCATION SIMPLE: RIGHT FOREARM
LOCATION SIMPLE: RIGHT SUPERIOR EYELID
LOCATION SIMPLE: UPPER BACK
LOCATION SIMPLE: LEFT UPPER BACK
LOCATION SIMPLE: INFERIOR FOREHEAD
LOCATION SIMPLE: RIGHT NOSE
LOCATION SIMPLE: LEFT CHEEK
LOCATION SIMPLE: LEFT FOREARM
LOCATION SIMPLE: ABDOMEN

## 2025-05-15 ASSESSMENT — LOCATION ZONE DERM
LOCATION ZONE: EYELID
LOCATION ZONE: NOSE
LOCATION ZONE: FACE
LOCATION ZONE: TRUNK
LOCATION ZONE: ARM

## 2025-05-20 ENCOUNTER — TELEPHONE (OUTPATIENT)
Dept: PHYSICAL THERAPY | Facility: REHABILITATION | Age: 75
End: 2025-05-20
Payer: MEDICARE

## 2025-05-20 NOTE — OP THERAPY DISCHARGE SUMMARY
Outpatient Physical Therapy  DISCHARGE SUMMARY NOTE      [address]  [address]  [place] [address]  Phone:  [phone]  Fax:  [phone]    Date of Visit: 05/20/2025    Patient: Shannan Dove  YOB: 1950  MRN: [id number]     Referring Provider: ELLEN Gomez  [address] [place] [id number]   Referring Diagnosis Lymphedema of both lower extremities [I89.0]             Your patient is being discharged from Physical Therapy with the following comments:   Goals met    Comments:  Pt reached out to therapist stating her pump issue had been resolved and she had received her new compression garments. At this time, she does not require further intervention and will be DC'd.     Thank you for the referral,    Sierra Garcia, PT, DPT, CLT    Date: 5/20/2025

## 2025-05-27 ENCOUNTER — HOSPITAL ENCOUNTER (OUTPATIENT)
Dept: RADIOLOGY | Facility: MEDICAL CENTER | Age: 75
End: 2025-05-27
Attending: NURSE PRACTITIONER
Payer: MEDICARE

## 2025-05-27 DIAGNOSIS — Z12.31 ENCOUNTER FOR SCREENING MAMMOGRAM FOR BREAST CANCER: ICD-10-CM

## 2025-05-27 PROCEDURE — 77067 SCR MAMMO BI INCL CAD: CPT

## 2025-05-29 ENCOUNTER — RESULTS FOLLOW-UP (OUTPATIENT)
Dept: MEDICAL GROUP | Facility: PHYSICIAN GROUP | Age: 75
End: 2025-05-29

## 2025-06-06 DIAGNOSIS — E78.2 MIXED HYPERLIPIDEMIA: ICD-10-CM

## 2025-06-06 NOTE — TELEPHONE ENCOUNTER
Received request via: Patient    Was the patient seen in the last year in this department? Yes    Does the patient have an active prescription (recently filled or refills available) for medication(s) requested? No    Pharmacy Name: [facility] [place]    Does the patient have FPC Plus and need 100-day supply? (This applies to ALL medications) Yes, quantity updated to 100 days

## 2025-06-08 NOTE — PROGRESS NOTES
Pt DC'd. IV removed, discharge instructions provided to patient, pt verbalizes understanding. Pt states all questions have been answered. Provided with (1) paper prescription and (1) electronic prescription. Copy of discharge paperwork provided to pt, signed copy in chart. Pt states all belongings in possession. Pt ambulated off unit with spouse, refused WC or hospital escort.   Requesting medication for anxiety.

## 2025-06-09 RX ORDER — ATORVASTATIN CALCIUM 20 MG/1
20 TABLET, FILM COATED ORAL EVERY EVENING
Qty: 100 TABLET | Refills: 3 | Status: SHIPPED | OUTPATIENT
Start: 2025-06-09

## 2025-06-09 NOTE — TELEPHONE ENCOUNTER
Requested Prescriptions     Pending Prescriptions Disp Refills    atorvastatin (LIPITOR) 20 MG Tab 100 Tablet 3     Sig: Take 1 Tablet by mouth every evening.       MAYCOL Gomez.

## 2025-06-20 ENCOUNTER — TELEPHONE (OUTPATIENT)
Dept: HEALTH INFORMATION MANAGEMENT | Facility: OTHER | Age: 75
End: 2025-06-20
Payer: MEDICARE

## 2025-07-29 ENCOUNTER — OFFICE VISIT (OUTPATIENT)
Dept: MEDICAL GROUP | Facility: PHYSICIAN GROUP | Age: 75
End: 2025-07-29
Payer: MEDICARE

## 2025-07-29 VITALS
DIASTOLIC BLOOD PRESSURE: 60 MMHG | WEIGHT: 169.8 LBS | HEART RATE: 73 BPM | HEIGHT: 64 IN | OXYGEN SATURATION: 96 % | BODY MASS INDEX: 28.99 KG/M2 | SYSTOLIC BLOOD PRESSURE: 120 MMHG | TEMPERATURE: 98.4 F

## 2025-07-29 DIAGNOSIS — H93.8X1 EAR PRESSURE, RIGHT: Primary | ICD-10-CM

## 2025-07-29 ASSESSMENT — ENCOUNTER SYMPTOMS
DIZZINESS: 1
FEVER: 0
SORE THROAT: 0
LIGHT-HEADEDNESS: 1
CHILLS: 0

## 2025-07-29 ASSESSMENT — FIBROSIS 4 INDEX: FIB4 SCORE: 0.89

## 2025-07-29 NOTE — PROGRESS NOTES
"Verbal consent was acquired by the patient to use [employer] ambient listening note generation during this visit Yes      Subjective   Shannan Dove is a 74 y.o. female who presents for:  History of Present Illness  The patient presents for evaluation of right ear pressure.    She reports a sensation akin to water in her right ear, persisting for the past 2 to 3 weeks. This is accompanied by a feeling of heaviness and a crackling sound when she swallows. She recalls an incident where she felt slightly off balance while turning in her garage. She has a history of severe ear infections during her youth, including one instance where she was diagnosed with strep throat in her ear after swimming in the ocean. This led to a hospital stay in isolation for 3 days. She has a flight scheduled next week and is concerned about potential discomfort during the journey. She typically takes a decongestant the night before flying and uses over-the-counter airplane ears to prevent ear pain.    Review of Systems   Constitutional:  Negative for chills and fever.   HENT:  Positive for ear pain. Negative for sore throat.    Neurological:  Positive for dizziness and light-headedness.   All other systems reviewed and are negative.    Objective   /60 (BP Location: Left arm, Patient Position: Sitting, BP Cuff Size: Adult)   Pulse 73   Temp 36.9 °C (98.4 °F) (Temporal)   Ht 1.626 m (5' 4\")   Wt 77 kg (169 lb 12.8 oz)   SpO2 96%   Physical Exam  General: Well nourished, well developed female in NAD, awake and conversant.  Eyes: Normal conjunctiva, anicteric.  Round symmetrical pupils.  ENT: Hearing grossly intact.  No nasal discharge. Right ear appears normal with no evidence of fluid or infection.  Neck: Neck is supple.  No masses or thyromegaly.  CV: No lower extremity edema.  Respiratory: Respirations are nonlabored.  No wheezing.  Abdomen: Non-Distended.  Skin: Warm.  No rashes or ulcers.  MSK: Normal ambulation.  No " clubbing or cyanosis.  Neuro: Sensation and CN II-XII grossly normal.  Psych: Alert and oriented.  Cooperative, appropriate mood and affect, normal judgment.      Assessment & Plan  1. Ear pressure, right (Primary)  New to examiner.  Reports sensation of water in the right ear, occasional heaviness, and crackling sound when swallowing, persisting for 2 to 3 weeks. No pain or evidence of infection; physical examination reveals no bubbles or fluid in the ear. Recommended use of saline sprays or a neti pot to cleanse the sinuses; Flonase suggested with one squirt in each nostril to alleviate swelling or pressure. Advised to take a decongestant the night before the flight and upon returning home, but not for more than 3 days consecutively.    Follow-up: 10/06/2025.    Return if symptoms worsen or fail to improve.     Please note that this dictation was created using voice recognition software. I have made every reasonable attempt to correct obvious errors, but I expect that there are errors of grammar and possibly content that I did not discover before finalizing the note.